# Patient Record
Sex: FEMALE | Race: WHITE | NOT HISPANIC OR LATINO | Employment: OTHER | URBAN - METROPOLITAN AREA
[De-identification: names, ages, dates, MRNs, and addresses within clinical notes are randomized per-mention and may not be internally consistent; named-entity substitution may affect disease eponyms.]

---

## 2023-07-28 LAB
ALANINE AMINOTRANSFERASE (SGPT) (U/L) IN SER/PLAS: 21 U/L (ref 7–45)
ALBUMIN (G/DL) IN SER/PLAS: 3.8 G/DL (ref 3.4–5)
ALKALINE PHOSPHATASE (U/L) IN SER/PLAS: 37 U/L (ref 33–136)
ANION GAP IN SER/PLAS: 9 MMOL/L (ref 10–20)
ASPARTATE AMINOTRANSFERASE (SGOT) (U/L) IN SER/PLAS: 24 U/L (ref 9–39)
BILIRUBIN TOTAL (MG/DL) IN SER/PLAS: 0.7 MG/DL (ref 0–1.2)
CALCIUM (MG/DL) IN SER/PLAS: 8.8 MG/DL (ref 8.6–10.3)
CARBON DIOXIDE, TOTAL (MMOL/L) IN SER/PLAS: 29 MMOL/L (ref 21–32)
CHLORIDE (MMOL/L) IN SER/PLAS: 104 MMOL/L (ref 98–107)
CREATININE (MG/DL) IN SER/PLAS: 0.63 MG/DL (ref 0.5–1.05)
GFR FEMALE: >90 ML/MIN/1.73M2
GLUCOSE (MG/DL) IN SER/PLAS: 85 MG/DL (ref 74–99)
POTASSIUM (MMOL/L) IN SER/PLAS: 4 MMOL/L (ref 3.5–5.3)
PROTEIN TOTAL: 6.5 G/DL (ref 6.4–8.2)
SODIUM (MMOL/L) IN SER/PLAS: 138 MMOL/L (ref 136–145)
UREA NITROGEN (MG/DL) IN SER/PLAS: 27 MG/DL (ref 6–23)

## 2023-11-05 PROBLEM — R10.2 FEMALE PELVIC PAIN: Status: ACTIVE | Noted: 2023-11-05

## 2023-11-05 PROBLEM — M70.62 GREATER TROCHANTERIC BURSITIS OF BOTH HIPS: Status: ACTIVE | Noted: 2023-11-05

## 2023-11-05 PROBLEM — M81.0 OSTEOPOROSIS: Status: ACTIVE | Noted: 2023-11-05

## 2023-11-05 PROBLEM — N81.4 CYSTOCELE WITH UTERINE DESCENSUS: Status: ACTIVE | Noted: 2023-11-05

## 2023-11-05 PROBLEM — N95.2 ATROPHIC VAGINITIS: Status: ACTIVE | Noted: 2023-11-05

## 2023-11-05 PROBLEM — M70.70: Status: ACTIVE | Noted: 2023-11-05

## 2023-11-05 PROBLEM — M25.551 PAIN OF BOTH HIP JOINTS: Status: ACTIVE | Noted: 2023-11-05

## 2023-11-05 PROBLEM — M70.61 GREATER TROCHANTERIC BURSITIS OF BOTH HIPS: Status: ACTIVE | Noted: 2023-11-05

## 2023-11-05 PROBLEM — M17.11 ARTHRITIS OF KNEE, RIGHT: Status: ACTIVE | Noted: 2023-11-05

## 2023-11-05 PROBLEM — B34.9 NONSPECIFIC SYNDROME SUGGESTIVE OF VIRAL ILLNESS: Status: ACTIVE | Noted: 2023-11-05

## 2023-11-05 PROBLEM — M25.511 RIGHT SHOULDER PAIN: Status: ACTIVE | Noted: 2023-11-05

## 2023-11-05 PROBLEM — N81.10 FEMALE BLADDER PROLAPSE: Status: ACTIVE | Noted: 2023-11-05

## 2023-11-05 PROBLEM — N89.8 VAGINAL DISCHARGE: Status: ACTIVE | Noted: 2023-11-05

## 2023-11-05 PROBLEM — M25.552 PAIN OF BOTH HIP JOINTS: Status: ACTIVE | Noted: 2023-11-05

## 2023-11-05 PROBLEM — M54.50 LOW BACK PAIN: Status: ACTIVE | Noted: 2023-11-05

## 2023-11-05 PROBLEM — N39.41 URGE INCONTINENCE: Status: ACTIVE | Noted: 2023-11-05

## 2023-11-05 PROBLEM — M25.561 RIGHT KNEE PAIN: Status: ACTIVE | Noted: 2023-11-05

## 2023-11-05 PROBLEM — R43.0 LOSS OF SENSE OF SMELL: Status: ACTIVE | Noted: 2019-09-10

## 2023-11-05 PROBLEM — J32.0 CHRONIC MAXILLARY SINUSITIS: Status: ACTIVE | Noted: 2019-09-10

## 2023-11-05 PROBLEM — N81.89 PELVIC FLOOR WEAKNESS: Status: ACTIVE | Noted: 2023-11-05

## 2023-11-05 PROBLEM — N39.3 FEMALE STRESS INCONTINENCE: Status: ACTIVE | Noted: 2023-11-05

## 2023-11-05 RX ORDER — MELOXICAM 15 MG/1
TABLET ORAL
COMMUNITY
End: 2024-03-11

## 2023-11-05 RX ORDER — ALBUTEROL SULFATE 90 UG/1
AEROSOL, METERED RESPIRATORY (INHALATION)
COMMUNITY
End: 2024-02-07

## 2023-11-05 RX ORDER — BIOTIN 10 MG
TABLET ORAL
COMMUNITY

## 2023-11-05 RX ORDER — CHOLECALCIFEROL (VITAMIN D3) 50 MCG
TABLET ORAL
COMMUNITY

## 2023-11-05 RX ORDER — MULTIVITAMIN
TABLET ORAL
COMMUNITY

## 2023-11-05 RX ORDER — PHENYLEPHRINE HCL 10 MG
TABLET ORAL
COMMUNITY

## 2023-11-05 RX ORDER — FLUTICASONE PROPIONATE 50 MCG
SPRAY, SUSPENSION (ML) NASAL
COMMUNITY
End: 2024-02-07

## 2023-11-05 RX ORDER — EPINEPHRINE 0.22MG
100 AEROSOL WITH ADAPTER (ML) INHALATION
COMMUNITY

## 2023-11-05 RX ORDER — ESTRADIOL 0.1 MG/G
CREAM VAGINAL 3 TIMES WEEKLY
COMMUNITY
Start: 2023-04-12

## 2023-11-05 RX ORDER — ACETAMINOPHEN, DEXTROMETHORPHAN HBR, DOXYLAMINE SUCCINATE, PHENYLEPHRINE HCL 650; 20; 12.5; 1 MG/30ML; MG/30ML; MG/30ML; MG/30ML
SOLUTION ORAL
COMMUNITY

## 2023-11-05 RX ORDER — AZITHROMYCIN 200 MG/5ML
POWDER, FOR SUSPENSION ORAL
COMMUNITY
End: 2024-02-07

## 2023-11-05 RX ORDER — CALCIUM CARBONATE 260MG(650)
TABLET,CHEWABLE ORAL
COMMUNITY
End: 2024-04-23 | Stop reason: WASHOUT

## 2023-11-05 RX ORDER — CYCLOSPORINE 0.5 MG/ML
EMULSION OPHTHALMIC
COMMUNITY
Start: 2016-09-12 | End: 2024-02-07

## 2023-11-05 RX ORDER — AZELASTINE 1 MG/ML
2 SPRAY, METERED NASAL NIGHTLY
COMMUNITY
End: 2024-02-07

## 2023-11-07 ENCOUNTER — OFFICE VISIT (OUTPATIENT)
Dept: UROLOGY | Facility: CLINIC | Age: 78
End: 2023-11-07
Payer: MEDICARE

## 2023-11-07 VITALS
BODY MASS INDEX: 21.05 KG/M2 | DIASTOLIC BLOOD PRESSURE: 63 MMHG | HEART RATE: 62 BPM | SYSTOLIC BLOOD PRESSURE: 131 MMHG | WEIGHT: 100.7 LBS

## 2023-11-07 DIAGNOSIS — N81.4 CYSTOCELE WITH UTERINE DESCENSUS: Primary | ICD-10-CM

## 2023-11-07 DIAGNOSIS — N39.41 URGE INCONTINENCE: ICD-10-CM

## 2023-11-07 DIAGNOSIS — N39.3 FEMALE STRESS INCONTINENCE: ICD-10-CM

## 2023-11-07 DIAGNOSIS — N81.89 PELVIC FLOOR WEAKNESS: ICD-10-CM

## 2023-11-07 PROCEDURE — 99213 OFFICE O/P EST LOW 20 MIN: CPT | Mod: PN | Performed by: OBSTETRICS & GYNECOLOGY

## 2023-11-07 PROCEDURE — 1126F AMNT PAIN NOTED NONE PRSNT: CPT | Performed by: OBSTETRICS & GYNECOLOGY

## 2023-11-07 PROCEDURE — 99213 OFFICE O/P EST LOW 20 MIN: CPT | Performed by: OBSTETRICS & GYNECOLOGY

## 2023-11-07 ASSESSMENT — ENCOUNTER SYMPTOMS
DEPRESSION: 0
OCCASIONAL FEELINGS OF UNSTEADINESS: 0
LOSS OF SENSATION IN FEET: 0

## 2023-11-07 NOTE — PROGRESS NOTES
Subjective   Patient ID: Manjula Ríos is a 78 y.o. female who presents to discuss her pelvic complaints.  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    The patient has noted episodic urinary urgency with her first void of the day.  She notes daytime frequency roughly every 3 hours.  She denies any stress urinary incontinence.  She denies any significant vaginal bulge complaints.  She is utilizing her vaginal estrogen therapy.  She denies any bowel related complaints.    She has no other complaints.    From Previous note  77-year-old with symptomatic stage II anterior and apical pelvic organ prolapse, mild vaginal atrophy, and pelvic floor weakness having been fitted with a #4 ring with support and knob 5/16/2023 with persistent vaginal versus urinary leakage concerns having had her pessary removed 8/1/2023 presenting for follow up.     The patient appears to be having excellent results without the pessary removed. She denies any abnormal vaginal discharge or bleeding. She only notes a vaginal bulge when doing jumping jacks or with a heavy cough. However this is easily reduced and this is not bothersome to her at this time.     She denies any worsening urinary urgency or frequency. She does have to rush to bathroom for her first void in the morning but that is not bothersome. She denies any episodes of nocturia or enuresis. She denies any UTI like symptoms.      She is continuing her vaginal estrogen therapy. She denies any vaginal discharge concerns at this time.     She denies any bowel related complaints, no fecal or flatal incontinence.     She has no other complaints.     From previous note   77-year-old with symptomatic stage II anterior and apical pelvic  "organ prolapse, mild vaginal atrophy, and pelvic floor weakness having been fitted with a #4 ring with support and knob 5/16/2023 with persistent vaginal versus urinary leakage concerns.     The patient reports increasing urinary urgency and frequency since the last visit. She states that she attempted to remove the pessary and noticed that she had increased leaking since then.      She denies any vaginal spotting. She states that she has been compliant with the topical estrogen and Trimo-Cornejo.      She denies constipation or any bowel-related symptoms.     She has no other complaints.      From previous note This visit was performed through telemedicine  77-year-old with symptomatic stage II anterior and apical pelvic organ prolapse, mild vaginal atrophy, and pelvic floor weakness having been fitted with a #4 ring with support and knob 5/16/2023 with persistent vaginal versus urinary leakage concerns.     Reports resolution of her malodorous urine s/p Trimo-Cornejo. Complains of persistent \"leakage\" that is vaginal vs urinary in nature, but she is not definitely sure, described as being more of a sensation. Last UDS was in June 2023. Reviewed UDS results again today, including being negative for leakage. Discussed option of removing her pessary at home vs in-office. She will schedule an appointment to have this removed.      She has no other complaints     From previous note  77-year-old with symptomatic stage II anterior and apical pelvic organ prolapse, mild vaginal atrophy, and pelvic floor weakness having been fitted with a #4 ring with support and knob 5/16/2023 with persistent urinary incontinence complaints.     The patient continues to note \"leakage\". She is wearing a pad and notes a \"yellowish\" leakage. She has proceeded with a phenazopyridine challenge and felt that this was bright orange in color and urine in nature. We discussed at length today her lower urinary tract symptoms. She denies any urgency or " frequency. She notes urinating roughly every 4-5 hours during the daytime and notes 1 episode of nocturia. She denies any stress urinary incontinence. She is overall very satisfied with her pessary noting complete resolution of her vaginal bulge complaints. She is utilizing her vaginal estrogen therapy.     We discussed at length today my concerns that her discharge is vaginal in nature as opposed to urine.     She has no other complaints.     From previous note  This visit was performed through telemedicine  77-year-old with symptomatic stage II anterior and apical pelvic organ prolapse, mild vaginal atrophy, and pelvic floor weakness having been fitted with a #4 ring with support and knob 5/16/2023.     The patient was utilizing the Oxytrol patch but noted severe fatigue and pain in her lower extremities. She states her symptoms resolved since she stopped utilizing it. It was noted that she has yellow urine on her pad with her phenazopyridine challenge. She denies any urgency but continues to leak on movement and laughing, coughing and sneezing. Bladder sling Surgery was discussed at length.      She denies any vaginal complaints, no abnormal vaginal bleeding or discharge.     She denies any bowel related complaints, no fecal or flatal incontinence.     She has no other complaints.     From previous note  This visit was performed through telemedicine  77-year-old with symptomatic stage II anterior and apical pelvic organ prolapse, mild vaginal atrophy, and pelvic floor weakness having been fitted with a #4 ring with support and knob 5/16/2023.     The patient presents to discuss her UDS test results, which demonstrates no filling defect, no bladder spasm, mild sensitivity, no obstruction and adequate emptying. The test does not demonstrate any signs of stress incontinence. she voids 4-5 times during the day but leaks in between and utilizes pads throughout the day, as she leaks with movement and at the sound of  water. She denies any episodes of nocturia , however her pad is moist when she wakes up. She denies any UTI like symptoms.      She denies any vaginal complaints, no abnormal vaginal bleeding or discharge.     She denies any bowel related complaints, no fecal or flatal incontinence.     She has no other complaints.     From previous note  77-year-old with symptomatic stage II anterior and apical pelvic organ prolapse, mild vaginal atrophy, and pelvic floor weakness having been fitted with a #4 ring with support and knob 5/16/2023.     The patient reports of noting moderate incontinence after the pessary placed followed by a gush on sneezing. She voids 3-4 times during the day but leaks in between and utilizes pads throughout the day, as she leaks with movement. She denies any episodes of nocturia , however her pad is moist when she wakes up. She denies any UTI like symptoms.      She does note near resolution of her bulge complaints with the pessary in place, however she is bothered by her lower urinary tract complaints. She denies any abnormal vaginal bleeding or discharge. She is utilizing the vaginal estrogen cream.     She denies any bowel related complaints, no fecal or flatal incontinence.     She has no other complaints.     From previous note  77-year-old with symptomatic stage II anterior and apical pelvic organ prolapse, mild vaginal atrophy, and pelvic floor weakness having been fitted with a #4 ring with support 4/25/2023.     The patient is satisfied from the pessary standpoint. She denies any vaginal complaints, no abnormal vaginal bleeding or discharge. She is utilizing the vaginal estrogen cream.     She complaints on leaking when she stands from sitting position. She does note leaking on laughing, cough or sneezing. She is noting 2-3 episodes of nocturia with urgency.     She denies any bowel related complaints, she did feel the pessary was pushing on her bowels but no fecal or flatal  incontinence.     She has no other complaints.        From previous note  77-year-old with symptomatic stage II anterior and apical pelvic organ prolapse, mild vaginal atrophy, and pelvic floor weakness presenting for pessary fitting today 04/25/23.     She is utilizing the vaginal estrogen cream.      She has no other complaints.           From previous note  77- year-old patient presenting as a referral from Dr. Isbell with complaints of pelvic organ prolapse.     The patient states she noticed a vaginal bulge last night in the shower, she denies any pressure or pulling sensation. She also denies any vaginal bleeding or discharge. She is sexually active and denies any vaginal complaints. She was previously seen by Dr. Isbell and Dr. Sepulveda for he gynecological care.     She also note urinary urgency and incontinence. She denies leaking on laughing, cough or sneezing. She denies any nocturia but denies any enuresis. She voids every hour during the day. She denies any history of nephrolithiasis, gross hematuria or chronic recurrent UTIs.      She complaints of constipation but has a bowel movement every morning, she has utilized MiraLAX in the past. She denies any fecal or flatal incontinence.      She has no other complaints.  Review of Systems    Objective   Physical Exam  Constitutional: No fever, No chills and No fatigue.   Eyes: No vision problems and No dryness of the eyes.   ENT: No dry mouth, No hearing loss and No nosebleeds.   Cardiovascular: No chest pain, No palpitations and No orthopnea.   Respiratory: No shortness of breath, No cough and No wheezing.   Gastrointestinal: No abdominal pain, No constipation, No nausea, No diarrhea, No vomiting and No melena.   Genitourinary: As noted in HPI.   Musculoskeletal: No back pain, No myalgias, No muscle weakness, No joint swelling and No leg edema.   Integumentary: No rashes, No skin lesion and No itching.   Neurological: No headache, No numbness and No  dizziness.   Psychiatric: No sleep disturbances, No anxiety and No depression.   Endocrine: No hot flashes, No loss of hair and No hirsutism.   Hematologic/Lymphatic: No swollen glands, No tendency for easy bleeding and No tendency for easy bruising.   All other systems have been reviewed and are negative for complaint.       PHYSICAL EXAMINATION:  No LMP recorded.  Body mass index is 21.05 kg/m².  /63   Pulse 62   Wt 45.7 kg (100 lb 11.2 oz)   BMI 21.05 kg/m²   General Appearance: well appearing  Neuro: Alert and oriented   HEENT: mucous membranes moist, neck supple  Resp: No respiratory distress, normal work of breathing  MSK: normal range of motion, gait appropriate         Assessment/Plan      78-year-old with symptomatic stage II anterior and apical pelvic organ prolapse, mild vaginal atrophy, and pelvic floor weakness having been fitted with a #4 ring with support and knob 5/16/2023 with vaginal versus urinary leakage concerns having had her pessary removed 8/1/2023.     #1 prior to the patient's pessary placement, she denied any stress urinary incontinence complaints. The patient was successfully fitted with a #4 ring with support 4/25/2023 as a #3 ring with support was too small. However she has noted persistent lower urinary tract leakage concerns consistent with stress urinary incontinence. However with the prolapse reduction, 6/2/2023 urodynamics demonstrated no stress urinary incontinence, mildly elevated EMG use, and appropriate bladder emptying with mild irritability. We again discussed that her prolapse reduction with the pessary in place is likely causing occult stress incontinence complaints given the fact that she has no significant urgency complaints. We discussed that correcting any kind of bulge from a surgical standpoint may have no significant effect on her lower urinary tract symptoms. We similarly discussed that correcting her lower urinary tract symptoms would have no effect on  "bulge symptoms. Closer discussion, it appears that her \"leakage\" concerns may actually be vaginal discharge in nature. GEN path 7/12/2023 was completely negative. Trimo-Cornejo appears to have given the patient some benefit but she continues to note \"leakage\". The patient's pessary was removed 8/1/2023. She has noted resolution of her vaginal complaints. She does note episodic bulge concerns but this is not bothersome to her at this time. We did discuss at length today her surgical options including a vaginal hysterectomy with uterosacral ligament suspension, supracervical hysterectomy with sacrocolpopexy, or possible hysteropexy. She will be most interested in a sacrocolpopexy in the future should she find this necessary.     2. We discussed the patient's vaginal atrophy. She will continue her vaginal estrogen therapy 3 times a week moving forward.      3. We discussed the patient's mild pelvic floor weakness.  We also discussed her episodic urinary urgency and frequency.  We discussed the [facility] care pathway including fluid management strategies, pelvic floor physical therapy, and the possibility of beta 3 agonist therapy.  She is otherwise asymptomatic from this.  She was provided a pelvic floor physical therapy referral as well as a list of providers.     4. The patient will follow-up on an as-needed basis moving forward.     LAUREN Montoya MD     Scribe Attestation  By signing my name below, I, Deejay Peña attest that this documentation has been prepared under the direction and in the presence of Ignacio Montoya MD. All medical record entries made by the Scribe were at my direction or personally dictated by me. I have reviewed the chart and agree that the record accurately reflects my personal performance of the history, physical exam, discussion and plan.    "

## 2023-11-07 NOTE — PATIENT INSTRUCTIONS
You are provided a referral for pelvic floor physical therapy as well as list of providers given your episodic urgency complaints.    We also discussed surgical correction of your prolapse.  We discussed proceeding with a hysterectomy through the vagina with a uterosacral ligament suspension or performing a laparoscopic supracervical hysterectomy with sacrocolpopexy.    Please continue your vaginal estrogen therapy.    Please contact the clinic with any questions or concerns.    [phone].

## 2023-11-09 ENCOUNTER — TREATMENT (OUTPATIENT)
Dept: PHYSICAL THERAPY | Facility: CLINIC | Age: 78
End: 2023-11-09
Payer: MEDICARE

## 2023-11-09 DIAGNOSIS — N81.89 PELVIC FLOOR WEAKNESS: Primary | ICD-10-CM

## 2023-11-09 DIAGNOSIS — N39.3 FEMALE STRESS INCONTINENCE: ICD-10-CM

## 2023-11-09 DIAGNOSIS — N81.4 CYSTOCELE WITH UTERINE DESCENSUS: ICD-10-CM

## 2023-11-09 DIAGNOSIS — N39.41 URGE INCONTINENCE: ICD-10-CM

## 2023-11-09 PROCEDURE — 97535 SELF CARE MNGMENT TRAINING: CPT | Mod: GP

## 2023-11-09 PROCEDURE — 97161 PT EVAL LOW COMPLEX 20 MIN: CPT | Mod: GP,KX

## 2023-11-09 ASSESSMENT — PAIN SCALES - GENERAL: PAINLEVEL_OUTOF10: 0 - NO PAIN

## 2023-11-09 ASSESSMENT — ENCOUNTER SYMPTOMS
DEPRESSION: 0
OCCASIONAL FEELINGS OF UNSTEADINESS: 0
LOSS OF SENSATION IN FEET: 0

## 2023-11-09 ASSESSMENT — PAIN - FUNCTIONAL ASSESSMENT: PAIN_FUNCTIONAL_ASSESSMENT: 0-10

## 2023-11-09 NOTE — PROGRESS NOTES
Physical Therapy    PELVIC FLOOR    Name: Manjula Ríos  MRN: [id number]  : 1945  Today's Date: 23     Time Calculation  Start Time: 1303  Stop Time: 1350  Time Calculation (min): 47 min    Assessment:     Pt presenting to the clinic with main complaint of urinary urgency. She is a very active 78 year old female who also has some complaints of prolapse s/p pessary removal but this is not too problematic for her. Anticipate her primary complaint is from abnormal bladder habits. Pt has above average pelvic stability and strength for age. A vaginal exam to assess pressure system and prolapse was deferred until next visit - pt aware. Will see for a short course of physical therapy.     Plan:     PT Plan: Skilled PT  PT Frequency: One time visit  Certification Period Start Date: 23  Certification Period End Date: 24  Rehab Potential: Excellent  Plan of Care Agreement: Patient    Interventions:   Planned interventions include: biofeedback, cryotherapy, education/instruction, electrical stimulation, gait training, home program, hot pack, kinesiotaping, manual therapy, neuromuscular re-education, self care/home management, therapeutic activities, and therapeutic exercises.       Current Problem:  1. Pelvic floor weakness  Referral to Physical Therapy    Follow Up In Physical Therapy      2. Cystocele with uterine descensus  Referral to Physical Therapy      3. Female stress incontinence  Referral to Physical Therapy      4. Urge incontinence  Referral to Physical Therapy          Subjective   General  Reason for Referral: Urinary urgency  Referred By: Dr. Montoya  Pt with history of POP and had a pessary placement for a month. She had it removed because she was having more discharge. Her symptoms were much better. Her biggest complaint is urinary urgency first thing in the morning. Sometimes will have a little bit of urinary leakage on the way.   She has urinary urgency with returning home.   She  is not endorsing any stress incontinence.   The prolapse is not bothersome for her.   There is no bladder pain.   Daytime urinary frequency: 4  Night time frequency: 0   Panty liner in the morning   Trying to do a little bit of kegels --> improving     Ob Gyn:  4 vaginal deliveries, with 1 D+ C  No episiotomies, 1 forcep and + tearing   No gynecological surgeries   Menopause age 52   Currently, taking estrogen      Precautions  Precautions Comment: none     Pain  Pain Assessment: 0-10  Pain Score: 0 - No pain    Objective     AROM:  Spinal flexion: WFL  Spinal extension: WFL  Spinal rotation: R/L within expected values for age  Spinal SB: WFL    PROM/Joint Mobility:  Hip PROM flexion: WFL but with capsular restriction  Hip PROM external and internal rotation: limited ER > IR bilaterally   R/L significant decrease in HS length    Strength:  Hip extension with knee extended 4/5 R/L  Hip extension with knee flexion 4-/5 R/L  Hip abduction 4/5 R/L    Palpation:  No visceral trigger points or reproduction of symptoms     Observation:   Increased lumbar scoliosis with right rotation   Pelvis with CW rotation, potential L ASIS upslip     SL stance:  Able to perform without stepping strategy or pelvic drop  B squat to 90 without subjective vaginal pressure or pelvic compensation  R/L lunge without subjective vaginal pressure       OUTCOMES MEASURE:  NIH CPSI pain: 0  NIH CPSI urinary: 1  NIH CPSI quality of life: 0       Education:   Discussed urge suppression techniques, avoid environmental triggers, eliminate just in case urination.     Careplan Goals:    Pt will be independent in HEP   Pt will be able to verbalize positions and exercises that will increase prolapse symptoms  Pt will be able to have a 0 on NIH CPSI   Pt will be able to reduce urinary urgency 100% of the time when pulling into the garage       Yajaira Dorsey, PT

## 2023-11-28 ENCOUNTER — TREATMENT (OUTPATIENT)
Dept: PHYSICAL THERAPY | Facility: CLINIC | Age: 78
End: 2023-11-28
Payer: MEDICARE

## 2023-11-28 DIAGNOSIS — N81.89 PELVIC FLOOR WEAKNESS: ICD-10-CM

## 2023-11-28 PROCEDURE — 97535 SELF CARE MNGMENT TRAINING: CPT | Mod: GP

## 2023-11-28 PROCEDURE — 97140 MANUAL THERAPY 1/> REGIONS: CPT | Mod: GP

## 2023-11-28 NOTE — PROGRESS NOTES
Physical Therapy - Discharge     PELVIC FLOOR    Name: Manjula Ríos  MRN: [id number]  : 1945  Today's Date: 23     Time Calculation  Start Time: 1107  Stop Time: 1145  Time Calculation (min): 38 min    Assessment:     Pt with minimal functional impairment except for bulge and interested in surgical management. Midline descent noted. Pt with appropriate pelvic floor strength and mild increase in glute compensation during contraction.     Plan:    All patient's questions were answered and will discharge and follow up if needed.       Interventions:   Vaginal exam with consent to assess laxity of vaginal wall    Current Problem:  1. Pelvic floor weakness  Follow Up In Physical Therapy          Subjective   General   Will be seeing Dr. Sepulveda in January for management of symptoms. Interested in bladder sling. Compliant with estrogen. Feeling prolapse more now.     Precautions   none       Pain   0/10    Objective     EXTERNAL OBSERVATION:  Voluntary Contraction: +  Voluntary Relaxation: +  Involuntary Contraction: +  Involuntary Relaxation +        INTERNAL VAGINAL EXAMINATION:  PFM Strength: 4/5  Coordination: 3 in 10 seconds     OUTCOMES MEASURE:    Careplan Goals:  Pt will be independent in HEP --> MET   Pt will be able to verbalize positions and exercises that will increase prolapse symptoms --> MET  Pt will be able to have a 0 on NIH CPSI --> NOT MET   Pt will be able to reduce urinary urgency 100% of the time when pulling into the garage --> NOT MET, PROGRESSING     Yajaira Dorsey, PT

## 2023-12-15 ENCOUNTER — APPOINTMENT (OUTPATIENT)
Dept: OBSTETRICS AND GYNECOLOGY | Facility: CLINIC | Age: 78
End: 2023-12-15
Payer: MEDICARE

## 2023-12-29 ENCOUNTER — OFFICE VISIT (OUTPATIENT)
Dept: OBSTETRICS AND GYNECOLOGY | Facility: CLINIC | Age: 78
End: 2023-12-29
Payer: MEDICARE

## 2023-12-29 VITALS — WEIGHT: 100 LBS | BODY MASS INDEX: 20.99 KG/M2 | HEIGHT: 58 IN

## 2023-12-29 DIAGNOSIS — N81.10 FEMALE BLADDER PROLAPSE: Primary | ICD-10-CM

## 2023-12-29 PROCEDURE — 1126F AMNT PAIN NOTED NONE PRSNT: CPT | Performed by: OBSTETRICS & GYNECOLOGY

## 2023-12-29 PROCEDURE — 99213 OFFICE O/P EST LOW 20 MIN: CPT | Performed by: OBSTETRICS & GYNECOLOGY

## 2023-12-29 ASSESSMENT — ENCOUNTER SYMPTOMS
CONSTITUTIONAL NEGATIVE: 0
NEUROLOGICAL NEGATIVE: 0
CARDIOVASCULAR NEGATIVE: 0
HEMATOLOGIC/LYMPHATIC NEGATIVE: 0
ALLERGIC/IMMUNOLOGIC NEGATIVE: 0
MUSCULOSKELETAL NEGATIVE: 0
GASTROINTESTINAL NEGATIVE: 0
PSYCHIATRIC NEGATIVE: 0
RESPIRATORY NEGATIVE: 0
EYES NEGATIVE: 0
ENDOCRINE NEGATIVE: 0

## 2023-12-29 ASSESSMENT — PAIN SCALES - GENERAL: PAINLEVEL: 0-NO PAIN

## 2023-12-29 NOTE — PROGRESS NOTES
Subjective   Patient ID: Manjula Ríos is a 78 y.o. female who presents for Vaginal Prolapse (Patient here for a prolapse uterus last pap 10/31/14 wnl last mammogram 12/21/22 benign).  HPI  Female is here for exam regarding vaginal prolapse.  She been seen by urogynecology.  They have tried pessaries.  Still with bothersome pressure and prolapse.  Rare leakage some urgency in the morning.  Very active and exercises almost daily.  Bowel symptoms.  Dysuria currently  Review of Systems   Genitourinary:  Positive for vaginal pain.   All other systems reviewed and are negative.      Objective   Physical Exam  External genitalia [place]'s urethra and [place]'s normal.  Vaginal vault without blood or discharge.  Cervix without lesions.  Good support of the posterior floor.  Significant third-degree cystocele.  Mild amount of uterine descensus.  Uterus is small mobile nontender no adnexal masses.  No pelvic floor pain elicited.      Assessment/Plan   Patient with normal exam other than cystocele pressure.  Very active and exercises regularly.  Discussed options.  She is already tried a pessary.  She has done physical therapy.  Not with significant improvement.  I had some discussion with her regarding sacrospinous suspension versus Sacrocolpopexy    Encouraged to follow-up with urogynecology for further counseling.       Earl Sepulveda MD 12/29/23 5:34 PM

## 2024-01-02 ENCOUNTER — OFFICE VISIT (OUTPATIENT)
Dept: UROLOGY | Facility: CLINIC | Age: 79
End: 2024-01-02
Payer: MEDICARE

## 2024-01-02 VITALS — SYSTOLIC BLOOD PRESSURE: 110 MMHG | DIASTOLIC BLOOD PRESSURE: 64 MMHG | HEART RATE: 63 BPM

## 2024-01-02 DIAGNOSIS — N39.3 FEMALE STRESS INCONTINENCE: ICD-10-CM

## 2024-01-02 DIAGNOSIS — N81.4 CYSTOCELE WITH UTERINE DESCENSUS: Primary | ICD-10-CM

## 2024-01-02 DIAGNOSIS — N39.41 URGE INCONTINENCE: ICD-10-CM

## 2024-01-02 DIAGNOSIS — N81.89 PELVIC FLOOR WEAKNESS: ICD-10-CM

## 2024-01-02 PROCEDURE — 99214 OFFICE O/P EST MOD 30 MIN: CPT | Performed by: OBSTETRICS & GYNECOLOGY

## 2024-01-02 PROCEDURE — 1126F AMNT PAIN NOTED NONE PRSNT: CPT | Performed by: OBSTETRICS & GYNECOLOGY

## 2024-01-02 NOTE — PROGRESS NOTES
Subjective   Patient ID: Manjula Ríos is a 78 y.o. female who presents to discuss her pelvic complaints.  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    The patient presents to discuss options for her pelvic organ prolapse. Surgical correction was discussed at length.    She continues to be bothered by her bulge.     She is continuing her vaginal estrogen therapy. She denies any vaginal discharge concerns at this time.     She denies any bowel related complaints, no fecal or flatal incontinence.     She has no other complaints.    From Previous note  78-year-old with symptomatic stage II anterior and apical pelvic organ prolapse, mild vaginal atrophy, and pelvic floor weakness having been fitted with a #4 ring with support and knob 5/16/2023 with persistent vaginal versus urinary leakage concerns having had her pessary removed 8/1/2023.    The patient has noted episodic urinary urgency with her first void of the day.  She notes daytime frequency roughly every 3 hours.  She denies any stress urinary incontinence.  She denies any significant vaginal bulge complaints.  She is utilizing her vaginal estrogen therapy.  She denies any bowel related complaints.    She has no other complaints.    From Previous note  77-year-old with symptomatic stage II anterior and apical pelvic organ prolapse, mild vaginal atrophy, and pelvic floor weakness having been fitted with a #4 ring with support and knob 5/16/2023 with persistent vaginal versus urinary leakage concerns having had her pessary removed 8/1/2023 presenting for follow up.     The patient appears to be having excellent results without the pessary removed. She denies any abnormal vaginal discharge or bleeding. She only notes a  "vaginal bulge when doing jumping jacks or with a heavy cough. However this is easily reduced and this is not bothersome to her at this time.     She denies any worsening urinary urgency or frequency. She does have to rush to bathroom for her first void in the morning but that is not bothersome. She denies any episodes of nocturia or enuresis. She denies any UTI like symptoms.      She is continuing her vaginal estrogen therapy. She denies any vaginal discharge concerns at this time.     She denies any bowel related complaints, no fecal or flatal incontinence.     She has no other complaints.     From previous note   77-year-old with symptomatic stage II anterior and apical pelvic organ prolapse, mild vaginal atrophy, and pelvic floor weakness having been fitted with a #4 ring with support and knob 5/16/2023 with persistent vaginal versus urinary leakage concerns.     The patient reports increasing urinary urgency and frequency since the last visit. She states that she attempted to remove the pessary and noticed that she had increased leaking since then.      She denies any vaginal spotting. She states that she has been compliant with the topical estrogen and Trimo-Cornejo.      She denies constipation or any bowel-related symptoms.     She has no other complaints.      From previous note This visit was performed through telemedicine  77-year-old with symptomatic stage II anterior and apical pelvic organ prolapse, mild vaginal atrophy, and pelvic floor weakness having been fitted with a #4 ring with support and knob 5/16/2023 with persistent vaginal versus urinary leakage concerns.     Reports resolution of her malodorous urine s/p Trimo-Cornejo. Complains of persistent \"leakage\" that is vaginal vs urinary in nature, but she is not definitely sure, described as being more of a sensation. Last UDS was in June 2023. Reviewed UDS results again today, including being negative for leakage. Discussed option of removing her pessary " "at home vs in-office. She will schedule an appointment to have this removed.      She has no other complaints     From previous note  77-year-old with symptomatic stage II anterior and apical pelvic organ prolapse, mild vaginal atrophy, and pelvic floor weakness having been fitted with a #4 ring with support and knob 5/16/2023 with persistent urinary incontinence complaints.     The patient continues to note \"leakage\". She is wearing a pad and notes a \"yellowish\" leakage. She has proceeded with a phenazopyridine challenge and felt that this was bright orange in color and urine in nature. We discussed at length today her lower urinary tract symptoms. She denies any urgency or frequency. She notes urinating roughly every 4-5 hours during the daytime and notes 1 episode of nocturia. She denies any stress urinary incontinence. She is overall very satisfied with her pessary noting complete resolution of her vaginal bulge complaints. She is utilizing her vaginal estrogen therapy.     We discussed at length today my concerns that her discharge is vaginal in nature as opposed to urine.     She has no other complaints.     From previous note  This visit was performed through telemedicine  77-year-old with symptomatic stage II anterior and apical pelvic organ prolapse, mild vaginal atrophy, and pelvic floor weakness having been fitted with a #4 ring with support and knob 5/16/2023.     The patient was utilizing the Oxytrol patch but noted severe fatigue and pain in her lower extremities. She states her symptoms resolved since she stopped utilizing it. It was noted that she has yellow urine on her pad with her phenazopyridine challenge. She denies any urgency but continues to leak on movement and laughing, coughing and sneezing. Bladder sling Surgery was discussed at length.      She denies any vaginal complaints, no abnormal vaginal bleeding or discharge.     She denies any bowel related complaints, no fecal or flatal " incontinence.     She has no other complaints.     From previous note  This visit was performed through telemedicine  77-year-old with symptomatic stage II anterior and apical pelvic organ prolapse, mild vaginal atrophy, and pelvic floor weakness having been fitted with a #4 ring with support and knob 5/16/2023.     The patient presents to discuss her UDS test results, which demonstrates no filling defect, no bladder spasm, mild sensitivity, no obstruction and adequate emptying. The test does not demonstrate any signs of stress incontinence. she voids 4-5 times during the day but leaks in between and utilizes pads throughout the day, as she leaks with movement and at the sound of water. She denies any episodes of nocturia , however her pad is moist when she wakes up. She denies any UTI like symptoms.      She denies any vaginal complaints, no abnormal vaginal bleeding or discharge.     She denies any bowel related complaints, no fecal or flatal incontinence.     She has no other complaints.     From previous note  77-year-old with symptomatic stage II anterior and apical pelvic organ prolapse, mild vaginal atrophy, and pelvic floor weakness having been fitted with a #4 ring with support and knob 5/16/2023.     The patient reports of noting moderate incontinence after the pessary placed followed by a gush on sneezing. She voids 3-4 times during the day but leaks in between and utilizes pads throughout the day, as she leaks with movement. She denies any episodes of nocturia , however her pad is moist when she wakes up. She denies any UTI like symptoms.      She does note near resolution of her bulge complaints with the pessary in place, however she is bothered by her lower urinary tract complaints. She denies any abnormal vaginal bleeding or discharge. She is utilizing the vaginal estrogen cream.     She denies any bowel related complaints, no fecal or flatal incontinence.     She has no other complaints.     From  previous note  77-year-old with symptomatic stage II anterior and apical pelvic organ prolapse, mild vaginal atrophy, and pelvic floor weakness having been fitted with a #4 ring with support 4/25/2023.     The patient is satisfied from the pessary standpoint. She denies any vaginal complaints, no abnormal vaginal bleeding or discharge. She is utilizing the vaginal estrogen cream.     She complaints on leaking when she stands from sitting position. She does note leaking on laughing, cough or sneezing. She is noting 2-3 episodes of nocturia with urgency.     She denies any bowel related complaints, she did feel the pessary was pushing on her bowels but no fecal or flatal incontinence.     She has no other complaints.        From previous note  77-year-old with symptomatic stage II anterior and apical pelvic organ prolapse, mild vaginal atrophy, and pelvic floor weakness presenting for pessary fitting today 04/25/23.     She is utilizing the vaginal estrogen cream.      She has no other complaints.           From previous note  77- year-old patient presenting as a referral from Dr. Isbell with complaints of pelvic organ prolapse.     The patient states she noticed a vaginal bulge last night in the shower, she denies any pressure or pulling sensation. She also denies any vaginal bleeding or discharge. She is sexually active and denies any vaginal complaints. She was previously seen by Dr. Isbell and Dr. Sepulveda for he gynecological care.     She also note urinary urgency and incontinence. She denies leaking on laughing, cough or sneezing. She denies any nocturia but denies any enuresis. She voids every hour during the day. She denies any history of nephrolithiasis, gross hematuria or chronic recurrent UTIs.      She complaints of constipation but has a bowel movement every morning, she has utilized MiraLAX in the past. She denies any fecal or flatal incontinence.      She has no other complaints.  Review of  Systems    Objective   Physical Exam  Constitutional: No fever, No chills and No fatigue.   Eyes: No vision problems and No dryness of the eyes.   ENT: No dry mouth, No hearing loss and No nosebleeds.   Cardiovascular: No chest pain, No palpitations and No orthopnea.   Respiratory: No shortness of breath, No cough and No wheezing.   Gastrointestinal: No abdominal pain, No constipation, No nausea, No diarrhea, No vomiting and No melena.   Genitourinary: As noted in HPI.   Musculoskeletal: No back pain, No myalgias, No muscle weakness, No joint swelling and No leg edema.   Integumentary: No rashes, No skin lesion and No itching.   Neurological: No headache, No numbness and No dizziness.   Psychiatric: No sleep disturbances, No anxiety and No depression.   Endocrine: No hot flashes, No loss of hair and No hirsutism.   Hematologic/Lymphatic: No swollen glands, No tendency for easy bleeding and No tendency for easy bruising.   All other systems have been reviewed and are negative for complaint.       PHYSICAL EXAMINATION:  No LMP recorded (lmp unknown).  There is no height or weight on file to calculate BMI.  LMP  (LMP Unknown)   General Appearance: well appearing  Neuro: Alert and oriented   HEENT: mucous membranes moist, neck supple  Resp: No respiratory distress, normal work of breathing  MSK: normal range of motion, gait appropriate         Assessment/Plan      78-year-old with symptomatic stage II anterior and apical pelvic organ prolapse, mild vaginal atrophy, and pelvic floor weakness having been fitted with a #4 ring with support and knob 5/16/2023 with vaginal versus urinary leakage concerns having had her pessary removed 8/1/2023.     #1 prior to the patient's pessary placement, she denied any stress urinary incontinence complaints. The patient was successfully fitted with a #4 ring with support 4/25/2023 as a #3 ring with support was too small. However she has noted persistent lower urinary tract leakage  "concerns consistent with stress urinary incontinence. However with the prolapse reduction, 6/2/2023 urodynamics demonstrated no stress urinary incontinence, mildly elevated EMG use, and appropriate bladder emptying with mild irritability. We again discussed that her prolapse reduction with the pessary in place is likely causing occult stress incontinence complaints given the fact that she has no significant urgency complaints. We discussed that correcting any kind of bulge from a surgical standpoint may have no significant effect on her lower urinary tract symptoms. We similarly discussed that correcting her lower urinary tract symptoms would have no effect on bulge symptoms. Closer discussion, it appears that her \"leakage\" concerns may actually be vaginal discharge in nature. GEN path 7/12/2023 was completely negative. Trimo-Cornejo appears to have given the patient some benefit but she continues to note \"leakage\". The patient's pessary was removed 8/1/2023.  She is significantly bothered by her bulge complaints and wishes to proceed with definitive surgical options.  We therefore discussed at length today the options of a sacrocolpopexy, uterosacral ligament suspension as well as hysteropexy.  We discussed the risks of bleeding, infection, damage surrounding tissues, mesh exposure, postoperative restrictions, and failure.  The patient is most interested in proceeding with either a uterosacral ligament suspension or hysteropexy.  She wishes to consider these options before making a decision.      2. We have previously discussed the patient's vaginal atrophy. She will continue her vaginal estrogen therapy 3 times a week moving forward.      3. We discussed the patient's mild pelvic floor weakness.  We also discussed her episodic urinary urgency and frequency.  We discussed the [facility] care pathway including fluid management strategies, pelvic floor physical therapy, and the possibility of beta 3 agonist therapy.  She is " otherwise asymptomatic from this.  She has not had the opportunity to follow-up with pelvic floor physical therapy.     4. The patient will be contacted tomorrow to discuss how she would like to proceed with surgical correction of her prolapse concerns.     LAUREN Montoya MD     Scribe Attestation  By signing my name below, I, Deejay Peña attest that this documentation has been prepared under the direction and in the presence of Ignacio Montoya MD. All medical record entries made by the Scribe were at my direction or personally dictated by me. I have reviewed the chart and agree that the record accurately reflects my personal performance of the history, physical exam, discussion and plan.

## 2024-01-03 ENCOUNTER — PREP FOR PROCEDURE (OUTPATIENT)
Dept: OPERATING ROOM | Facility: HOSPITAL | Age: 79
End: 2024-01-03
Payer: MEDICARE

## 2024-01-03 ENCOUNTER — TELEPHONE (OUTPATIENT)
Dept: UROLOGY | Facility: CLINIC | Age: 79
End: 2024-01-03

## 2024-01-03 DIAGNOSIS — N81.4 UTEROVAGINAL PROLAPSE: Primary | ICD-10-CM

## 2024-01-03 RX ORDER — PHENAZOPYRIDINE HYDROCHLORIDE 100 MG/1
200 TABLET, FILM COATED ORAL ONCE
Status: CANCELLED | OUTPATIENT
Start: 2024-01-03 | End: 2024-01-03

## 2024-01-03 RX ORDER — CEFAZOLIN SODIUM 2 G/100ML
2 INJECTION, SOLUTION INTRAVENOUS ONCE
Status: CANCELLED | OUTPATIENT
Start: 2024-01-03 | End: 2024-01-03

## 2024-01-08 ENCOUNTER — TELEPHONE (OUTPATIENT)
Dept: ENDOCRINOLOGY | Facility: CLINIC | Age: 79
End: 2024-01-08
Payer: MEDICARE

## 2024-01-08 ENCOUNTER — TELEPHONE (OUTPATIENT)
Dept: PRIMARY CARE | Facility: CLINIC | Age: 79
End: 2024-01-08

## 2024-01-08 NOTE — TELEPHONE ENCOUNTER
Pt is scheduled to have her prolia injection on 02/26/24 at [facility] center but is scheduled to have a hysterectomy on 02/15/24. Pt would like to know if she should get prolia earlier or delay a bit until after heals from hysterectomy?

## 2024-01-08 NOTE — TELEPHONE ENCOUNTER
Pt due for her prolia on the 26th but she is having a hysterectomy on 15th wants to know if the prolia s/b delayed or made sooner

## 2024-01-22 ENCOUNTER — APPOINTMENT (OUTPATIENT)
Dept: OBSTETRICS AND GYNECOLOGY | Facility: CLINIC | Age: 79
End: 2024-01-22
Payer: MEDICARE

## 2024-02-02 DIAGNOSIS — N81.89 PELVIC FLOOR WEAKNESS: ICD-10-CM

## 2024-02-02 DIAGNOSIS — N39.3 FEMALE STRESS INCONTINENCE: ICD-10-CM

## 2024-02-02 DIAGNOSIS — N81.4 CYSTOCELE WITH UTERINE DESCENSUS: Primary | ICD-10-CM

## 2024-02-07 ENCOUNTER — TELEMEDICINE CLINICAL SUPPORT (OUTPATIENT)
Dept: PREADMISSION TESTING | Facility: HOSPITAL | Age: 79
End: 2024-02-07
Payer: MEDICARE

## 2024-02-07 DIAGNOSIS — N39.3 FEMALE STRESS INCONTINENCE: ICD-10-CM

## 2024-02-07 DIAGNOSIS — N81.89 PELVIC FLOOR WEAKNESS: ICD-10-CM

## 2024-02-07 DIAGNOSIS — N81.4 CYSTOCELE WITH UTERINE DESCENSUS: ICD-10-CM

## 2024-02-07 RX ORDER — ZINC GLUCONATE 50 MG
50 TABLET ORAL DAILY
COMMUNITY

## 2024-02-07 RX ORDER — ASCORBIC ACID 500 MG
500 TABLET ORAL DAILY
COMMUNITY

## 2024-02-09 ENCOUNTER — PRE-ADMISSION TESTING (OUTPATIENT)
Dept: PREADMISSION TESTING | Facility: HOSPITAL | Age: 79
End: 2024-02-09
Payer: MEDICARE

## 2024-02-09 ENCOUNTER — LAB (OUTPATIENT)
Dept: LAB | Facility: LAB | Age: 79
End: 2024-02-09
Payer: MEDICARE

## 2024-02-09 ENCOUNTER — TELEPHONE (OUTPATIENT)
Dept: UROLOGY | Facility: CLINIC | Age: 79
End: 2024-02-09

## 2024-02-09 VITALS
HEART RATE: 55 BPM | RESPIRATION RATE: 18 BRPM | SYSTOLIC BLOOD PRESSURE: 105 MMHG | TEMPERATURE: 95.9 F | BODY MASS INDEX: 19.11 KG/M2 | HEIGHT: 59 IN | DIASTOLIC BLOOD PRESSURE: 65 MMHG | WEIGHT: 94.8 LBS | OXYGEN SATURATION: 100 %

## 2024-02-09 DIAGNOSIS — Z01.818 PREOP TESTING: ICD-10-CM

## 2024-02-09 DIAGNOSIS — Z01.818 PREOP TESTING: Primary | ICD-10-CM

## 2024-02-09 DIAGNOSIS — M81.0 OSTEOPOROSIS, UNSPECIFIED OSTEOPOROSIS TYPE, UNSPECIFIED PATHOLOGICAL FRACTURE PRESENCE: ICD-10-CM

## 2024-02-09 DIAGNOSIS — M81.0 OSTEOPOROSIS, UNSPECIFIED OSTEOPOROSIS TYPE, UNSPECIFIED PATHOLOGICAL FRACTURE PRESENCE: Primary | ICD-10-CM

## 2024-02-09 LAB
ANION GAP SERPL CALC-SCNC: 11 MMOL/L (ref 10–20)
APPEARANCE UR: CLEAR
BASOPHILS # BLD AUTO: 0.04 X10*3/UL (ref 0–0.1)
BASOPHILS NFR BLD AUTO: 0.6 %
BILIRUB UR STRIP.AUTO-MCNC: NEGATIVE MG/DL
BUN SERPL-MCNC: 22 MG/DL (ref 6–23)
CALCIUM SERPL-MCNC: 10 MG/DL (ref 8.6–10.3)
CALCIUM SERPL-MCNC: 10 MG/DL (ref 8.6–10.3)
CHLORIDE SERPL-SCNC: 100 MMOL/L (ref 98–107)
CO2 SERPL-SCNC: 30 MMOL/L (ref 21–32)
COLOR UR: YELLOW
CREAT SERPL-MCNC: 0.72 MG/DL (ref 0.5–1.05)
EGFRCR SERPLBLD CKD-EPI 2021: 87 ML/MIN/1.73M*2
EOSINOPHIL # BLD AUTO: 0.07 X10*3/UL (ref 0–0.4)
EOSINOPHIL NFR BLD AUTO: 1.1 %
ERYTHROCYTE [DISTWIDTH] IN BLOOD BY AUTOMATED COUNT: 12.4 % (ref 11.5–14.5)
GLUCOSE SERPL-MCNC: 94 MG/DL (ref 74–99)
GLUCOSE UR STRIP.AUTO-MCNC: NEGATIVE MG/DL
HCT VFR BLD AUTO: 40.8 % (ref 36–46)
HGB BLD-MCNC: 13.3 G/DL (ref 12–16)
IMM GRANULOCYTES # BLD AUTO: 0.02 X10*3/UL (ref 0–0.5)
IMM GRANULOCYTES NFR BLD AUTO: 0.3 % (ref 0–0.9)
KETONES UR STRIP.AUTO-MCNC: ABNORMAL MG/DL
LEUKOCYTE ESTERASE UR QL STRIP.AUTO: NEGATIVE
LYMPHOCYTES # BLD AUTO: 1.71 X10*3/UL (ref 0.8–3)
LYMPHOCYTES NFR BLD AUTO: 26.8 %
MCH RBC QN AUTO: 29.5 PG (ref 26–34)
MCHC RBC AUTO-ENTMCNC: 32.6 G/DL (ref 32–36)
MCV RBC AUTO: 91 FL (ref 80–100)
MONOCYTES # BLD AUTO: 0.69 X10*3/UL (ref 0.05–0.8)
MONOCYTES NFR BLD AUTO: 10.8 %
NEUTROPHILS # BLD AUTO: 3.86 X10*3/UL (ref 1.6–5.5)
NEUTROPHILS NFR BLD AUTO: 60.4 %
NITRITE UR QL STRIP.AUTO: NEGATIVE
NRBC BLD-RTO: 0 /100 WBCS (ref 0–0)
PH UR STRIP.AUTO: 6 [PH]
PLATELET # BLD AUTO: 218 X10*3/UL (ref 150–450)
POTASSIUM SERPL-SCNC: 4.7 MMOL/L (ref 3.5–5.3)
PROT UR STRIP.AUTO-MCNC: NEGATIVE MG/DL
RBC # BLD AUTO: 4.51 X10*6/UL (ref 4–5.2)
RBC # UR STRIP.AUTO: NEGATIVE /UL
SODIUM SERPL-SCNC: 136 MMOL/L (ref 136–145)
SP GR UR STRIP.AUTO: 1.02
UROBILINOGEN UR STRIP.AUTO-MCNC: <2 MG/DL
WBC # BLD AUTO: 6.4 X10*3/UL (ref 4.4–11.3)

## 2024-02-09 PROCEDURE — 80048 BASIC METABOLIC PNL TOTAL CA: CPT

## 2024-02-09 PROCEDURE — 99204 OFFICE O/P NEW MOD 45 MIN: CPT | Performed by: PHYSICIAN ASSISTANT

## 2024-02-09 PROCEDURE — 36415 COLL VENOUS BLD VENIPUNCTURE: CPT

## 2024-02-09 PROCEDURE — 85025 COMPLETE CBC W/AUTO DIFF WBC: CPT

## 2024-02-09 PROCEDURE — 81003 URINALYSIS AUTO W/O SCOPE: CPT

## 2024-02-09 PROCEDURE — 82310 ASSAY OF CALCIUM: CPT

## 2024-02-09 ASSESSMENT — ENCOUNTER SYMPTOMS
PSYCHIATRIC NEGATIVE: 1
CARDIOVASCULAR NEGATIVE: 1
NEUROLOGICAL NEGATIVE: 1
GASTROINTESTINAL NEGATIVE: 1
HEMATOLOGIC/LYMPHATIC NEGATIVE: 1
CONSTITUTIONAL NEGATIVE: 1
RESPIRATORY NEGATIVE: 1
ALLERGIC/IMMUNOLOGIC NEGATIVE: 1
ENDOCRINE NEGATIVE: 1
MUSCULOSKELETAL NEGATIVE: 1
EYES NEGATIVE: 1

## 2024-02-09 NOTE — CPM/PAT H&P
[facility] Evaluation       Name: Manjula Ríos (Manjula Ríos)  /Age: 1945/78 y.o.     In-Person       Chief Complaint: Uterovaginal prolapse     HPI      Date of Consult: 24    Referring Provider: Dr. Montoya     Surgery, Date, and Length: Vaginal Hysterectomy;Bilateral Salpingo Oophorectomy; Uterosacral Ligament Suspension; Anterior and Posterior Repair; Perineorrhaphy - Bilateral  Uterosacral Ligament Suspension  Colporrhaphy Anterior and Posterior Vaginal Wall; Perineoplasty  Cystoscopy ; 2/15/24; 210 minutes     Manjula Ríos is a 78 year-old female who presents to the [facility] for perioperative risk assessment prior to surgery. Patient with pelvic organ prolapse, mild vaginal atrophy, and pelvic floor weakness.  Admits to bulge which reduces on it's own at times or manually.  States morning urgency.  Some stress incontinence but not severe.  Denies recurrent infections.    This note was created in part upon personal review of patient's medical records.      Patient is scheduled to have Vaginal Hysterectomy;Bilateral Salpingo Oophorectomy; Uterosacral Ligament Suspension; Anterior and Posterior Repair; Perineorrhaphy - Bilateral ;Uterosacral Ligament Suspension; Colporrhaphy Anterior and Posterior Vaginal Wall; Perineoplasty  Cystoscopy     Medical History  Past Medical History:   Diagnosis Date    Adverse effect of anesthesia     very sensitive to all medication- often uses childrens dosing    Arthritis     Disorder of bone and articular cartilage     GERD (gastroesophageal reflux disease)     Hyperlipidemia     Kyphoscoliosis     Multiple lung nodules on CT     Osteoporosis     Plantar fasciitis of left foot     Rhinitis, allergic     Scoliosis     Uterovaginal prolapse         STOP BANG = 1    Caprini = 5    Surgical History  Past Surgical History:   Procedure Laterality Date    DILATION AND CURETTAGE OF UTERUS      TRIGGER FINGER RELEASE      thumb             Family history:  Family History    Problem Relation Name Age of Onset    Breast cancer Mother      Other (osteopetrosis) Mother      Ovarian cancer Mother      No Known Problems Father      Diabetes Sister      Hypertension Sister      Other (osteopetosis) Maternal Grandmother          Social history:  Social History     Socioeconomic History    Marital status:      Spouse name: Not on file    Number of children: Not on file    Years of education: Not on file    Highest education level: Not on file   Occupational History    Not on file   Tobacco Use    Smoking status: Former     Packs/day: 0.75     Years: 15.00     Additional pack years: 0.00     Total pack years: 11.25     Types: Cigarettes     Quit date:      Years since quittin.1    Smokeless tobacco: Never   Vaping Use    Vaping Use: Never used   Substance and Sexual Activity    Alcohol use: Not Currently    Drug use: Never    Sexual activity: Defer   Other Topics Concern    Not on file   Social History Narrative    Not on file     Social Determinants of Health     Financial Resource Strain: Not on file   Food Insecurity: Not on file   Transportation Needs: Not on file   Physical Activity: Not on file   Stress: Not on file   Social Connections: Not on file   Intimate Partner Violence: Not on file   Housing Stability: Not on file          Current Outpatient Medications:     ascorbic acid (Vitamin C) 500 mg tablet, Take 1 tablet (500 mg) by mouth once daily., Disp: , Rfl:     ascorbic acid/collagen hydr (COLLAGEN SKIN RENEWAL ORAL), Take 1 Dose by mouth once daily., Disp: , Rfl:     biotin 10 mg tablet, Take by mouth., Disp: , Rfl:     CALCIUM ORAL, Take 750 mg by mouth., Disp: , Rfl:     cholecalciferol (Vitamin D-3) 50 MCG (2000 UT) tablet, Take by mouth., Disp: , Rfl:     chromium amino acid chelate 400 mcg tablet, Take by mouth., Disp: , Rfl:     Cinnamon 500 mg capsule, Take by mouth., Disp: , Rfl:     coenzyme Q-10 100 mg capsule, Take by mouth., Disp: , Rfl:      "cyanocobalamin, vitamin B-12, (Vitamin B-12) 1,000 mcg tablet extended release, Take by mouth., Disp: , Rfl:     denosumab (Prolia) 60 mg/mL syringe, INJECT 60MG SUBCUTANEOUSLY EVERY 6 MONTHS (GIVEN AT MD OFFICE), Disp: 1 mL, Rfl: 1    DIETARY SUPPLEMENT ORAL, Take 1 Dose by mouth once daily. PROBIOTIC, Disp: , Rfl:     ELDERBERRY FRUIT ORAL, Take 1 Dose by mouth once daily., Disp: , Rfl:     estradiol (Estrace) 0.01 % (0.1 mg/gram) vaginal cream, Insert into the vagina., Disp: , Rfl:     tiffany prim-linoleic-gamolenic ac 1,000 mg capsule, Take by mouth., Disp: , Rfl:     EVENING PRIMROSE OIL ORAL, Take 1 Dose by mouth once daily., Disp: , Rfl:     meloxicam (Mobic) 15 mg tablet, Take by mouth., Disp: , Rfl:     multivitamin (Daily Multi-Vitamin) tablet, Take by mouth., Disp: , Rfl:     zinc gluconate 50 mg tablet, Take 1 tablet (50 mg of elemental zinc) by mouth once daily., Disp: , Rfl:          Visit Vitals  /65   Pulse 55   Temp 35.5 °C (95.9 °F)   Resp 18   Ht 1.486 m (4' 10.5\")   Wt (!) 43 kg (94 lb 12.8 oz)   SpO2 100%   BMI 19.48 kg/m²   Smoking Status Former   BSA 1.33 m²        Review of Systems   Constitutional: Negative.    HENT: Negative.          Sinus PND   Eyes: Negative.    Respiratory: Negative.     Cardiovascular: Negative.    Gastrointestinal: Negative.    Endocrine: Negative.    Genitourinary: Negative.    Musculoskeletal: Negative.    Skin: Negative.    Allergic/Immunologic: Negative.    Neurological: Negative.    Hematological: Negative.    Psychiatric/Behavioral: Negative.          Physical Exam  Vitals reviewed.   Constitutional:       Appearance: Normal appearance.   HENT:      Head: Normocephalic and atraumatic.      Right Ear: External ear normal.      Left Ear: External ear normal.      Nose: Nose normal.      Mouth/Throat:      Pharynx: Oropharynx is clear.   Eyes:      Extraocular Movements: Extraocular movements intact.      Conjunctiva/sclera: Conjunctivae normal.      Pupils: " Pupils are equal, round, and reactive to light.   Cardiovascular:      Rate and Rhythm: Normal rate and regular rhythm.      Heart sounds: Normal heart sounds.   Pulmonary:      Effort: Pulmonary effort is normal.      Breath sounds: Normal breath sounds.   Abdominal:      Palpations: Abdomen is soft.   Musculoskeletal:         General: Normal range of motion.      Cervical back: Normal range of motion and neck supple.   Skin:     General: Skin is warm and dry.   Neurological:      General: No focal deficit present.      Mental Status: She is alert and oriented to person, place, and time.   Psychiatric:         Mood and Affect: Mood normal.         Behavior: Behavior normal.          PAT AIRWAY:   Airway:     Mallampati::  II    Neck ROM::  Full    Lab Results   Component Value Date    WBC 6.4 02/09/2024    HGB 13.3 02/09/2024    HCT 40.8 02/09/2024    MCV 91 02/09/2024     02/09/2024      Lab Results   Component Value Date    GLUCOSE 94 02/09/2024    CALCIUM 10.0 02/09/2024    CALCIUM 10.0 02/09/2024     02/09/2024    K 4.7 02/09/2024    CO2 30 02/09/2024     02/09/2024    BUN 22 02/09/2024    CREATININE 0.72 02/09/2024      RCRI  0  , 3.9 % Risk of MACE    Hematology       Patient instructed to ambulate as soon as possible postoperatively to decrease thromboembolic risk.      Initiate mechanical DVT prophylaxis as soon as possible and initiate chemical prophylaxis when deemed safe from a bleeding standpoint post surgery.       VTE prophylaxis per surgical team     Tests ordered in PAT: cbc, bmp,ua   LABS REVIEWED:  unremarkable     Risk assessment complete.  Patient is scheduled for a low/intermediate surgical risk procedure.        Preoperative medication instructions were provided and reviewed with the patient.  Any additional testing or evaluation was explained to the patient.  Nothing by mouth instructions were discussed and patient's questions were answered prior to conclusion to this  encounter.  Patient verbalized understanding of preoperative instructions given in preadmission testing; discharge instructions available in EMR.    This note was dictated by a speech recognition.  Minor errors may have been detected in a speech recognition.

## 2024-02-09 NOTE — PREPROCEDURE INSTRUCTIONS
Medication List            Accurate as of February 9, 2024  1:09 PM. Always use your most recent med list.                ascorbic acid 500 mg tablet  Commonly known as: Vitamin C  Medication Adjustments for Surgery: Continue until night before surgery     biotin 10 mg tablet  Medication Adjustments for Surgery: Stop 7 days before surgery     CALCIUM ORAL  Medication Adjustments for Surgery: Continue until night before surgery     cholecalciferol 50 MCG (2000 UT) tablet  Commonly known as: Vitamin D-3  Medication Adjustments for Surgery: Continue until night before surgery     chromium amino acid chelate 400 mcg tablet  Medication Adjustments for Surgery: Stop 7 days before surgery     Cinnamon 500 mg capsule  Generic drug: cinnamon  Medication Adjustments for Surgery: Stop 7 days before surgery     coenzyme Q-10 100 mg capsule  Medication Adjustments for Surgery: Stop 7 days before surgery     COLLAGEN SKIN RENEWAL ORAL  Medication Adjustments for Surgery: Stop 7 days before surgery     Daily Multi-Vitamin tablet  Generic drug: multivitamin  Medication Adjustments for Surgery: Stop 7 days before surgery     DIETARY SUPPLEMENT ORAL  Medication Adjustments for Surgery: Stop 7 days before surgery     ELDERBERRY FRUIT ORAL  Medication Adjustments for Surgery: Stop 7 days before surgery     estradiol 0.01 % (0.1 mg/gram) vaginal cream  Commonly known as: Estrace  Notes to patient: Use if needed morning of surgery     tiffany prim-linoleic-gamolenic ac 1,000 mg capsule  Medication Adjustments for Surgery: Stop 7 days before surgery     EVENING PRIMROSE OIL ORAL  Medication Adjustments for Surgery: Stop 7 days before surgery     FIBER GUMMIES (WITH B-COMPLEX) ORAL  Medication Adjustments for Surgery: Stop 7 days before surgery     meloxicam 15 mg tablet  Commonly known as: Mobic  Medication Adjustments for Surgery: Stop 7 days before surgery     Prolia 60 mg/mL syringe  Generic drug: denosumab  INJECT 60MG SUBCUTANEOUSLY  EVERY 6 MONTHS (GIVEN AT MD OFFICE)  Notes to patient: States next date 2/26/24     Vitamin B-12 1,000 mcg tablet extended release  Generic drug: cyanocobalamin (vitamin B-12)  Medication Adjustments for Surgery: Continue until night before surgery     zinc gluconate 50 mg tablet  Medication Adjustments for Surgery: Stop 7 days before surgery                 CONTACT SURGEON'S OFFICE IF YOU DEVELOP:  * Fever = 100.4 F   * New respiratory symptoms (e.g. cough, shortness of breath, respiratory distress, sore throat)  * Recent loss of taste or smell  *Flu like symptoms such as headache, fatigue or gastrointestinal symptoms  * You develop any open sores, shingles, burning or painful urination   AND/OR:  * You no longer wish to have the surgery.  * Any other personal circumstances change that may lead to the need to cancel or defer this surgery.  *You were admitted to any hospital within one week of your planned procedure.    SMOKING:  *Quitting smoking can make a huge difference to your health and recovery from surgery.    *If you need help with quitting, call 2-363-QUIT-NOW.    THE DAY BEFORE SURGERY:  *Do not eat any food after midnight the night before surgery/procedure.   *You are permitted to drink clear liquids (i.e. water, black coffee/tea, (no milk or cream) apple juice, and electrolyte drinks (Gatorade)10 ounces up to 2 hours before your instructed arrival time to the hospital.  *You may chew gum until  2 hours before your surgery/procedure.    SURGICAL TIME  *You will be contacted between 2 p.m. and 6 p.m. the business day before your surgery with your arrival time.  *If you haven't received a call by 6pm, call [phone].  *Scheduled surgery times may change and you will be notified if this occurs-check your personal voicemail for any updates.    ON THE MORNING OF SURGERY:  *Wear comfortable, loose fitting clothing.   *Do not use moisturizers, creams, lotions or perfume.  *All jewelry and valuables should be  left at home.  *Prosthetic devices such as contact lenses, hearing aids, dentures, eyelash extensions, hairpins and body piercing must be removed before surgery.    BRING WITH YOU:  *Photo ID and insurance card  *Current list of medicines and allergies  *Pacemaker/Defibrillator/Heart stent cards  *CPAP machine and mask  *Slings/splints/crutches  *Copy of your complete Advanced Directive/DHPOA-if applicable  *Neurostimulator implant remote    PARKING AND ARRIVAL:  *Check in at the Main Entrance desk and let them know you are here for surgery.  *You will be directed to the 2nd floor surgical waiting area.    AFTER OUTPATIENT SURGERY:  *A responsible adult MUST accompany you at the time of discharge and stay with you for 24 hours after your surgery.  *You may NOT drive yourself home after surgery.  *You may use a taxi or ride sharing service ([employer], [employer]) to return home ONLY if you are accompanied by a friend or family member.  *Instructions for resuming your medications will be provided by your surgeon.

## 2024-02-10 LAB — HOLD SPECIMEN: NORMAL

## 2024-02-14 ENCOUNTER — ANESTHESIA EVENT (OUTPATIENT)
Dept: OPERATING ROOM | Facility: HOSPITAL | Age: 79
End: 2024-02-14
Payer: MEDICARE

## 2024-02-15 ENCOUNTER — TELEPHONE (OUTPATIENT)
Dept: ENDOCRINOLOGY | Facility: CLINIC | Age: 79
End: 2024-02-15
Payer: MEDICARE

## 2024-02-15 ENCOUNTER — HOSPITAL ENCOUNTER (OUTPATIENT)
Facility: HOSPITAL | Age: 79
Discharge: HOME | End: 2024-02-16
Attending: OBSTETRICS & GYNECOLOGY | Admitting: OBSTETRICS & GYNECOLOGY
Payer: MEDICARE

## 2024-02-15 ENCOUNTER — ANESTHESIA (OUTPATIENT)
Dept: OPERATING ROOM | Facility: HOSPITAL | Age: 79
End: 2024-02-15
Payer: MEDICARE

## 2024-02-15 DIAGNOSIS — E55.9 VITAMIN D DEFICIENCY: ICD-10-CM

## 2024-02-15 DIAGNOSIS — E55.9 VITAMIN D DEFICIENCY: Primary | ICD-10-CM

## 2024-02-15 DIAGNOSIS — Z90.710 S/P HYSTERECTOMY: Primary | ICD-10-CM

## 2024-02-15 DIAGNOSIS — N81.4 UTEROVAGINAL PROLAPSE: ICD-10-CM

## 2024-02-15 PROCEDURE — 2500000004 HC RX 250 GENERAL PHARMACY W/ HCPCS (ALT 636 FOR OP/ED): Performed by: ANESTHESIOLOGIST ASSISTANT

## 2024-02-15 PROCEDURE — 3700000002 HC GENERAL ANESTHESIA TIME - EACH INCREMENTAL 1 MINUTE: Performed by: OBSTETRICS & GYNECOLOGY

## 2024-02-15 PROCEDURE — A58262 PR VAG HYST,RMV TUBE/OVARY: Performed by: ANESTHESIOLOGY

## 2024-02-15 PROCEDURE — 7100000002 HC RECOVERY ROOM TIME - EACH INCREMENTAL 1 MINUTE: Performed by: OBSTETRICS & GYNECOLOGY

## 2024-02-15 PROCEDURE — 2500000004 HC RX 250 GENERAL PHARMACY W/ HCPCS (ALT 636 FOR OP/ED): Performed by: STUDENT IN AN ORGANIZED HEALTH CARE EDUCATION/TRAINING PROGRAM

## 2024-02-15 PROCEDURE — 3600000009 HC OR TIME - EACH INCREMENTAL 1 MINUTE - PROCEDURE LEVEL FOUR: Performed by: OBSTETRICS & GYNECOLOGY

## 2024-02-15 PROCEDURE — 58262 VAG HYST INCLUDING T/O: CPT | Performed by: OBSTETRICS & GYNECOLOGY

## 2024-02-15 PROCEDURE — 3700000001 HC GENERAL ANESTHESIA TIME - INITIAL BASE CHARGE: Performed by: OBSTETRICS & GYNECOLOGY

## 2024-02-15 PROCEDURE — 99100 ANES PT EXTEME AGE<1 YR&>70: CPT | Performed by: ANESTHESIOLOGY

## 2024-02-15 PROCEDURE — 2500000005 HC RX 250 GENERAL PHARMACY W/O HCPCS: Performed by: ANESTHESIOLOGIST ASSISTANT

## 2024-02-15 PROCEDURE — 7100000001 HC RECOVERY ROOM TIME - INITIAL BASE CHARGE: Performed by: OBSTETRICS & GYNECOLOGY

## 2024-02-15 PROCEDURE — 7100000011 HC EXTENDED STAY RECOVERY HOURLY - NURSING UNIT

## 2024-02-15 PROCEDURE — 3600000004 HC OR TIME - INITIAL BASE CHARGE - PROCEDURE LEVEL FOUR: Performed by: OBSTETRICS & GYNECOLOGY

## 2024-02-15 PROCEDURE — 2500000005 HC RX 250 GENERAL PHARMACY W/O HCPCS: Performed by: OBSTETRICS & GYNECOLOGY

## 2024-02-15 PROCEDURE — 88305 TISSUE EXAM BY PATHOLOGIST: CPT | Mod: TC,SUR,AHULAB | Performed by: OBSTETRICS & GYNECOLOGY

## 2024-02-15 PROCEDURE — 57283 COLPOPEXY INTRAPERITONEAL: CPT | Performed by: OBSTETRICS & GYNECOLOGY

## 2024-02-15 PROCEDURE — 88305 TISSUE EXAM BY PATHOLOGIST: CPT | Performed by: PATHOLOGY

## 2024-02-15 PROCEDURE — 2720000007 HC OR 272 NO HCPCS: Performed by: OBSTETRICS & GYNECOLOGY

## 2024-02-15 PROCEDURE — A58262 PR VAG HYST,RMV TUBE/OVARY: Performed by: ANESTHESIOLOGIST ASSISTANT

## 2024-02-15 RX ORDER — LIDOCAINE HYDROCHLORIDE 10 MG/ML
0.1 INJECTION, SOLUTION EPIDURAL; INFILTRATION; INTRACAUDAL; PERINEURAL ONCE
Status: DISCONTINUED | OUTPATIENT
Start: 2024-02-15 | End: 2024-02-15 | Stop reason: HOSPADM

## 2024-02-15 RX ORDER — ROCURONIUM BROMIDE 10 MG/ML
INJECTION, SOLUTION INTRAVENOUS AS NEEDED
Status: DISCONTINUED | OUTPATIENT
Start: 2024-02-15 | End: 2024-02-15

## 2024-02-15 RX ORDER — POLYETHYLENE GLYCOL 3350 17 G/17G
17 POWDER, FOR SOLUTION ORAL DAILY
Status: DISCONTINUED | OUTPATIENT
Start: 2024-02-15 | End: 2024-02-16 | Stop reason: HOSPADM

## 2024-02-15 RX ORDER — MIDAZOLAM HYDROCHLORIDE 1 MG/ML
INJECTION INTRAMUSCULAR; INTRAVENOUS AS NEEDED
Status: DISCONTINUED | OUTPATIENT
Start: 2024-02-15 | End: 2024-02-15

## 2024-02-15 RX ORDER — HEPARIN SODIUM 5000 [USP'U]/ML
5000 INJECTION, SOLUTION INTRAVENOUS; SUBCUTANEOUS ONCE
Status: COMPLETED | OUTPATIENT
Start: 2024-02-15 | End: 2024-02-15

## 2024-02-15 RX ORDER — NALOXONE HYDROCHLORIDE 0.4 MG/ML
0.1 INJECTION, SOLUTION INTRAMUSCULAR; INTRAVENOUS; SUBCUTANEOUS EVERY 5 MIN PRN
Status: DISCONTINUED | OUTPATIENT
Start: 2024-02-15 | End: 2024-02-16 | Stop reason: HOSPADM

## 2024-02-15 RX ORDER — HEPARIN SODIUM 5000 [USP'U]/ML
5000 INJECTION, SOLUTION INTRAVENOUS; SUBCUTANEOUS EVERY 8 HOURS
Status: DISCONTINUED | OUTPATIENT
Start: 2024-02-15 | End: 2024-02-16 | Stop reason: HOSPADM

## 2024-02-15 RX ORDER — LIDOCAINE HYDROCHLORIDE 20 MG/ML
INJECTION, SOLUTION INFILTRATION; PERINEURAL AS NEEDED
Status: DISCONTINUED | OUTPATIENT
Start: 2024-02-15 | End: 2024-02-15

## 2024-02-15 RX ORDER — PHENAZOPYRIDINE HYDROCHLORIDE 100 MG/1
200 TABLET, FILM COATED ORAL ONCE
Status: DISCONTINUED | OUTPATIENT
Start: 2024-02-15 | End: 2024-02-15 | Stop reason: HOSPADM

## 2024-02-15 RX ORDER — ACETAMINOPHEN 325 MG/1
975 TABLET ORAL ONCE
Status: DISCONTINUED | OUTPATIENT
Start: 2024-02-15 | End: 2024-02-15 | Stop reason: HOSPADM

## 2024-02-15 RX ORDER — CEFAZOLIN SODIUM 2 G/100ML
2 INJECTION, SOLUTION INTRAVENOUS ONCE
Status: DISCONTINUED | OUTPATIENT
Start: 2024-02-15 | End: 2024-02-15 | Stop reason: HOSPADM

## 2024-02-15 RX ORDER — FENTANYL CITRATE 50 UG/ML
INJECTION, SOLUTION INTRAMUSCULAR; INTRAVENOUS AS NEEDED
Status: DISCONTINUED | OUTPATIENT
Start: 2024-02-15 | End: 2024-02-15

## 2024-02-15 RX ORDER — LABETALOL HYDROCHLORIDE 5 MG/ML
5 INJECTION, SOLUTION INTRAVENOUS ONCE AS NEEDED
Status: DISCONTINUED | OUTPATIENT
Start: 2024-02-15 | End: 2024-02-15 | Stop reason: HOSPADM

## 2024-02-15 RX ORDER — METHADONE HYDROCHLORIDE 10 MG/1
TABLET ORAL AS NEEDED
Status: DISCONTINUED | OUTPATIENT
Start: 2024-02-15 | End: 2024-02-15

## 2024-02-15 RX ORDER — TRAMADOL HYDROCHLORIDE 50 MG/1
50 TABLET ORAL EVERY 6 HOURS PRN
Status: DISCONTINUED | OUTPATIENT
Start: 2024-02-15 | End: 2024-02-16 | Stop reason: HOSPADM

## 2024-02-15 RX ORDER — ONDANSETRON HYDROCHLORIDE 2 MG/ML
4 INJECTION, SOLUTION INTRAVENOUS ONCE AS NEEDED
Status: DISCONTINUED | OUTPATIENT
Start: 2024-02-15 | End: 2024-02-15 | Stop reason: HOSPADM

## 2024-02-15 RX ORDER — DEXAMETHASONE SODIUM PHOSPHATE 4 MG/ML
INJECTION, SOLUTION INTRA-ARTICULAR; INTRALESIONAL; INTRAMUSCULAR; INTRAVENOUS; SOFT TISSUE AS NEEDED
Status: DISCONTINUED | OUTPATIENT
Start: 2024-02-15 | End: 2024-02-15

## 2024-02-15 RX ORDER — SODIUM CHLORIDE, SODIUM LACTATE, POTASSIUM CHLORIDE, CALCIUM CHLORIDE 600; 310; 30; 20 MG/100ML; MG/100ML; MG/100ML; MG/100ML
100 INJECTION, SOLUTION INTRAVENOUS CONTINUOUS
Status: DISCONTINUED | OUTPATIENT
Start: 2024-02-15 | End: 2024-02-15

## 2024-02-15 RX ORDER — ONDANSETRON HYDROCHLORIDE 2 MG/ML
INJECTION, SOLUTION INTRAVENOUS AS NEEDED
Status: DISCONTINUED | OUTPATIENT
Start: 2024-02-15 | End: 2024-02-15

## 2024-02-15 RX ORDER — DIPHENHYDRAMINE HYDROCHLORIDE 50 MG/ML
12.5 INJECTION INTRAMUSCULAR; INTRAVENOUS ONCE AS NEEDED
Status: DISCONTINUED | OUTPATIENT
Start: 2024-02-15 | End: 2024-02-15 | Stop reason: HOSPADM

## 2024-02-15 RX ORDER — IBUPROFEN 600 MG/1
600 TABLET ORAL EVERY 6 HOURS
Status: CANCELLED | OUTPATIENT
Start: 2024-02-16

## 2024-02-15 RX ORDER — ACETAMINOPHEN 500 MG
1000 TABLET ORAL EVERY 6 HOURS PRN
Qty: 60 TABLET | Refills: 1 | Status: SHIPPED | OUTPATIENT
Start: 2024-02-15 | End: 2024-03-11

## 2024-02-15 RX ORDER — ONDANSETRON HYDROCHLORIDE 2 MG/ML
4 INJECTION, SOLUTION INTRAVENOUS EVERY 6 HOURS PRN
Status: DISCONTINUED | OUTPATIENT
Start: 2024-02-15 | End: 2024-02-16 | Stop reason: HOSPADM

## 2024-02-15 RX ORDER — SIMETHICONE 80 MG
80 TABLET,CHEWABLE ORAL 4 TIMES DAILY PRN
Status: DISCONTINUED | OUTPATIENT
Start: 2024-02-15 | End: 2024-02-16 | Stop reason: HOSPADM

## 2024-02-15 RX ORDER — CEFAZOLIN 1 G/1
INJECTION, POWDER, FOR SOLUTION INTRAVENOUS AS NEEDED
Status: DISCONTINUED | OUTPATIENT
Start: 2024-02-15 | End: 2024-02-15

## 2024-02-15 RX ORDER — PROPOFOL 10 MG/ML
INJECTION, EMULSION INTRAVENOUS AS NEEDED
Status: DISCONTINUED | OUTPATIENT
Start: 2024-02-15 | End: 2024-02-15

## 2024-02-15 RX ORDER — OXYCODONE HYDROCHLORIDE 5 MG/1
5 TABLET ORAL EVERY 4 HOURS PRN
Status: DISCONTINUED | OUTPATIENT
Start: 2024-02-15 | End: 2024-02-15 | Stop reason: HOSPADM

## 2024-02-15 RX ORDER — KETOROLAC TROMETHAMINE 30 MG/ML
15 INJECTION, SOLUTION INTRAMUSCULAR; INTRAVENOUS EVERY 6 HOURS
Status: CANCELLED | OUTPATIENT
Start: 2024-02-15 | End: 2024-02-16

## 2024-02-15 RX ORDER — LIDOCAINE HYDROCHLORIDE AND EPINEPHRINE 10; 10 MG/ML; UG/ML
INJECTION, SOLUTION INFILTRATION; PERINEURAL AS NEEDED
Status: DISCONTINUED | OUTPATIENT
Start: 2024-02-15 | End: 2024-02-15 | Stop reason: HOSPADM

## 2024-02-15 RX ORDER — KETOROLAC TROMETHAMINE 30 MG/ML
INJECTION, SOLUTION INTRAMUSCULAR; INTRAVENOUS AS NEEDED
Status: DISCONTINUED | OUTPATIENT
Start: 2024-02-15 | End: 2024-02-15

## 2024-02-15 RX ORDER — TRAMADOL HYDROCHLORIDE 50 MG/1
100 TABLET ORAL EVERY 6 HOURS PRN
Status: DISCONTINUED | OUTPATIENT
Start: 2024-02-15 | End: 2024-02-16 | Stop reason: HOSPADM

## 2024-02-15 RX ORDER — SODIUM CHLORIDE, SODIUM LACTATE, POTASSIUM CHLORIDE, CALCIUM CHLORIDE 600; 310; 30; 20 MG/100ML; MG/100ML; MG/100ML; MG/100ML
40 INJECTION, SOLUTION INTRAVENOUS CONTINUOUS
Status: DISCONTINUED | OUTPATIENT
Start: 2024-02-15 | End: 2024-02-16 | Stop reason: HOSPADM

## 2024-02-15 RX ORDER — POLYETHYLENE GLYCOL 3350 17 G/17G
17 POWDER, FOR SOLUTION ORAL DAILY
Qty: 20 PACKET | Refills: 1 | Status: SHIPPED | OUTPATIENT
Start: 2024-02-15

## 2024-02-15 RX ORDER — CELECOXIB 200 MG/1
400 CAPSULE ORAL ONCE
Status: DISCONTINUED | OUTPATIENT
Start: 2024-02-15 | End: 2024-02-15

## 2024-02-15 RX ORDER — TRAMADOL HYDROCHLORIDE 50 MG/1
50 TABLET ORAL EVERY 8 HOURS PRN
Qty: 10 TABLET | Refills: 0 | Status: SHIPPED | OUTPATIENT
Start: 2024-02-15 | End: 2024-03-11

## 2024-02-15 RX ORDER — SODIUM CHLORIDE, SODIUM LACTATE, POTASSIUM CHLORIDE, CALCIUM CHLORIDE 600; 310; 30; 20 MG/100ML; MG/100ML; MG/100ML; MG/100ML
100 INJECTION, SOLUTION INTRAVENOUS CONTINUOUS
Status: DISCONTINUED | OUTPATIENT
Start: 2024-02-15 | End: 2024-02-15 | Stop reason: HOSPADM

## 2024-02-15 RX ORDER — ACETAMINOPHEN 325 MG/1
975 TABLET ORAL EVERY 6 HOURS
Status: DISCONTINUED | OUTPATIENT
Start: 2024-02-15 | End: 2024-02-16 | Stop reason: HOSPADM

## 2024-02-15 RX ADMIN — ONDANSETRON 4 MG: 2 INJECTION INTRAMUSCULAR; INTRAVENOUS at 16:51

## 2024-02-15 RX ADMIN — ROCURONIUM BROMIDE 50 MG: 10 INJECTION, SOLUTION INTRAVENOUS at 15:08

## 2024-02-15 RX ADMIN — PROPOFOL 20 MG: 10 INJECTION, EMULSION INTRAVENOUS at 16:57

## 2024-02-15 RX ADMIN — HEPARIN SODIUM 5000 UNITS: 5000 INJECTION INTRAVENOUS; SUBCUTANEOUS at 21:06

## 2024-02-15 RX ADMIN — MIDAZOLAM HYDROCHLORIDE 1 MG: 1 INJECTION, SOLUTION INTRAMUSCULAR; INTRAVENOUS at 15:08

## 2024-02-15 RX ADMIN — PROPOFOL 10 MG: 10 INJECTION, EMULSION INTRAVENOUS at 17:21

## 2024-02-15 RX ADMIN — KETOROLAC TROMETHAMINE 15 MG: 30 INJECTION, SOLUTION INTRAMUSCULAR; INTRAVENOUS at 16:51

## 2024-02-15 RX ADMIN — LIDOCAINE HYDROCHLORIDE 50 MG: 20 INJECTION, SOLUTION INFILTRATION; PERINEURAL at 15:08

## 2024-02-15 RX ADMIN — POLYETHYLENE GLYCOL 3350 17 G: 17 POWDER, FOR SOLUTION ORAL at 21:09

## 2024-02-15 RX ADMIN — ROCURONIUM BROMIDE 10 MG: 10 INJECTION, SOLUTION INTRAVENOUS at 16:12

## 2024-02-15 RX ADMIN — HEPARIN SODIUM 5000 UNITS: 5000 INJECTION INTRAVENOUS; SUBCUTANEOUS at 11:12

## 2024-02-15 RX ADMIN — CEFAZOLIN 2 G: 330 INJECTION, POWDER, FOR SOLUTION INTRAMUSCULAR; INTRAVENOUS at 15:08

## 2024-02-15 RX ADMIN — FENTANYL CITRATE 25 MCG: 50 INJECTION, SOLUTION INTRAMUSCULAR; INTRAVENOUS at 16:47

## 2024-02-15 RX ADMIN — DEXAMETHASONE SODIUM PHOSPHATE 4 MG: 4 INJECTION, SOLUTION INTRAMUSCULAR; INTRAVENOUS at 16:38

## 2024-02-15 RX ADMIN — SUGAMMADEX 200 MG: 100 INJECTION, SOLUTION INTRAVENOUS at 17:21

## 2024-02-15 RX ADMIN — SODIUM CHLORIDE, POTASSIUM CHLORIDE, SODIUM LACTATE AND CALCIUM CHLORIDE: 600; 310; 30; 20 INJECTION, SOLUTION INTRAVENOUS at 15:03

## 2024-02-15 RX ADMIN — PROPOFOL 100 MG: 10 INJECTION, EMULSION INTRAVENOUS at 15:08

## 2024-02-15 RX ADMIN — FENTANYL CITRATE 25 MCG: 50 INJECTION, SOLUTION INTRAMUSCULAR; INTRAVENOUS at 16:00

## 2024-02-15 RX ADMIN — FENTANYL CITRATE 25 MCG: 50 INJECTION, SOLUTION INTRAMUSCULAR; INTRAVENOUS at 15:08

## 2024-02-15 RX ADMIN — ACETAMINOPHEN 975 MG: 325 TABLET ORAL at 20:38

## 2024-02-15 RX ADMIN — PROPOFOL 20 MG: 10 INJECTION, EMULSION INTRAVENOUS at 17:10

## 2024-02-15 RX ADMIN — SODIUM CHLORIDE, POTASSIUM CHLORIDE, SODIUM LACTATE AND CALCIUM CHLORIDE 40 ML/HR: 600; 310; 30; 20 INJECTION, SOLUTION INTRAVENOUS at 21:06

## 2024-02-15 ASSESSMENT — PAIN SCALES - GENERAL
PAINLEVEL_OUTOF10: 0 - NO PAIN

## 2024-02-15 ASSESSMENT — PAIN - FUNCTIONAL ASSESSMENT
PAIN_FUNCTIONAL_ASSESSMENT: 0-10

## 2024-02-15 ASSESSMENT — ACTIVITIES OF DAILY LIVING (ADL)
ADEQUATE_TO_COMPLETE_ADL: YES
PATIENT'S MEMORY ADEQUATE TO SAFELY COMPLETE DAILY ACTIVITIES?: YES
JUDGMENT_ADEQUATE_SAFELY_COMPLETE_DAILY_ACTIVITIES: YES
HEARING - RIGHT EAR: FUNCTIONAL
FEEDING YOURSELF: INDEPENDENT
WALKS IN HOME: INDEPENDENT
HEARING - LEFT EAR: FUNCTIONAL
TOILETING: INDEPENDENT
BATHING: INDEPENDENT
GROOMING: INDEPENDENT
DRESSING YOURSELF: INDEPENDENT

## 2024-02-15 ASSESSMENT — COLUMBIA-SUICIDE SEVERITY RATING SCALE - C-SSRS
2. HAVE YOU ACTUALLY HAD ANY THOUGHTS OF KILLING YOURSELF?: NO
1. IN THE PAST MONTH, HAVE YOU WISHED YOU WERE DEAD OR WISHED YOU COULD GO TO SLEEP AND NOT WAKE UP?: NO
6. HAVE YOU EVER DONE ANYTHING, STARTED TO DO ANYTHING, OR PREPARED TO DO ANYTHING TO END YOUR LIFE?: NO

## 2024-02-15 NOTE — TELEPHONE ENCOUNTER
Pt called in requesting to have orders placed for vitamin d as it was not checked wth her calcium levels. Pt is going to be at the lab this morning .

## 2024-02-15 NOTE — POST-PROCEDURE NOTE
1728: Patient arrived to [place] after procedure with Anesthesia and procedure RN, procedure discussed, plan reviewed, VSS  1732: updated family via text  1743: voceralesley Leong RN  1753: secure messaged report to Nirmala Leong RN  1754: family updated via phone  1755: Pt put in for transportation  1804: Transportation at bedside  1811: Pt transported via stretcher to  [id number]

## 2024-02-15 NOTE — H&P
History Of Present Illness  Manjula Ríos is a 78 y.o. female with symptomatic stage II anterior and apical pelvic organ prolapse, mild vaginal atrophy, and pelvic floor weakness presenting for vaginal hysterectomy, bilateral salpingo-oophorectomy, uterosacral ligament suspension, possible anterior and posterior repair, possible perineorrhaphy, cystoscopy, any other indicated procedures.     Past Medical History  Past Medical History:   Diagnosis Date    Adverse effect of anesthesia     very sensitive to all medication- often uses childrens dosing    Arthritis     GERD (gastroesophageal reflux disease)     asymptomatic    Hyperlipidemia     Kyphoscoliosis     Osteoporosis     Rhinitis, allergic     Scoliosis     Uterovaginal prolapse        Surgical History  Past Surgical History:   Procedure Laterality Date    DILATION AND CURETTAGE OF UTERUS      TRIGGER FINGER RELEASE      thumb        Social History  She reports that she quit smoking about 37 years ago. Her smoking use included cigarettes. She has a 11.25 pack-year smoking history. She has never used smokeless tobacco. She reports current alcohol use. She reports that she does not use drugs.    Family History  Family History   Problem Relation Name Age of Onset    Breast cancer Mother      Other (osteopetrosis) Mother      Ovarian cancer Mother      No Known Problems Father      Diabetes Sister      Hypertension Sister      Other (osteopetosis) Maternal Grandmother          Allergies  Cat dander, House dust, Niacin, Hay fever and allergy relief, Naproxen, and Rosuvastatin    Review of Systems   All other systems reviewed and are negative.       Physical Exam  Constitutional:       General: She is not in acute distress.     Appearance: Normal appearance.   HENT:      Head: Normocephalic and atraumatic.      Nose: Nose normal.   Eyes:      General: No scleral icterus.  Cardiovascular:      Rate and Rhythm: Normal rate.   Pulmonary:      Effort: Pulmonary effort  "is normal.   Abdominal:      Palpations: Abdomen is soft.   Musculoskeletal:         General: Normal range of motion.      Cervical back: Normal range of motion.   Skin:     General: Skin is warm and dry.   Neurological:      General: No focal deficit present.      Mental Status: She is alert and oriented to person, place, and time.   Psychiatric:         Mood and Affect: Mood normal.         Behavior: Behavior normal.          Last Recorded Vitals  Blood pressure 110/65, pulse 65, temperature 37.4 °C (99.3 °F), resp. rate 16, height 1.486 m (4' 10.5\"), weight 45 kg (99 lb 3.3 oz).       Assessment/Plan   Principal Problem:    Uterovaginal prolapse    Manjula Ríos is a 78 y.o. female with symptomatic stage II anterior and apical pelvic organ prolapse, mild vaginal atrophy, and pelvic floor weakness presenting for vaginal hysterectomy, bilateral salpingo-oophorectomy, uterosacral ligament suspension, possible anterior and posterior repair, possible perineorrhaphy, cystoscopy, any other indicated procedures.    Alyssa Tavera MD    "

## 2024-02-15 NOTE — ANESTHESIA PROCEDURE NOTES
Airway  Date/Time: 2/15/2024 3:11 PM  Urgency: elective    Airway not difficult    Staffing  Performed: PILY   Authorized by: Jeremy Hall MD    Performed by: PILY Connor  Patient location during procedure: OR    Indications and Patient Condition  Indications for airway management: anesthesia  Spontaneous Ventilation: absent  Sedation level: deep  Preoxygenated: yes  Patient position: sniffing  Mask difficulty assessment: 1 - vent by mask    Final Airway Details  Final airway type: endotracheal airway      Successful airway: ETT  Cuffed: yes   Successful intubation technique: direct laryngoscopy  Endotracheal tube insertion site: oral  Blade: Harpreet  Blade size: #3  ETT size (mm): 6.5  Cormack-Lehane Classification: grade I - full view of glottis  Placement verified by: chest auscultation and capnometry   Measured from: lips  ETT to lips (cm): 20  Number of attempts at approach: 1

## 2024-02-15 NOTE — ANESTHESIA POSTPROCEDURE EVALUATION
Patient: Manjula Ríos    Procedure Summary       Date: 02/15/24 Room / Location: [facility] / [facility]    Anesthesia Start: 1453 Anesthesia Stop: 1731    Procedures:       Vaginal Hysterectomy;Bilateral Salpingectomy; Uterosacral Ligament Suspension; Perineorrhaphy; Cystoscopy (Bilateral)      Uterosacral Ligament Suspension      Cystoscopy Diagnosis:       Uterovaginal prolapse      (Uterovaginal prolapse [N81.4])    Surgeons: Ignacio Montoya MD Responsible Provider: Jeremy Hall MD    Anesthesia Type: general ASA Status: 2            Anesthesia Type: general    Vitals Value Taken Time   /63 02/15/24 1801   Temp 36.5 °C (97.7 °F) 02/15/24 1728   Pulse 71 02/15/24 1809   Resp 16 02/15/24 1800   SpO2 98 % 02/15/24 1810   Vitals shown include unvalidated device data.    Anesthesia Post Evaluation    Patient location during evaluation: PACU  Patient participation: complete - patient participated  Level of consciousness: awake and alert  Pain management: adequate  Airway patency: patent  Cardiovascular status: acceptable and hemodynamically stable  Respiratory status: acceptable, spontaneous ventilation and nonlabored ventilation  Hydration status: acceptable  Postoperative Nausea and Vomiting: none        There were no known notable events for this encounter.

## 2024-02-15 NOTE — OP NOTE
Vaginal Hysterectomy;Bilateral Salpingectomy; Uterosacral Ligament Suspension; Perineorrhaphy; Cystoscopy (B), Uterosacral Ligament Suspension, Cystoscopy Operative Note     Date: 2/15/2024  OR Location: [facility] A OR    Name: Manjula Ríos, : 1945, Age: 78 y.o., MRN: [id number], Sex: female    Diagnosis  Pre-op Diagnosis     * Uterovaginal prolapse [N81.4] Post-op Diagnosis     * Uterovaginal prolapse [N81.4]     Procedures  Vaginal Hysterectomy;Bilateral Salpingectomy; Uterosacral Ligament Suspension; Perineorrhaphy; Cystoscopy  58992 - HI VAG HYST 250 GM/< W/RMVL TUBE&/OVARY    Uterosacral Ligament Suspension  30480 - HI COLPOPEXY VAGINAL INTRAPERITONEAL APPROACH    Cystoscopy  06262 - HI CYSTOURETHROSCOPY      Surgeons      * Ignacio Montoya - Primary    Resident/Fellow/Other Assistant:  Surgeon(s) and Role:     * Mendez Inman MD - Resident - Assisting    Procedure Summary  Anesthesia: General  ASA: II  Anesthesia Staff: Anesthesiologist: Jeremy Hall MD  C-AA: PILY Connor; PILY Way  Estimated Blood Loss: 25 mL  Intra-op Medications:   Administrations occurring from 1230 to 1600 on 02/15/24:   Medication Name Total Dose   lactated Ringer's infusion Cannot be calculated              Anesthesia Record               Intraprocedure I/O Totals          Output    Urine 600 mL    Est. Blood Loss 50 mL    Total Output 650 mL          Specimen:   ID Type Source Tests Collected by Time   1 : UTERUS , CERVIX, &  BILATERAL FALLOPIAN TUBES Tissue UTERUS, CERVIX, AND BILATERAL FALLOPIAN TUBES SURGICAL PATHOLOGY EXAM Ignacio Montoya MD 2/15/2024 1615        Staff:   Circulator: Margaux Brown RN  Relief Circulator: Yajaira Corbin RN; Kathy Cueto RN  Scrub Person: Adiel Mauro         Drains and/or Catheters:   Urethral Catheter (Active)   Site Assessment Clean;Skin intact 02/15/24 1733   Collection Container Standard drainage bag 02/15/24 1733   Securement Method Securing device  (Describe) 02/15/24 1733   Reason for Continuing Urinary Catheterization surgical procedures: urological/gynecological, pelvic oncology, anal, prolonged surgical procedure 02/15/24 1733       Tourniquet Times:         Implants:     Findings: Normal-appearing uterus with ovarian streaks on the right and the left adherent to the pelvic sidewalls.  Normal-appearing bladder mucosa and ureteral orifices in the normal orthotopic position.  There was no flow noted from the right ureter.  With removal of the most lateral uterosacral ligament suspension stitch, flow was noted.  No other abnormalities were appreciated.    Indications: Manjula Ríos is an 78 y.o. female who is having surgery for Uterovaginal prolapse [N81.4].  She has a symptomatic vaginal bulge which is significantly interfering with her quality of life.  She has failed pessary management and strongly desires to proceed with definitive surgical management.  She did not desire foreign material and is elected to proceed with the uterosacral ligament suspension.  Risks benefits and alternatives were discussed with the patient and consent was signed and placed in the chart.    The patient was seen in the preoperative area. The risks, benefits, complications, treatment options, non-operative alternatives, expected recovery and outcomes were discussed with the patient. The possibilities of reaction to medication, pulmonary aspiration, injury to surrounding structures, bleeding, recurrent infection, the need for additional procedures, failure to diagnose a condition, and creating a complication requiring transfusion or operation were discussed with the patient. The patient concurred with the proposed plan, giving informed consent.  The site of surgery was properly noted/marked if necessary per policy. The patient has been actively warmed in preoperative area. Preoperative antibiotics have been ordered and given within 1 hours of incision. Venous thrombosis  prophylaxis have been ordered including bilateral sequential compression devices    Procedure Details: After consent was signed and placed in the chart the patient taken the operating room where anesthesia was found to be adequate.  The patient was prepared and draped in normal sterile fashion in dorsal supine position in yellowfin stirrups.  Patient received 2 g of Ancef prior to incision.  A Erazo catheter was then placed to down drain and a [place] retractor placed.  The anterior and posterior lip of the cervix were grasped with single-tooth tenaculum's and the cervical vaginal junction identified and marked with the Bovie cautery.  This area was then injected with 1% lidocaine in circumferential fashion and using the Bovie cautery this area was incised.  The posterior colpotomy was then made sharply with the Fields scissors and the posterior peritoneum tacked to the posterior vaginal cuff with a single 0 Vicryl suture.  The anterior colpotomy was then made sharply with the Fields scissors and a [place] retractor placed thus protecting the bladder anteriorly.  The right uterosacral ligament cardinal ligament complex was then clamped with a Hero clamp cut and suture-ligated with 0 Vicryl suture.  This was tagged for future identification.  The left uterosacral ligament was similarly clamped cut and suture-ligated.  In a sequential fashion the vessels were clamped cut and suture-ligated on the right and the left with a Hero clamp and 0 Vicryl suture as well as the broad ligament, round ligament, and final suspensory pedicle.  A free 0 Vicryl suture was used on the pedicle as well as a sutured 0 Vicryl to ligate the upper pedicle.  The tubes were then identified on the right and the left and the ovaries noted to be well lateral and superior.  They appeared completely normal but were adherent to the lateral sidewalls.  The right tubal mesosalpinx was clamped with a Brigette and the tube amputated with Metkangbaum  scissors.  A free tie of 0 Vicryl was then used over this area.  In a similar fashion the left tube was identified and a Brigette taken across the mesosalpinx and tube and the tube was amputated with Metzenbaum scissors.  A second 0 Vicryl free tie was used over this area.  Excellent hemostasis was appreciated.  2 moistened lap sponges were then used to pack the bowel superiorly.  The Brisky retractors were then used to easily identify the pelvis and uterosacral ligament on the right.  This was grasped with a long Allis clamp and 3 of the 0 PDS sutures were then delivered through this ligament in a distal to proximal fashion in a lateral to medial direction.  These were tagged for future identification.  In a similar fashion the left uterosacral ligament was easily identified, grasped with the Allis clamp and 3 of the 0 PDS sutures were delivered through this ligament.  The free end of the 0 PDS sutures were then delivered through the anterior peritoneum and vaginal cuff in a sequential fashion.  The sutured end of the 0 PDS was then delivered through the posterior peritoneum and vaginal cuff in a sequential fashion.  These were then tied down thus closing the vaginal cuff and elevating the vagina to a C point of -9.  The Erazo catheter was then removed and a cystoscope was performed.  Normal flow was noted on the left but no significant flow was noted on the right and side.  The most lateral PDS suture on the right vaginal cuff was removed and flow noted from this orifice.  A figure of 8-0 Vicryl suture was then placed through the vaginal mucosa to close this defect.  The Erazo catheter was replaced.    Attention was then turned to the perineorrhaphy.  The lateral aspects of the posterior fourchette were grasped with the Allis clamps and a rocío-shaped incision made over the posterior vaginal mucosa and perineal epithelium after this area was injected with 1% lidocaine with epinephrine.  The mucosa and epithelium  were removed and the underlying tissues mobilized with the Metzenbaum scissors.  An 0 Vicryl suture was then utilized to plicate the posterior aspect of the vagina and reapproximate the bulbocavernosus and spongiosis perineal body.  The mucosa and epithelium was then closed with a running 2-0 Vicryl suture.    The procedure was terminated.  Sponge lap needle counts correct x 2.  The patient is taken the postanesthesia care unit in stable condition.  She will undergo a voiding trial on postoperative day #1.  Complications:  None; patient tolerated the procedure well.    Disposition: PACU - hemodynamically stable.  Condition: stable         Additional Details:     Attending Attestation:     Ignacio Montoya  Phone Number: [phone]

## 2024-02-15 NOTE — ANESTHESIA PREPROCEDURE EVALUATION
Patient: Manjula Ríos    Procedure Information       Date/Time: 02/15/24 1230    Procedures:       Vaginal Hysterectomy;Bilateral Salpingo Oophorectomy; Uterosacral Ligament Suspension; Anterior and Posterior Repair; Perineorrhaphy (Bilateral)      Uterosacral Ligament Suspension      Colporrhaphy Anterior and Posterior Vaginal Wall; Perineoplasty      Cystoscopy    Location: [facility] A OR 08 / Virtual [facility] A OR    Surgeons: Ignacio Montoya MD            Relevant Problems   Other   (+) Arthritis of knee, right       Clinical information reviewed:   Tobacco  Allergies  Meds   Med Hx  Surg Hx   Fam Hx  Soc Hx        NPO Detail:  NPO/Void Status  Carbohydrate Drink Given Prior to Surgery? : N  Date of Last Liquid: 02/15/24  Time of Last Liquid: 0630  Date of Last Solid: 02/14/24  Time of Last Solid: 1830  Last Intake Type: Clear fluids  Time of Last Void: 0945         Physical Exam    Airway  Mallampati: II  TM distance: >3 FB  Neck ROM: full     Cardiovascular   Rhythm: regular  Rate: normal     Dental    Pulmonary   Breath sounds clear to auscultation     Abdominal            Anesthesia Plan    History of general anesthesia?: yes  History of complications of general anesthesia?: no    ASA 2     general     intravenous induction   Anesthetic plan and risks discussed with patient.    Plan discussed with CRNA.

## 2024-02-16 ENCOUNTER — APPOINTMENT (OUTPATIENT)
Dept: CARDIOLOGY | Facility: HOSPITAL | Age: 79
End: 2024-02-16
Payer: MEDICARE

## 2024-02-16 VITALS
TEMPERATURE: 98.6 F | SYSTOLIC BLOOD PRESSURE: 107 MMHG | RESPIRATION RATE: 16 BRPM | HEIGHT: 59 IN | BODY MASS INDEX: 20 KG/M2 | HEART RATE: 67 BPM | WEIGHT: 99.21 LBS | OXYGEN SATURATION: 96 % | DIASTOLIC BLOOD PRESSURE: 71 MMHG

## 2024-02-16 LAB
ANION GAP SERPL CALC-SCNC: 12 MMOL/L (ref 10–20)
BUN SERPL-MCNC: 10 MG/DL (ref 6–23)
CALCIUM SERPL-MCNC: 8.8 MG/DL (ref 8.6–10.3)
CHLORIDE SERPL-SCNC: 104 MMOL/L (ref 98–107)
CO2 SERPL-SCNC: 26 MMOL/L (ref 21–32)
CREAT SERPL-MCNC: 0.54 MG/DL (ref 0.5–1.05)
EGFRCR SERPLBLD CKD-EPI 2021: >90 ML/MIN/1.73M*2
ERYTHROCYTE [DISTWIDTH] IN BLOOD BY AUTOMATED COUNT: 12.4 % (ref 11.5–14.5)
GLUCOSE SERPL-MCNC: 87 MG/DL (ref 74–99)
HCT VFR BLD AUTO: 37.1 % (ref 36–46)
HGB BLD-MCNC: 13 G/DL (ref 12–16)
HOLD SPECIMEN: NORMAL
MCH RBC QN AUTO: 29.7 PG (ref 26–34)
MCHC RBC AUTO-ENTMCNC: 35 G/DL (ref 32–36)
MCV RBC AUTO: 85 FL (ref 80–100)
NRBC BLD-RTO: 0 /100 WBCS (ref 0–0)
PLATELET # BLD AUTO: 225 X10*3/UL (ref 150–450)
POTASSIUM SERPL-SCNC: 3.7 MMOL/L (ref 3.5–5.3)
RBC # BLD AUTO: 4.37 X10*6/UL (ref 4–5.2)
SODIUM SERPL-SCNC: 138 MMOL/L (ref 136–145)
WBC # BLD AUTO: 8 X10*3/UL (ref 4.4–11.3)

## 2024-02-16 PROCEDURE — 82374 ASSAY BLOOD CARBON DIOXIDE: CPT

## 2024-02-16 PROCEDURE — 99231 SBSQ HOSP IP/OBS SF/LOW 25: CPT

## 2024-02-16 PROCEDURE — 93005 ELECTROCARDIOGRAM TRACING: CPT

## 2024-02-16 PROCEDURE — 36415 COLL VENOUS BLD VENIPUNCTURE: CPT

## 2024-02-16 PROCEDURE — 2500000004 HC RX 250 GENERAL PHARMACY W/ HCPCS (ALT 636 FOR OP/ED): Performed by: STUDENT IN AN ORGANIZED HEALTH CARE EDUCATION/TRAINING PROGRAM

## 2024-02-16 PROCEDURE — 82306 VITAMIN D 25 HYDROXY: CPT | Mod: AHULAB | Performed by: INTERNAL MEDICINE

## 2024-02-16 PROCEDURE — 7100000011 HC EXTENDED STAY RECOVERY HOURLY - NURSING UNIT

## 2024-02-16 PROCEDURE — 85027 COMPLETE CBC AUTOMATED: CPT

## 2024-02-16 RX ADMIN — HEPARIN SODIUM 5000 UNITS: 5000 INJECTION INTRAVENOUS; SUBCUTANEOUS at 04:38

## 2024-02-16 RX ADMIN — HEPARIN SODIUM 5000 UNITS: 5000 INJECTION INTRAVENOUS; SUBCUTANEOUS at 10:18

## 2024-02-16 RX ADMIN — POLYETHYLENE GLYCOL 3350 17 G: 17 POWDER, FOR SOLUTION ORAL at 09:34

## 2024-02-16 RX ADMIN — ACETAMINOPHEN 975 MG: 325 TABLET ORAL at 09:34

## 2024-02-16 RX ADMIN — ACETAMINOPHEN 975 MG: 325 TABLET ORAL at 03:01

## 2024-02-16 SDOH — ECONOMIC STABILITY: HOUSING INSECURITY: IN THE LAST 12 MONTHS, HOW MANY PLACES HAVE YOU LIVED?: 1

## 2024-02-16 SDOH — ECONOMIC STABILITY: FOOD INSECURITY: WITHIN THE PAST 12 MONTHS, THE FOOD YOU BOUGHT JUST DIDN'T LAST AND YOU DIDN'T HAVE MONEY TO GET MORE.: NEVER TRUE

## 2024-02-16 SDOH — HEALTH STABILITY: MENTAL HEALTH: HOW MANY STANDARD DRINKS CONTAINING ALCOHOL DO YOU HAVE ON A TYPICAL DAY?: PATIENT DOES NOT DRINK

## 2024-02-16 SDOH — ECONOMIC STABILITY: INCOME INSECURITY: IN THE LAST 12 MONTHS, WAS THERE A TIME WHEN YOU WERE NOT ABLE TO PAY THE MORTGAGE OR RENT ON TIME?: NO

## 2024-02-16 SDOH — SOCIAL STABILITY: SOCIAL INSECURITY: WITHIN THE LAST YEAR, HAVE YOU BEEN AFRAID OF YOUR PARTNER OR EX-PARTNER?: NO

## 2024-02-16 SDOH — SOCIAL STABILITY: SOCIAL INSECURITY: ARE YOU OR HAVE YOU BEEN THREATENED OR ABUSED PHYSICALLY, EMOTIONALLY, OR SEXUALLY BY ANYONE?: NO

## 2024-02-16 SDOH — HEALTH STABILITY: MENTAL HEALTH: HOW OFTEN DO YOU HAVE A DRINK CONTAINING ALCOHOL?: NEVER

## 2024-02-16 SDOH — SOCIAL STABILITY: SOCIAL INSECURITY
WITHIN THE LAST YEAR, HAVE YOU BEEN KICKED, HIT, SLAPPED, OR OTHERWISE PHYSICALLY HURT BY YOUR PARTNER OR EX-PARTNER?: NO

## 2024-02-16 SDOH — SOCIAL STABILITY: SOCIAL INSECURITY
WITHIN THE LAST YEAR, HAVE TO BEEN RAPED OR FORCED TO HAVE ANY KIND OF SEXUAL ACTIVITY BY YOUR PARTNER OR EX-PARTNER?: NO

## 2024-02-16 SDOH — SOCIAL STABILITY: SOCIAL INSECURITY: WERE YOU ABLE TO COMPLETE ALL THE BEHAVIORAL HEALTH SCREENINGS?: YES

## 2024-02-16 SDOH — SOCIAL STABILITY: SOCIAL INSECURITY: HAVE YOU HAD THOUGHTS OF HARMING ANYONE ELSE?: NO

## 2024-02-16 SDOH — ECONOMIC STABILITY: INCOME INSECURITY: IN THE PAST 12 MONTHS, HAS THE ELECTRIC, GAS, OIL, OR WATER COMPANY THREATENED TO SHUT OFF SERVICE IN YOUR HOME?: NO

## 2024-02-16 SDOH — SOCIAL STABILITY: SOCIAL INSECURITY: ARE THERE ANY APPARENT SIGNS OF INJURIES/BEHAVIORS THAT COULD BE RELATED TO ABUSE/NEGLECT?: NO

## 2024-02-16 SDOH — SOCIAL STABILITY: SOCIAL INSECURITY: HAS ANYONE EVER THREATENED TO HURT YOUR FAMILY OR YOUR PETS?: NO

## 2024-02-16 SDOH — HEALTH STABILITY: MENTAL HEALTH: HOW OFTEN DO YOU HAVE 6 OR MORE DRINKS ON ONE OCCASION?: NEVER

## 2024-02-16 SDOH — ECONOMIC STABILITY: TRANSPORTATION INSECURITY
IN THE PAST 12 MONTHS, HAS LACK OF TRANSPORTATION KEPT YOU FROM MEETINGS, WORK, OR FROM GETTING THINGS NEEDED FOR DAILY LIVING?: NO

## 2024-02-16 SDOH — SOCIAL STABILITY: SOCIAL NETWORK: ARE YOU MARRIED, WIDOWED, DIVORCED, SEPARATED, NEVER MARRIED, OR LIVING WITH A PARTNER?: MARRIED

## 2024-02-16 SDOH — ECONOMIC STABILITY: HOUSING INSECURITY
IN THE LAST 12 MONTHS, WAS THERE A TIME WHEN YOU DID NOT HAVE A STEADY PLACE TO SLEEP OR SLEPT IN A SHELTER (INCLUDING NOW)?: NO

## 2024-02-16 SDOH — SOCIAL STABILITY: SOCIAL INSECURITY: WITHIN THE LAST YEAR, HAVE YOU BEEN HUMILIATED OR EMOTIONALLY ABUSED IN OTHER WAYS BY YOUR PARTNER OR EX-PARTNER?: NO

## 2024-02-16 SDOH — SOCIAL STABILITY: SOCIAL INSECURITY: DOES ANYONE TRY TO KEEP YOU FROM HAVING/CONTACTING OTHER FRIENDS OR DOING THINGS OUTSIDE YOUR HOME?: NO

## 2024-02-16 SDOH — SOCIAL STABILITY: SOCIAL INSECURITY: DO YOU FEEL UNSAFE GOING BACK TO THE PLACE WHERE YOU ARE LIVING?: NO

## 2024-02-16 SDOH — SOCIAL STABILITY: SOCIAL INSECURITY: DO YOU FEEL ANYONE HAS EXPLOITED OR TAKEN ADVANTAGE OF YOU FINANCIALLY OR OF YOUR PERSONAL PROPERTY?: NO

## 2024-02-16 SDOH — ECONOMIC STABILITY: INCOME INSECURITY: HOW HARD IS IT FOR YOU TO PAY FOR THE VERY BASICS LIKE FOOD, HOUSING, MEDICAL CARE, AND HEATING?: NOT HARD AT ALL

## 2024-02-16 SDOH — SOCIAL STABILITY: SOCIAL INSECURITY: ABUSE: ADULT

## 2024-02-16 SDOH — ECONOMIC STABILITY: FOOD INSECURITY: WITHIN THE PAST 12 MONTHS, YOU WORRIED THAT YOUR FOOD WOULD RUN OUT BEFORE YOU GOT MONEY TO BUY MORE.: NEVER TRUE

## 2024-02-16 SDOH — ECONOMIC STABILITY: TRANSPORTATION INSECURITY
IN THE PAST 12 MONTHS, HAS THE LACK OF TRANSPORTATION KEPT YOU FROM MEDICAL APPOINTMENTS OR FROM GETTING MEDICATIONS?: NO

## 2024-02-16 ASSESSMENT — COGNITIVE AND FUNCTIONAL STATUS - GENERAL
PATIENT BASELINE BEDBOUND: NO
DAILY ACTIVITIY SCORE: 24
MOBILITY SCORE: 24

## 2024-02-16 ASSESSMENT — LIFESTYLE VARIABLES
AUDIT-C TOTAL SCORE: 0
HOW OFTEN DO YOU HAVE A DRINK CONTAINING ALCOHOL: NEVER
AUDIT-C TOTAL SCORE: 0
SKIP TO QUESTIONS 9-10: 1
HOW MANY STANDARD DRINKS CONTAINING ALCOHOL DO YOU HAVE ON A TYPICAL DAY: PATIENT DOES NOT DRINK
HOW OFTEN DO YOU HAVE 6 OR MORE DRINKS ON ONE OCCASION: NEVER
AUDIT-C TOTAL SCORE: 0
SKIP TO QUESTIONS 9-10: 1

## 2024-02-16 ASSESSMENT — ACTIVITIES OF DAILY LIVING (ADL)
TOILETING: INDEPENDENT
WALKS IN HOME: INDEPENDENT
JUDGMENT_ADEQUATE_SAFELY_COMPLETE_DAILY_ACTIVITIES: YES
ADEQUATE_TO_COMPLETE_ADL: YES
HEARING - LEFT EAR: FUNCTIONAL
HEARING - RIGHT EAR: FUNCTIONAL
LACK_OF_TRANSPORTATION: NO
DRESSING YOURSELF: INDEPENDENT
GROOMING: INDEPENDENT
BATHING: INDEPENDENT
PATIENT'S MEMORY ADEQUATE TO SAFELY COMPLETE DAILY ACTIVITIES?: YES
LACK_OF_TRANSPORTATION: NO

## 2024-02-16 ASSESSMENT — PATIENT HEALTH QUESTIONNAIRE - PHQ9
1. LITTLE INTEREST OR PLEASURE IN DOING THINGS: NOT AT ALL
2. FEELING DOWN, DEPRESSED OR HOPELESS: NOT AT ALL
SUM OF ALL RESPONSES TO PHQ9 QUESTIONS 1 & 2: 0

## 2024-02-16 NOTE — PROGRESS NOTES
02/16/24 1017   Discharge Planning   Living Arrangements Spouse/significant other   Support Systems Spouse/significant other   Assistance Needed none   Type of Residence Private residence   Number of Stairs to Enter Residence 1   Number of Stairs Within Residence 0   Do you have animals or pets at home? No   Home or Post Acute Services None   Patient expects to be discharged to: home   Does the patient need discharge transport arranged? No   Financial Resource Strain   How hard is it for you to pay for the very basics like food, housing, medical care, and heating? Not hard   Housing Stability   In the last 12 months, was there a time when you were not able to pay the mortgage or rent on time? N   In the last 12 months, how many places have you lived? 1   In the last 12 months, was there a time when you did not have a steady place to sleep or slept in a shelter (including now)? N   Transportation Needs   In the past 12 months, has lack of transportation kept you from medical appointments or from getting medications? no   In the past 12 months, has lack of transportation kept you from meetings, work, or from getting things needed for daily living? No     2/16/24 1017  Met with patient at bedside to discuss discharge planning.  Patient lives at home with her  in a house.  She is independent with ADLs and drives at baseline.  She does not use any assistive devices for ambulation.  She plans on returning home at discharge today.  She does not anticipate any discharge needs. She will notify the [facility] should any needs arise.  Rosalva Mayberry RN [facility]

## 2024-02-16 NOTE — PROGRESS NOTES
"Manjula Ríos is a 78 y.o. female on day 0 of admission presenting with Uterovaginal prolapse.    Subjective   Patient had an uneventful night.  Pain is well-tolerated on Tylenol.  She passed her voiding trial this morning postop day #1.  She is ambulating and tolerating p.o.       Objective       Physical Exam    No abdominal tenderness.  No vaginal bleeding.    Last Recorded Vitals  Blood pressure 107/61, pulse 79, temperature 37.3 °C (99.2 °F), resp. rate 15, height 1.486 m (4' 10.5\"), weight 45 kg (99 lb 3.3 oz), SpO2 98 %.  Intake/Output last 3 Shifts:  I/O last 3 completed shifts:  In: 986 (21.9 mL/kg) [P.O.:480; I.V.:506 (11.2 mL/kg)]  Out: 2350 (52.2 mL/kg) [Urine:2300 (1.4 mL/kg/hr); Blood:50]  Weight: 45 kg     Relevant Results                             Assessment/Plan   Principal Problem:    Uterovaginal prolapse  Active Problems:    S/P hysterectomy    78-year-old postop day #1 following vaginal hysterectomy, uterosacral ligament suspension, perineorrhaphy, and cystoscopy for symptomatic pelvic organ prolapse.    1.  The patient is meeting all of her immediate postoperative milestones.    2.  She will restart all home medications now.    3.  We discussed postoperative restrictions including lifting no more than 15 pounds, nothing in the vagina, no bathing or soaking for a total of 4 weeks.    4.  The patient will follow-up in 4 weeks.  She is ready for discharge.    LAUREN Montoya MD       I spent 20 minutes in the professional and overall care of this patient.      Ignacio Montoya MD      "

## 2024-02-16 NOTE — DISCHARGE SUMMARY
Discharge Diagnosis  Uterovaginal prolapse    Issues Requiring Follow-Up  POV    Test Results Pending At Discharge  Pending Labs       Order Current Status    Surgical Pathology Exam In process          Hospital Course:  78 yr old female with uterovaginal prolapse s/p vaginal hysterectomy, bilateral salpingectomy, uterosacral ligament suspension, perineorrhaphy on 2/15/24 with Dr. Montoya. Please see operative report for full details. Patient tolerated the procedure well and recovered briefly in PACU before being transitioned to regular nursing floor. Post-op course was uncomplicated. Diet was advanced as tolerated.  IV medication transitioned to oral as diet advanced. On the day of discharge, the pt was tolerating a diet, pain was controlled on PO pain medication, and they were ambulating and voiding spontaneously. They were discharged 2/16/24 in stable condition with instructions to follow up as outpatient.    Pertinent Physical Exam At Time of Discharge  Please see urology progress note.    Home Medications     Medication List      START taking these medications     acetaminophen 500 mg tablet; Commonly known as: Tylenol; Take 2 tablets   (1,000 mg) by mouth every 6 hours if needed for mild pain (1 - 3).   polyethylene glycol 17 gram packet; Commonly known as: Glycolax,   Miralax; Take 17 g by mouth once daily.   traMADol 50 mg tablet; Commonly known as: Ultram; Take 1 tablet (50 mg)   by mouth every 8 hours if needed for moderate pain (4 - 6).     CONTINUE taking these medications     ascorbic acid 500 mg tablet; Commonly known as: Vitamin C   biotin 10 mg tablet   CALCIUM ORAL   cholecalciferol 50 MCG (2000 UT) tablet; Commonly known as: Vitamin D-3   chromium amino acid chelate 400 mcg tablet   Cinnamon 500 mg capsule; Generic drug: cinnamon   coenzyme Q-10 100 mg capsule   COLLAGEN SKIN RENEWAL ORAL   Daily Multi-Vitamin tablet; Generic drug: multivitamin   DIETARY SUPPLEMENT ORAL   ELDERBERRY FRUIT ORAL    estradiol 0.01 % (0.1 mg/gram) vaginal cream; Commonly known as: Estrace   tiffany prim-linoleic-gamolenic ac 1,000 mg capsule   EVENING PRIMROSE OIL ORAL   FIBER GUMMIES (WITH B-COMPLEX) ORAL   meloxicam 15 mg tablet; Commonly known as: Mobic   Prolia 60 mg/mL syringe; Generic drug: denosumab; INJECT 60MG   SUBCUTANEOUSLY EVERY 6 MONTHS (GIVEN AT MD OFFICE)   Vitamin B-12 1,000 mcg tablet extended release; Generic drug:   cyanocobalamin (vitamin B-12)   zinc gluconate 50 mg tablet       Outpatient Follow-Up  Future Appointments   Date Time Provider Department Center   2/26/2024  1:00 PM INF 01 [facility]   3/11/2024 11:00 AM Ignacio Montoya MD [id number]       [employer] Zavaleta PA-C

## 2024-02-16 NOTE — CARE PLAN
The patient's goals for the shift include Ambulate with assistance    The clinical goals for the shift include Safety, pain mangement    Problem: Pain  Goal: Takes deep breaths with improved pain control throughout the shift  Outcome: Progressing  Goal: Walks with improved pain control throughout the shift  Outcome: Progressing     Problem: Fall/Injury  Goal: Not fall by end of shift  Outcome: Progressing  Goal: Be free from injury by end of the shift  Outcome: Progressing

## 2024-02-16 NOTE — NURSING NOTE

## 2024-02-19 ENCOUNTER — TELEPHONE (OUTPATIENT)
Dept: UROLOGY | Facility: CLINIC | Age: 79
End: 2024-02-19

## 2024-02-19 ENCOUNTER — LAB (OUTPATIENT)
Dept: LAB | Facility: LAB | Age: 79
End: 2024-02-19
Payer: MEDICARE

## 2024-02-19 DIAGNOSIS — N39.41 URGE INCONTINENCE: Primary | ICD-10-CM

## 2024-02-19 LAB — 25(OH)D3 SERPL-MCNC: 80 NG/ML (ref 30–100)

## 2024-02-19 PROCEDURE — 87086 URINE CULTURE/COLONY COUNT: CPT

## 2024-02-20 DIAGNOSIS — M81.8 OTHER OSTEOPOROSIS WITHOUT CURRENT PATHOLOGICAL FRACTURE: ICD-10-CM

## 2024-02-20 LAB
BACTERIA UR CULT: NORMAL
LABORATORY COMMENT REPORT: NORMAL
PATH REPORT.FINAL DX SPEC: NORMAL
PATH REPORT.GROSS SPEC: NORMAL
PATH REPORT.RELEVANT HX SPEC: NORMAL
PATH REPORT.TOTAL CANCER: NORMAL
RESIDENT REVIEW: NORMAL

## 2024-02-20 RX ORDER — EPINEPHRINE 0.3 MG/.3ML
0.3 INJECTION SUBCUTANEOUS EVERY 5 MIN PRN
Status: CANCELLED | OUTPATIENT
Start: 2024-02-20

## 2024-02-20 RX ORDER — DIPHENHYDRAMINE HYDROCHLORIDE 50 MG/ML
50 INJECTION INTRAMUSCULAR; INTRAVENOUS AS NEEDED
Status: CANCELLED | OUTPATIENT
Start: 2024-02-20

## 2024-02-20 RX ORDER — FAMOTIDINE 10 MG/ML
20 INJECTION INTRAVENOUS ONCE AS NEEDED
Status: CANCELLED | OUTPATIENT
Start: 2024-02-20

## 2024-02-20 RX ORDER — ALBUTEROL SULFATE 0.83 MG/ML
3 SOLUTION RESPIRATORY (INHALATION) AS NEEDED
Status: CANCELLED | OUTPATIENT
Start: 2024-02-20

## 2024-02-22 ENCOUNTER — OFFICE VISIT (OUTPATIENT)
Dept: UROLOGY | Facility: CLINIC | Age: 79
End: 2024-02-22
Payer: MEDICARE

## 2024-02-22 VITALS
BODY MASS INDEX: 19.91 KG/M2 | SYSTOLIC BLOOD PRESSURE: 130 MMHG | DIASTOLIC BLOOD PRESSURE: 82 MMHG | WEIGHT: 96.9 LBS | HEART RATE: 63 BPM

## 2024-02-22 DIAGNOSIS — Z09 POSTOP CHECK: ICD-10-CM

## 2024-02-22 DIAGNOSIS — N39.41 URGE INCONTINENCE: Primary | ICD-10-CM

## 2024-02-22 DIAGNOSIS — N81.4 CYSTOCELE WITH UTERINE DESCENSUS: ICD-10-CM

## 2024-02-22 DIAGNOSIS — N81.89 PELVIC FLOOR WEAKNESS: ICD-10-CM

## 2024-02-22 DIAGNOSIS — N39.3 FEMALE STRESS INCONTINENCE: ICD-10-CM

## 2024-02-22 LAB
POC APPEARANCE, URINE: CLEAR
POC BILIRUBIN, URINE: NEGATIVE
POC BLOOD, URINE: NEGATIVE
POC COLOR, URINE: YELLOW
POC GLUCOSE, URINE: NEGATIVE MG/DL
POC KETONES, URINE: NEGATIVE MG/DL
POC LEUKOCYTES, URINE: NEGATIVE
POC NITRITE,URINE: NEGATIVE
POC PH, URINE: 5 PH
POC PROTEIN, URINE: NEGATIVE MG/DL
POC SPECIFIC GRAVITY, URINE: 1.02
POC UROBILINOGEN, URINE: 0.2 EU/DL

## 2024-02-22 PROCEDURE — 87086 URINE CULTURE/COLONY COUNT: CPT | Performed by: OBSTETRICS & GYNECOLOGY

## 2024-02-22 PROCEDURE — 1160F RVW MEDS BY RX/DR IN RCRD: CPT | Performed by: OBSTETRICS & GYNECOLOGY

## 2024-02-22 PROCEDURE — 81003 URINALYSIS AUTO W/O SCOPE: CPT | Mod: QW | Performed by: OBSTETRICS & GYNECOLOGY

## 2024-02-22 PROCEDURE — 1159F MED LIST DOCD IN RCRD: CPT | Performed by: OBSTETRICS & GYNECOLOGY

## 2024-02-22 PROCEDURE — 99024 POSTOP FOLLOW-UP VISIT: CPT | Performed by: OBSTETRICS & GYNECOLOGY

## 2024-02-22 PROCEDURE — 1126F AMNT PAIN NOTED NONE PRSNT: CPT | Performed by: OBSTETRICS & GYNECOLOGY

## 2024-02-22 PROCEDURE — 1036F TOBACCO NON-USER: CPT | Performed by: OBSTETRICS & GYNECOLOGY

## 2024-02-22 RX ORDER — OXYBUTYNIN CHLORIDE 5 MG/1
5 TABLET ORAL 3 TIMES DAILY
Qty: 90 TABLET | Refills: 11 | Status: SHIPPED | OUTPATIENT
Start: 2024-02-22 | End: 2024-03-11

## 2024-02-22 NOTE — PATIENT INSTRUCTIONS
Please follow-up for your appointment 3/11/2024.    Please contact the clinic with any questions or concerns.    [phone]

## 2024-02-22 NOTE — PROGRESS NOTES
Subjective   Patient ID: Manjula Ríos is a 78 y.o. female who presents to discuss her pelvic complaints.  HPI   78-year-old with symptomatic stage II anterior and apical pelvic organ prolapse, mild vaginal atrophy, and pelvic floor weakness having failed pessary management having undergone vaginal hysterectomy with bilateral salpingectomy, uterosacral ligament suspension, and perineorrhaphy 2/15/2024.    The patient denies any vaginal complaints.  She denies any vaginal bulges.  She is having 2-3 regular soft bowel movements daily with the assistance of daily MiraLAX.  She denies any fecal incontinence.    However she has noted worsening urgency and frequency.  She notes episodic urinary leakage associated with and without urgency.  She denies any stress urinary incontinence complaints.  Urinalysis today appears negative.    The patient presents to discuss options for her pelvic organ prolapse. Surgical correction was discussed at length.    She continues to be bothered by her bulge.     She is continuing her vaginal estrogen therapy. She denies any vaginal discharge concerns at this time.     She denies any bowel related complaints, no fecal or flatal incontinence.     She has no other complaints.    From Previous note  78-year-old with symptomatic stage II anterior and apical pelvic organ prolapse, mild vaginal atrophy, and pelvic floor weakness having been fitted with a #4 ring with support and knob 5/16/2023 with persistent vaginal versus urinary leakage concerns having had her pessary removed 8/1/2023.    The patient has noted episodic urinary urgency with her first void of the day.  She notes daytime frequency roughly every 3 hours.  She denies any stress urinary incontinence.  She denies any significant vaginal bulge complaints.  She is utilizing her vaginal estrogen therapy.  She denies any bowel related complaints.    She has no other complaints.    From Previous note  77-year-old with symptomatic stage  II anterior and apical pelvic organ prolapse, mild vaginal atrophy, and pelvic floor weakness having been fitted with a #4 ring with support and knob 5/16/2023 with persistent vaginal versus urinary leakage concerns having had her pessary removed 8/1/2023 presenting for follow up.     The patient appears to be having excellent results without the pessary removed. She denies any abnormal vaginal discharge or bleeding. She only notes a vaginal bulge when doing jumping jacks or with a heavy cough. However this is easily reduced and this is not bothersome to her at this time.     She denies any worsening urinary urgency or frequency. She does have to rush to bathroom for her first void in the morning but that is not bothersome. She denies any episodes of nocturia or enuresis. She denies any UTI like symptoms.      She is continuing her vaginal estrogen therapy. She denies any vaginal discharge concerns at this time.     She denies any bowel related complaints, no fecal or flatal incontinence.     She has no other complaints.     From previous note   77-year-old with symptomatic stage II anterior and apical pelvic organ prolapse, mild vaginal atrophy, and pelvic floor weakness having been fitted with a #4 ring with support and knob 5/16/2023 with persistent vaginal versus urinary leakage concerns.     The patient reports increasing urinary urgency and frequency since the last visit. She states that she attempted to remove the pessary and noticed that she had increased leaking since then.      She denies any vaginal spotting. She states that she has been compliant with the topical estrogen and Trimo-Cornejo.      She denies constipation or any bowel-related symptoms.     She has no other complaints.      From previous note This visit was performed through telemedicine  77-year-old with symptomatic stage II anterior and apical pelvic organ prolapse, mild vaginal atrophy, and pelvic floor weakness having been fitted with a #4  "ring with support and knob 5/16/2023 with persistent vaginal versus urinary leakage concerns.     Reports resolution of her malodorous urine s/p Trimo-Cornejo. Complains of persistent \"leakage\" that is vaginal vs urinary in nature, but she is not definitely sure, described as being more of a sensation. Last UDS was in June 2023. Reviewed UDS results again today, including being negative for leakage. Discussed option of removing her pessary at home vs in-office. She will schedule an appointment to have this removed.      She has no other complaints     From previous note  77-year-old with symptomatic stage II anterior and apical pelvic organ prolapse, mild vaginal atrophy, and pelvic floor weakness having been fitted with a #4 ring with support and knob 5/16/2023 with persistent urinary incontinence complaints.     The patient continues to note \"leakage\". She is wearing a pad and notes a \"yellowish\" leakage. She has proceeded with a phenazopyridine challenge and felt that this was bright orange in color and urine in nature. We discussed at length today her lower urinary tract symptoms. She denies any urgency or frequency. She notes urinating roughly every 4-5 hours during the daytime and notes 1 episode of nocturia. She denies any stress urinary incontinence. She is overall very satisfied with her pessary noting complete resolution of her vaginal bulge complaints. She is utilizing her vaginal estrogen therapy.     We discussed at length today my concerns that her discharge is vaginal in nature as opposed to urine.     She has no other complaints.     From previous note  This visit was performed through telemedicine  77-year-old with symptomatic stage II anterior and apical pelvic organ prolapse, mild vaginal atrophy, and pelvic floor weakness having been fitted with a #4 ring with support and knob 5/16/2023.     The patient was utilizing the Oxytrol patch but noted severe fatigue and pain in her lower extremities. She " states her symptoms resolved since she stopped utilizing it. It was noted that she has yellow urine on her pad with her phenazopyridine challenge. She denies any urgency but continues to leak on movement and laughing, coughing and sneezing. Bladder sling Surgery was discussed at length.      She denies any vaginal complaints, no abnormal vaginal bleeding or discharge.     She denies any bowel related complaints, no fecal or flatal incontinence.     She has no other complaints.     From previous note  This visit was performed through telemedicine  77-year-old with symptomatic stage II anterior and apical pelvic organ prolapse, mild vaginal atrophy, and pelvic floor weakness having been fitted with a #4 ring with support and knob 5/16/2023.     The patient presents to discuss her UDS test results, which demonstrates no filling defect, no bladder spasm, mild sensitivity, no obstruction and adequate emptying. The test does not demonstrate any signs of stress incontinence. she voids 4-5 times during the day but leaks in between and utilizes pads throughout the day, as she leaks with movement and at the sound of water. She denies any episodes of nocturia , however her pad is moist when she wakes up. She denies any UTI like symptoms.      She denies any vaginal complaints, no abnormal vaginal bleeding or discharge.     She denies any bowel related complaints, no fecal or flatal incontinence.     She has no other complaints.     From previous note  77-year-old with symptomatic stage II anterior and apical pelvic organ prolapse, mild vaginal atrophy, and pelvic floor weakness having been fitted with a #4 ring with support and knob 5/16/2023.     The patient reports of noting moderate incontinence after the pessary placed followed by a gush on sneezing. She voids 3-4 times during the day but leaks in between and utilizes pads throughout the day, as she leaks with movement. She denies any episodes of nocturia , however her pad  is moist when she wakes up. She denies any UTI like symptoms.      She does note near resolution of her bulge complaints with the pessary in place, however she is bothered by her lower urinary tract complaints. She denies any abnormal vaginal bleeding or discharge. She is utilizing the vaginal estrogen cream.     She denies any bowel related complaints, no fecal or flatal incontinence.     She has no other complaints.     From previous note  77-year-old with symptomatic stage II anterior and apical pelvic organ prolapse, mild vaginal atrophy, and pelvic floor weakness having been fitted with a #4 ring with support 4/25/2023.     The patient is satisfied from the pessary standpoint. She denies any vaginal complaints, no abnormal vaginal bleeding or discharge. She is utilizing the vaginal estrogen cream.     She complaints on leaking when she stands from sitting position. She does note leaking on laughing, cough or sneezing. She is noting 2-3 episodes of nocturia with urgency.     She denies any bowel related complaints, she did feel the pessary was pushing on her bowels but no fecal or flatal incontinence.     She has no other complaints.        From previous note  77-year-old with symptomatic stage II anterior and apical pelvic organ prolapse, mild vaginal atrophy, and pelvic floor weakness presenting for pessary fitting today 04/25/23.     She is utilizing the vaginal estrogen cream.      She has no other complaints.           From previous note  77- year-old patient presenting as a referral from Dr. Isbell with complaints of pelvic organ prolapse.     The patient states she noticed a vaginal bulge last night in the shower, she denies any pressure or pulling sensation. She also denies any vaginal bleeding or discharge. She is sexually active and denies any vaginal complaints. She was previously seen by Dr. Isbell and Dr. Sepulveda for he gynecological care.     She also note urinary urgency and incontinence. She  denies leaking on laughing, cough or sneezing. She denies any nocturia but denies any enuresis. She voids every hour during the day. She denies any history of nephrolithiasis, gross hematuria or chronic recurrent UTIs.      She complaints of constipation but has a bowel movement every morning, she has utilized MiraLAX in the past. She denies any fecal or flatal incontinence.      She has no other complaints.  Review of Systems    Objective   Physical Exam  Constitutional: No fever, No chills and No fatigue.   Eyes: No vision problems and No dryness of the eyes.   ENT: No dry mouth, No hearing loss and No nosebleeds.   Cardiovascular: No chest pain, No palpitations and No orthopnea.   Respiratory: No shortness of breath, No cough and No wheezing.   Gastrointestinal: No abdominal pain, No constipation, No nausea, No diarrhea, No vomiting and No melena.   Genitourinary: As noted in HPI.   Musculoskeletal: No back pain, No myalgias, No muscle weakness, No joint swelling and No leg edema.   Integumentary: No rashes, No skin lesion and No itching.   Neurological: No headache, No numbness and No dizziness.   Psychiatric: No sleep disturbances, No anxiety and No depression.   Endocrine: No hot flashes, No loss of hair and No hirsutism.   Hematologic/Lymphatic: No swollen glands, No tendency for easy bleeding and No tendency for easy bruising.   All other systems have been reviewed and are negative for complaint.       PHYSICAL EXAMINATION:  No LMP recorded (lmp unknown).  There is no height or weight on file to calculate BMI.  LMP  (LMP Unknown)   General Appearance: well appearing  Neuro: Alert and oriented   HEENT: mucous membranes moist, neck supple  Resp: No respiratory distress, normal work of breathing  MSK: normal range of motion, gait appropriate         Assessment/Plan     78-year-old with symptomatic stage II anterior and apical pelvic organ prolapse, mild vaginal atrophy, and pelvic floor weakness having failed  pessary management having undergone vaginal hysterectomy with bilateral salpingectomy, uterosacral ligament suspension, and perineorrhaphy 2/15/2024.     #1 the patient appears meeting all of her immediate postoperative milestones.  She denies any vaginal complaints.  However she has noted some worsening urinary urgency and frequency.  She is unable to swallow pills and we discussed proceeding with immediate action oxybutynin therapy now.  We discussed the potential side effects of this medication and stopping immediately should she have any bothersome issues.  Pending urine culture.    2. We have previously discussed the patient's vaginal atrophy. She will continue her vaginal estrogen therapy 3 times a week after she heals from her surgery.     3. We have previously discussed the patient's mild pelvic floor weakness.  We also discussed her episodic urinary urgency and frequency.  We again discussed the [facility] care pathway including fluid management strategies, pelvic floor physical therapy, and the possibility of beta 3 agonist therapy.  She has not had the opportunity to follow-up with pelvic floor physical therapy.  As above, the patient will proceed with immediate action oxybutynin therapy now.     4. The patient will follow-up in 2 weeks for postoperative exam.  She will contact the clinic should she have any further issues.     LAUREN Montoya MD

## 2024-02-23 LAB — BACTERIA UR CULT: NORMAL

## 2024-02-26 ENCOUNTER — INFUSION (OUTPATIENT)
Dept: INFUSION THERAPY | Facility: CLINIC | Age: 79
End: 2024-02-26
Payer: MEDICARE

## 2024-02-26 VITALS
BODY MASS INDEX: 19.56 KG/M2 | SYSTOLIC BLOOD PRESSURE: 126 MMHG | HEART RATE: 58 BPM | WEIGHT: 97 LBS | HEIGHT: 59 IN | DIASTOLIC BLOOD PRESSURE: 76 MMHG | OXYGEN SATURATION: 98 % | TEMPERATURE: 98.7 F

## 2024-02-26 DIAGNOSIS — M81.8 OTHER OSTEOPOROSIS WITHOUT CURRENT PATHOLOGICAL FRACTURE: ICD-10-CM

## 2024-02-26 PROCEDURE — 96372 THER/PROPH/DIAG INJ SC/IM: CPT | Performed by: NURSE PRACTITIONER

## 2024-02-26 RX ORDER — FAMOTIDINE 10 MG/ML
20 INJECTION INTRAVENOUS ONCE AS NEEDED
OUTPATIENT
Start: 2024-08-24

## 2024-02-26 RX ORDER — EPINEPHRINE 0.3 MG/.3ML
0.3 INJECTION SUBCUTANEOUS EVERY 5 MIN PRN
OUTPATIENT
Start: 2024-08-24

## 2024-02-26 RX ORDER — DIPHENHYDRAMINE HYDROCHLORIDE 50 MG/ML
50 INJECTION INTRAMUSCULAR; INTRAVENOUS AS NEEDED
OUTPATIENT
Start: 2024-08-24

## 2024-02-26 RX ORDER — ALBUTEROL SULFATE 0.83 MG/ML
3 SOLUTION RESPIRATORY (INHALATION) AS NEEDED
OUTPATIENT
Start: 2024-08-24

## 2024-02-26 ASSESSMENT — ENCOUNTER SYMPTOMS
EXTREMITY WEAKNESS: 0
DYSURIA: 0
LIGHT-HEADEDNESS: 0
DIARRHEA: 0
CONSTIPATION: 0
EYE PROBLEMS: 1
DEPRESSION: 0
NUMBNESS: 0
FATIGUE: 1
MYALGIAS: 0
WOUND: 0
FEVER: 0
SHORTNESS OF BREATH: 0
APPETITE CHANGE: 0
HEADACHES: 0
CHILLS: 0
PALPITATIONS: 0
HEMATURIA: 0
SLEEP DISTURBANCE: 0
TROUBLE SWALLOWING: 0
WHEEZING: 0
FREQUENCY: 0
LEG SWELLING: 0
UNEXPECTED WEIGHT CHANGE: 0
VOMITING: 0
DIZZINESS: 0
COUGH: 0
SORE THROAT: 0
ARTHRALGIAS: 0
NAUSEA: 0
NERVOUS/ANXIOUS: 0
VOICE CHANGE: 0
CONFUSION: 0
ABDOMINAL PAIN: 0
BLOOD IN STOOL: 0
BRUISES/BLEEDS EASILY: 0

## 2024-02-26 NOTE — PATIENT INSTRUCTIONS
Today :We administered denosumab. Prolia 60mg subcutaneous injection left upper arm  Blood Calcium within 28 days of next Prolia injection    For:   1. Other osteoporosis without current pathological fracture       Your next appointment is due in:  6 months     Please read the  Medication Guide that was given to you and reviewed.     (Tell all doctors including dentists that you are taking this medication)     Go to the emergency room or call 911 if:  -You have signs of allergic reaction:   -Rash, hives, itching.   -Swollen, blistered, peeling skin.   -Swelling of face, lips, mouth, tongue or throat.   -Tightness of chest, trouble breathing, swallowing or talking     Call your doctor:  - If injection site gets red, warm, swollen, itchy or leaks fluid or pus.     (Leave band aid on your injection site for at least 2 hours and keep area clean and dry  - If you get sick or have symptoms of infection or are not feeling well for any reason.    (Wash your hands often, stay away from people who are sick)  - If you have side effects from your medication that do not go away or are bothersome.     (Refer to the teaching your nurse gave you for side effects to call your doctor about)    - Common side effects may include:  stuffy nose, headache, feeling tired, muscle aches, upset stomach  - Before receiving any vaccines     - Call the [facility] at   If:  - You get sick, are on antibiotics, have had a recent vaccine, have surgery or dental work and your doctor wants your visit rescheduled.  - You need to cancel and reschedule your visit for any reason. Call at least 2 days before your visit if you need to cancel.   - Your insurance changes before your next visit.    (We will need to get approval from your new insurance. This can take up to two weeks.)     The [facility] is opened Monday thru Friday. We are closed on weekends and holidays.   Voice mail will take your call if the center is  closed. If you leave a message please allow 24 hours for a call back during weekdays. If you leave a message on a weekend/holiday, we will call you back the next business day.               No

## 2024-02-26 NOTE — PROGRESS NOTES
"[facility]   infusion Clinic Note   Date: 2024   Name: Manjula Ríos  : 1945   MRN: [id number]         Reason for Visit: Injections (Every 6 month Prolia 60mg subcutaneous injection)         Visit Type: INJECTION       Ordered By:  Leticia Barney MD      Accompanied by:Self      Diagnosis: Other osteoporosis without current pathological fracture       Allergies:   Allergies as of 2024 - Reviewed 2024   Allergen Reaction Noted    Cat dander Shortness of breath 2018    House dust Shortness of breath 2018    Niacin Syncope and Bleeding 2024    Hay fever and allergy relief Other 2022    Naproxen GI bleeding and Other 2020    Rosuvastatin Other 2023         Current Medications has a current medication list which includes the following prescription(s): acetaminophen, ascorbic acid, ascorbic acid/collagen hydr, biotin, calcium, cholecalciferol, chromium amino acid chelate, cinnamon, coenzyme q-10, cyanocobalamin (vitamin b-12), denosumab, denosumab, dietary supplement, elderberry fruit, estradiol, tiffany prim-linoleic-gamolenic ac, evening primrose oil, meloxicam, daily multi-vitamin, oxybutynin, polydextrose/vitamin b complex, polyethylene glycol, tramadol, and zinc gluconate.       Vitals:   Vitals:    24 1307   BP: 126/76   Pulse: 58   Temp: 37.1 °C (98.7 °F)   SpO2: 98%   Weight: (!) 44 kg (97 lb)   Height: 1.486 m (4' 10.5\")             Infusion Pre-procedure Checklist:   - Allergies reviewed: yes   - Medications reviewed: yes       - Previous reaction to current treatment: no      Assess patient for the concerns below. Document provider notification as appropriate.  - Active or recent infection with/without current antibiotic use: no  - Recent or planned invasive dental work: no  - Recent or planned surgeries: yes 2-, vaginal hysterectomy/cysto  - Recently received or plans to receive vaccinations: no  - Has " treatment related toxicities: no  - Is pregnant:  no      Pain: 0   - Is the pain different from normal: n/a   - Is the pain tolerable: n/a   - Is your Doctor aware:  n/a               Fall Risk Screening: Ro Fall Risk  History of Falling, Immediate or Within 3 Months: No  Ambulatory Aid: Walks without aid/bedrest/nurse assist  Intravenous Therapy/Heparin Lock: No  Gait/Transferring: Normal/bedrest/immobile  Mental Status: Oriented to own ability       Review Of Systems:  Review of Systems   Constitutional:  Positive for fatigue. Negative for appetite change, chills, fever and unexpected weight change.   HENT:   Negative for hearing loss, mouth sores, sore throat, tinnitus, trouble swallowing and voice change.    Eyes:  Positive for eye problems.        Seeing eye doctor on Thursday   Respiratory:  Negative for cough, shortness of breath and wheezing.    Cardiovascular:  Negative for chest pain, leg swelling and palpitations.   Gastrointestinal:  Negative for abdominal pain, blood in stool, constipation, diarrhea, nausea and vomiting.   Genitourinary:  Negative for dysuria, frequency and hematuria.         Bladder leakage since surgery on 2-  Seeing provider tomorrow 2-   Musculoskeletal:  Negative for arthralgias and myalgias.   Skin:  Negative for itching, rash and wound.   Neurological:  Negative for dizziness, extremity weakness, headaches, light-headedness and numbness.   Hematological:  Does not bruise/bleed easily.   Psychiatric/Behavioral:  Negative for confusion, depression and sleep disturbance. The patient is not nervous/anxious.          Infusion Readiness:   - Assessment Concerns Related to Infusion: No  - Provider notified: n/a      Document Below Only If Indicated:   New Patient Education:    N/A (returning patient for continuation of therapy. Ongoing education provided as needed.)        Treatment Conditions & Drug Specific Questions:    Denosumab  (PROLIA. XGEVA)    (Unless  otherwise specified on patient specific therapy plan):     TREATMENT CONDITIONS:  Unless otherwise specified on patient specific therapy plan HOLD and notify provider prior to proceeding with today's injection if patients:  o Calcium is LESS THAN 8.6 mg/dL OR  Ionized Calcium LESS THAN 1.1 mmol/L  o Recent or planned invasive dental procedure (within 4 weeks)    Lab Results   Component Value Date    CALCIUM 8.8 02/16/2024    PHOS 3.8 12/06/2018      Labs reviewed and patient meets treatment conditions? Yes    DRUG SPECIFIC QUESTIONS:  Is the patient taking calcium and vitamin D? Yes  (Recommended)    Pt Instructed on following risks: (1) hypocalcemia, (2) osteonecrosis of the jaw, (3) atypical femoral fractures, (4) serious infections, and (5) dermatologic reactions?  Yes      REMINDER:  PREGNANCY CATEGORY X DRUG. OBTAIN NEGTATIVE PREGNANCY TEST PRIOR TO FIRST INFUSION FOR WOMEN OF CHILDBEARING ABILITY   REMS DRUG    Recommended Vitals/Observation:  Vitals: Obtain vitals prior to injection.  Observation: Patient may leave immediately following injection.        Weight Based Drug Calculations:    WEIGHT BASED DRUGS: NOT APPLICABLE / FLAT DOSE          Initiated By: Mary Elias LPN   Time: 1:45 PM     We administered denosumab.

## 2024-02-27 ENCOUNTER — OFFICE VISIT (OUTPATIENT)
Dept: UROLOGY | Facility: CLINIC | Age: 79
End: 2024-02-27
Payer: MEDICARE

## 2024-02-27 VITALS — BODY MASS INDEX: 20.24 KG/M2 | WEIGHT: 98.5 LBS

## 2024-02-27 DIAGNOSIS — N39.3 FEMALE STRESS INCONTINENCE: ICD-10-CM

## 2024-02-27 DIAGNOSIS — N81.89 PELVIC FLOOR WEAKNESS: Primary | ICD-10-CM

## 2024-02-27 DIAGNOSIS — N81.4 CYSTOCELE WITH UTERINE DESCENSUS: ICD-10-CM

## 2024-02-27 DIAGNOSIS — Z09 POSTOP CHECK: ICD-10-CM

## 2024-02-27 DIAGNOSIS — N39.41 URGE INCONTINENCE: ICD-10-CM

## 2024-02-27 LAB
POC APPEARANCE, URINE: ABNORMAL
POC BILIRUBIN, URINE: NEGATIVE
POC BLOOD, URINE: NEGATIVE
POC COLOR, URINE: YELLOW
POC GLUCOSE, URINE: NEGATIVE MG/DL
POC KETONES, URINE: NEGATIVE MG/DL
POC LEUKOCYTES, URINE: ABNORMAL
POC NITRITE,URINE: NEGATIVE
POC PH, URINE: 7 PH
POC PROTEIN, URINE: NEGATIVE MG/DL
POC SPECIFIC GRAVITY, URINE: 1.02
POC UROBILINOGEN, URINE: 0.2 EU/DL

## 2024-02-27 PROCEDURE — 1159F MED LIST DOCD IN RCRD: CPT | Performed by: OBSTETRICS & GYNECOLOGY

## 2024-02-27 PROCEDURE — 1160F RVW MEDS BY RX/DR IN RCRD: CPT | Performed by: OBSTETRICS & GYNECOLOGY

## 2024-02-27 PROCEDURE — 81003 URINALYSIS AUTO W/O SCOPE: CPT | Mod: QW | Performed by: OBSTETRICS & GYNECOLOGY

## 2024-02-27 PROCEDURE — 1126F AMNT PAIN NOTED NONE PRSNT: CPT | Performed by: OBSTETRICS & GYNECOLOGY

## 2024-02-27 PROCEDURE — 1036F TOBACCO NON-USER: CPT | Performed by: OBSTETRICS & GYNECOLOGY

## 2024-02-27 PROCEDURE — 99024 POSTOP FOLLOW-UP VISIT: CPT | Performed by: OBSTETRICS & GYNECOLOGY

## 2024-02-27 NOTE — PROGRESS NOTES
Subjective   Patient ID: Manjula Ríos is a 78 y.o. female who presents to discuss her pelvic complaints.  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     The patient  presents with worsening urinary incontinence. She utilized Oxybutynin but noted terrible dry mouth and throat complains. She also complained of  dizziness. She appears to have spontaneous incontinence without even the sense of urge.  This appears to be stress-induced with movement.  She denies any regular episodes of nocturia but does wake up to empty her bladder. She voids every 2-3 hours and yet has episodes of incontinence. UA shows small leukocytes. PVR was 0.    She denies any bowel related complaints, no fecal or flatal incontinence.    She denies any vaginal complaints, no abnormal bleeding or discharge. She appears to be doing well from the surgery standpoint, she states that her pain is controlled.     She has no other complaints.    From Previous note   78-year-old with symptomatic stage II anterior and apical pelvic organ prolapse, mild vaginal atrophy, and pelvic floor weakness having failed pessary management having undergone vaginal hysterectomy with bilateral salpingectomy, uterosacral ligament suspension, and perineorrhaphy 2/15/2024.    The patient denies any vaginal complaints.  She denies any vaginal bulges.  She is having 2-3 regular soft bowel movements daily with the assistance of daily MiraLAX.  She denies any fecal incontinence.    However she has noted worsening urgency and frequency.  She notes episodic urinary leakage associated with and without urgency.  She denies any stress urinary incontinence complaints.  Urinalysis today appears negative.    The patient presents to  discuss options for her pelvic organ prolapse. Surgical correction was discussed at length.    She continues to be bothered by her bulge.     She is continuing her vaginal estrogen therapy. She denies any vaginal discharge concerns at this time.     She denies any bowel related complaints, no fecal or flatal incontinence.     She has no other complaints.    From Previous note  78-year-old with symptomatic stage II anterior and apical pelvic organ prolapse, mild vaginal atrophy, and pelvic floor weakness having been fitted with a #4 ring with support and knob 5/16/2023 with persistent vaginal versus urinary leakage concerns having had her pessary removed 8/1/2023.    The patient has noted episodic urinary urgency with her first void of the day.  She notes daytime frequency roughly every 3 hours.  She denies any stress urinary incontinence.  She denies any significant vaginal bulge complaints.  She is utilizing her vaginal estrogen therapy.  She denies any bowel related complaints.    She has no other complaints.    From Previous note  77-year-old with symptomatic stage II anterior and apical pelvic organ prolapse, mild vaginal atrophy, and pelvic floor weakness having been fitted with a #4 ring with support and knob 5/16/2023 with persistent vaginal versus urinary leakage concerns having had her pessary removed 8/1/2023 presenting for follow up.     The patient appears to be having excellent results without the pessary removed. She denies any abnormal vaginal discharge or bleeding. She only notes a vaginal bulge when doing jumping jacks or with a heavy cough. However this is easily reduced and this is not bothersome to her at this time.     She denies any worsening urinary urgency or frequency. She does have to rush to bathroom for her first void in the morning but that is not bothersome. She denies any episodes of nocturia or enuresis. She denies any UTI like symptoms.      She is continuing her vaginal estrogen  "therapy. She denies any vaginal discharge concerns at this time.     She denies any bowel related complaints, no fecal or flatal incontinence.     She has no other complaints.     From previous note   77-year-old with symptomatic stage II anterior and apical pelvic organ prolapse, mild vaginal atrophy, and pelvic floor weakness having been fitted with a #4 ring with support and knob 5/16/2023 with persistent vaginal versus urinary leakage concerns.     The patient reports increasing urinary urgency and frequency since the last visit. She states that she attempted to remove the pessary and noticed that she had increased leaking since then.      She denies any vaginal spotting. She states that she has been compliant with the topical estrogen and Trimo-Cornejo.      She denies constipation or any bowel-related symptoms.     She has no other complaints.      From previous note This visit was performed through telemedicine  77-year-old with symptomatic stage II anterior and apical pelvic organ prolapse, mild vaginal atrophy, and pelvic floor weakness having been fitted with a #4 ring with support and knob 5/16/2023 with persistent vaginal versus urinary leakage concerns.     Reports resolution of her malodorous urine s/p Trimo-Cornejo. Complains of persistent \"leakage\" that is vaginal vs urinary in nature, but she is not definitely sure, described as being more of a sensation. Last UDS was in June 2023. Reviewed UDS results again today, including being negative for leakage. Discussed option of removing her pessary at home vs in-office. She will schedule an appointment to have this removed.      She has no other complaints     From previous note  77-year-old with symptomatic stage II anterior and apical pelvic organ prolapse, mild vaginal atrophy, and pelvic floor weakness having been fitted with a #4 ring with support and knob 5/16/2023 with persistent urinary incontinence complaints.     The patient continues to note \"leakage\". " "She is wearing a pad and notes a \"yellowish\" leakage. She has proceeded with a phenazopyridine challenge and felt that this was bright orange in color and urine in nature. We discussed at length today her lower urinary tract symptoms. She denies any urgency or frequency. She notes urinating roughly every 4-5 hours during the daytime and notes 1 episode of nocturia. She denies any stress urinary incontinence. She is overall very satisfied with her pessary noting complete resolution of her vaginal bulge complaints. She is utilizing her vaginal estrogen therapy.     We discussed at length today my concerns that her discharge is vaginal in nature as opposed to urine.     She has no other complaints.     From previous note  This visit was performed through telemedicine  77-year-old with symptomatic stage II anterior and apical pelvic organ prolapse, mild vaginal atrophy, and pelvic floor weakness having been fitted with a #4 ring with support and knob 5/16/2023.     The patient was utilizing the Oxytrol patch but noted severe fatigue and pain in her lower extremities. She states her symptoms resolved since she stopped utilizing it. It was noted that she has yellow urine on her pad with her phenazopyridine challenge. She denies any urgency but continues to leak on movement and laughing, coughing and sneezing. Bladder sling Surgery was discussed at length.      She denies any vaginal complaints, no abnormal vaginal bleeding or discharge.     She denies any bowel related complaints, no fecal or flatal incontinence.     She has no other complaints.     From previous note  This visit was performed through telemedicine  77-year-old with symptomatic stage II anterior and apical pelvic organ prolapse, mild vaginal atrophy, and pelvic floor weakness having been fitted with a #4 ring with support and knob 5/16/2023.     The patient presents to discuss her UDS test results, which demonstrates no filling defect, no bladder spasm, " mild sensitivity, no obstruction and adequate emptying. The test does not demonstrate any signs of stress incontinence. she voids 4-5 times during the day but leaks in between and utilizes pads throughout the day, as she leaks with movement and at the sound of water. She denies any episodes of nocturia , however her pad is moist when she wakes up. She denies any UTI like symptoms.      She denies any vaginal complaints, no abnormal vaginal bleeding or discharge.     She denies any bowel related complaints, no fecal or flatal incontinence.     She has no other complaints.     From previous note  77-year-old with symptomatic stage II anterior and apical pelvic organ prolapse, mild vaginal atrophy, and pelvic floor weakness having been fitted with a #4 ring with support and knob 5/16/2023.     The patient reports of noting moderate incontinence after the pessary placed followed by a gush on sneezing. She voids 3-4 times during the day but leaks in between and utilizes pads throughout the day, as she leaks with movement. She denies any episodes of nocturia , however her pad is moist when she wakes up. She denies any UTI like symptoms.      She does note near resolution of her bulge complaints with the pessary in place, however she is bothered by her lower urinary tract complaints. She denies any abnormal vaginal bleeding or discharge. She is utilizing the vaginal estrogen cream.     She denies any bowel related complaints, no fecal or flatal incontinence.     She has no other complaints.     From previous note  77-year-old with symptomatic stage II anterior and apical pelvic organ prolapse, mild vaginal atrophy, and pelvic floor weakness having been fitted with a #4 ring with support 4/25/2023.     The patient is satisfied from the pessary standpoint. She denies any vaginal complaints, no abnormal vaginal bleeding or discharge. She is utilizing the vaginal estrogen cream.     She complaints on leaking when she stands  from sitting position. She does note leaking on laughing, cough or sneezing. She is noting 2-3 episodes of nocturia with urgency.     She denies any bowel related complaints, she did feel the pessary was pushing on her bowels but no fecal or flatal incontinence.     She has no other complaints.        From previous note  77-year-old with symptomatic stage II anterior and apical pelvic organ prolapse, mild vaginal atrophy, and pelvic floor weakness presenting for pessary fitting today 04/25/23.     She is utilizing the vaginal estrogen cream.      She has no other complaints.           From previous note  77- year-old patient presenting as a referral from Dr. Isbell with complaints of pelvic organ prolapse.     The patient states she noticed a vaginal bulge last night in the shower, she denies any pressure or pulling sensation. She also denies any vaginal bleeding or discharge. She is sexually active and denies any vaginal complaints. She was previously seen by Dr. Isbell and Dr. Sepulveda for he gynecological care.     She also note urinary urgency and incontinence. She denies leaking on laughing, cough or sneezing. She denies any nocturia but denies any enuresis. She voids every hour during the day. She denies any history of nephrolithiasis, gross hematuria or chronic recurrent UTIs.      She complaints of constipation but has a bowel movement every morning, she has utilized MiraLAX in the past. She denies any fecal or flatal incontinence.      She has no other complaints.  Review of Systems    Objective   Physical Exam  Constitutional: No fever, No chills and No fatigue.   Eyes: No vision problems and No dryness of the eyes.   ENT: No dry mouth, No hearing loss and No nosebleeds.   Cardiovascular: No chest pain, No palpitations and No orthopnea.   Respiratory: No shortness of breath, No cough and No wheezing.   Gastrointestinal: No abdominal pain, No constipation, No nausea, No diarrhea, No vomiting and No melena.    Genitourinary: As noted in HPI.   Musculoskeletal: No back pain, No myalgias, No muscle weakness, No joint swelling and No leg edema.   Integumentary: No rashes, No skin lesion and No itching.   Neurological: No headache, No numbness and No dizziness.   Psychiatric: No sleep disturbances, No anxiety and No depression.   Endocrine: No hot flashes, No loss of hair and No hirsutism.   Hematologic/Lymphatic: No swollen glands, No tendency for easy bleeding and No tendency for easy bruising.   All other systems have been reviewed and are negative for complaint.       PHYSICAL EXAMINATION:  No LMP recorded (lmp unknown).  There is no height or weight on file to calculate BMI.  LMP  (LMP Unknown)   General Appearance: well appearing  Neuro: Alert and oriented   HEENT: mucous membranes moist, neck supple  Resp: No respiratory distress, normal work of breathing  MSK: normal range of motion, gait appropriate    Pelvic:  Genitourinary:  normal external genitalia, Bartholin's glands negative, [place]'s glands negative  Urethra   normal meatus, non-tender, no periurethral mass  Vaginal mucosa atrophic, cuff is closed with PDS sutures intact.  There does not appear to be undue tension on the anterior vaginal wall.  Cervix surgically absent  Uterus surgically absent  Adnexae  negative nontender, no masses  Atrophy positive    CST negative  Pelvic floor muscle contraction  0/5    POP-Q (in supine position):       Aa -3     Ba -3     C -9              gh 3     pb 4     tvl 9              Ap -3     Bp -3     D     Rectal: no hemorrhoids, fissures or masses           Assessment/Plan     78-year-old with symptomatic stage II anterior and apical pelvic organ prolapse, mild vaginal atrophy, and pelvic floor weakness having failed pessary management having undergone vaginal hysterectomy with bilateral salpingectomy, uterosacral ligament suspension, and perineorrhaphy 2/15/2024 with what appears to be occult stress urinary incontinence  postoperative complaints.     #1 patient is meeting all of her immediate postoperative milestones.  She is healing appropriately from a vaginal standpoint.  We again discussed not lifting any more than 15 pounds, nothing in the vagina, no bathing or soaking for total of 4 weeks.    2. We have previously discussed the patient's vaginal atrophy. She will continue her vaginal estrogen therapy 3 times a week.  She will restart this now.     3. We have previously discussed the patient's mild pelvic floor weakness.  We also discussed her episodic urinary urgency and frequency.  We discussed her acute worsening incontinence complaints.  This appears to be stress urinary incontinence by history.  CST today 2/27/2024 was negative.  She is emptying appropriately.  She had significant side effects associated with oxybutynin and since stopped this medication.  We discussed proceeding with pelvic floor physical therapy in 2 weeks and working on Kegel exercises in the meantime to help with her concerns.     4. The patient will follow-up in 2 weeks for postoperative exam.      LAUREN Montoya MD

## 2024-03-11 ENCOUNTER — OFFICE VISIT (OUTPATIENT)
Dept: UROLOGY | Facility: CLINIC | Age: 79
End: 2024-03-11
Payer: MEDICARE

## 2024-03-11 VITALS
SYSTOLIC BLOOD PRESSURE: 119 MMHG | DIASTOLIC BLOOD PRESSURE: 70 MMHG | BODY MASS INDEX: 19.76 KG/M2 | WEIGHT: 98 LBS | HEIGHT: 59 IN | TEMPERATURE: 97.4 F | HEART RATE: 54 BPM

## 2024-03-11 DIAGNOSIS — N39.3 FEMALE STRESS INCONTINENCE: ICD-10-CM

## 2024-03-11 DIAGNOSIS — N81.4 CYSTOCELE WITH UTERINE DESCENSUS: ICD-10-CM

## 2024-03-11 DIAGNOSIS — Z09 POSTOP CHECK: Primary | ICD-10-CM

## 2024-03-11 DIAGNOSIS — N39.41 URGE INCONTINENCE: ICD-10-CM

## 2024-03-11 DIAGNOSIS — N81.89 PELVIC FLOOR WEAKNESS: ICD-10-CM

## 2024-03-11 PROCEDURE — 1126F AMNT PAIN NOTED NONE PRSNT: CPT | Performed by: OBSTETRICS & GYNECOLOGY

## 2024-03-11 PROCEDURE — 1036F TOBACCO NON-USER: CPT | Performed by: OBSTETRICS & GYNECOLOGY

## 2024-03-11 PROCEDURE — 1160F RVW MEDS BY RX/DR IN RCRD: CPT | Performed by: OBSTETRICS & GYNECOLOGY

## 2024-03-11 PROCEDURE — 1159F MED LIST DOCD IN RCRD: CPT | Performed by: OBSTETRICS & GYNECOLOGY

## 2024-03-11 PROCEDURE — 99024 POSTOP FOLLOW-UP VISIT: CPT | Performed by: OBSTETRICS & GYNECOLOGY

## 2024-03-11 RX ORDER — POVIDONE, POLYVINYL ALCOHOL 20; 27 G/1000ML; G/1000ML
SOLUTION OPHTHALMIC
COMMUNITY
Start: 2023-11-30 | End: 2024-04-23 | Stop reason: WASHOUT

## 2024-03-11 RX ORDER — OXYQUINOLINE SULFATE AND SODIUM LAURYL SULFATE .25; .1 MG/G; MG/G
JELLY VAGINAL
COMMUNITY
Start: 2023-07-14 | End: 2024-03-11 | Stop reason: WASHOUT

## 2024-03-11 RX ORDER — PREDNISOLONE ACETATE 10 MG/ML
SUSPENSION/ DROPS OPHTHALMIC
COMMUNITY
Start: 2023-10-26 | End: 2024-04-23 | Stop reason: WASHOUT

## 2024-03-11 RX ORDER — ROSUVASTATIN CALCIUM 5 MG/1
TABLET, COATED ORAL
COMMUNITY
End: 2024-04-23 | Stop reason: WASHOUT

## 2024-03-11 RX ORDER — KETOROLAC TROMETHAMINE 5 MG/ML
SOLUTION OPHTHALMIC
COMMUNITY
Start: 2023-05-08 | End: 2024-04-23 | Stop reason: WASHOUT

## 2024-03-11 RX ORDER — TRIAMCINOLONE ACETONIDE 1 MG/G
CREAM TOPICAL
COMMUNITY
Start: 2023-08-29 | End: 2024-04-23 | Stop reason: WASHOUT

## 2024-03-11 RX ORDER — DICLOFENAC SODIUM 10 MG/G
GEL TOPICAL
COMMUNITY
Start: 2023-07-28 | End: 2024-04-23 | Stop reason: WASHOUT

## 2024-03-11 RX ORDER — NEOMYCIN SULFATE, POLYMYXIN B SULFATE AND DEXAMETHASONE 3.5; 10000; 1 MG/ML; [USP'U]/ML; MG/ML
SUSPENSION/ DROPS OPHTHALMIC
COMMUNITY
Start: 2023-06-04 | End: 2024-04-23 | Stop reason: WASHOUT

## 2024-03-11 RX ORDER — CALCIUM CARB/VITAMIN D3/VIT K1 500-500-40
TABLET,CHEWABLE ORAL
COMMUNITY

## 2024-03-11 NOTE — PROGRESS NOTES
Subjective   Patient ID: Manjula Ríos is a 78 y.o. female who presents to discuss her pelvic complaints.  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     The patient continues to note worsening urinary incontinence. She appears to have spontaneous incontinence without even the sense of urge.  This appears to be stress-induced with movement.  She denies any regular episodes of nocturia but does wake up to empty her bladder.    She denies any vaginal complaints, no abnormal bleeding or discharge.     She denies any bowel related complaints, no fecal or flatal incontinence.    She has no other complaints.     From Previous note  78-year-old with symptomatic stage II anterior and apical pelvic organ prolapse, mild vaginal atrophy, and pelvic floor weakness having failed pessary management having undergone vaginal hysterectomy with bilateral salpingectomy, uterosacral ligament suspension, and perineorrhaphy 2/15/2024.     The patient  presents with worsening urinary incontinence. She utilized Oxybutynin but noted terrible dry mouth and throat complains. She also complained of  dizziness. She appears to have spontaneous incontinence without even the sense of urge.  This appears to be stress-induced with movement.  She denies any regular episodes of nocturia but does wake up to empty her bladder. She voids every 2-3 hours and yet has episodes of incontinence. UA shows small leukocytes. PVR was 0.    She denies any bowel related complaints, no fecal or flatal incontinence.    She denies any vaginal complaints, no abnormal bleeding or discharge. She appears to be doing well from the surgery  standpoint, she states that her pain is controlled.     She has no other complaints.    From Previous note   78-year-old with symptomatic stage II anterior and apical pelvic organ prolapse, mild vaginal atrophy, and pelvic floor weakness having failed pessary management having undergone vaginal hysterectomy with bilateral salpingectomy, uterosacral ligament suspension, and perineorrhaphy 2/15/2024.    The patient denies any vaginal complaints.  She denies any vaginal bulges.  She is having 2-3 regular soft bowel movements daily with the assistance of daily MiraLAX.  She denies any fecal incontinence.    However she has noted worsening urgency and frequency.  She notes episodic urinary leakage associated with and without urgency.  She denies any stress urinary incontinence complaints.  Urinalysis today appears negative.    The patient presents to discuss options for her pelvic organ prolapse. Surgical correction was discussed at length.    She continues to be bothered by her bulge.     She is continuing her vaginal estrogen therapy. She denies any vaginal discharge concerns at this time.     She denies any bowel related complaints, no fecal or flatal incontinence.     She has no other complaints.    From Previous note  78-year-old with symptomatic stage II anterior and apical pelvic organ prolapse, mild vaginal atrophy, and pelvic floor weakness having been fitted with a #4 ring with support and knob 5/16/2023 with persistent vaginal versus urinary leakage concerns having had her pessary removed 8/1/2023.    The patient has noted episodic urinary urgency with her first void of the day.  She notes daytime frequency roughly every 3 hours.  She denies any stress urinary incontinence.  She denies any significant vaginal bulge complaints.  She is utilizing her vaginal estrogen therapy.  She denies any bowel related complaints.    She has no other complaints.    From Previous note  77-year-old with symptomatic stage II  anterior and apical pelvic organ prolapse, mild vaginal atrophy, and pelvic floor weakness having been fitted with a #4 ring with support and knob 5/16/2023 with persistent vaginal versus urinary leakage concerns having had her pessary removed 8/1/2023 presenting for follow up.     The patient appears to be having excellent results without the pessary removed. She denies any abnormal vaginal discharge or bleeding. She only notes a vaginal bulge when doing jumping jacks or with a heavy cough. However this is easily reduced and this is not bothersome to her at this time.     She denies any worsening urinary urgency or frequency. She does have to rush to bathroom for her first void in the morning but that is not bothersome. She denies any episodes of nocturia or enuresis. She denies any UTI like symptoms.      She is continuing her vaginal estrogen therapy. She denies any vaginal discharge concerns at this time.     She denies any bowel related complaints, no fecal or flatal incontinence.     She has no other complaints.     From previous note   77-year-old with symptomatic stage II anterior and apical pelvic organ prolapse, mild vaginal atrophy, and pelvic floor weakness having been fitted with a #4 ring with support and knob 5/16/2023 with persistent vaginal versus urinary leakage concerns.     The patient reports increasing urinary urgency and frequency since the last visit. She states that she attempted to remove the pessary and noticed that she had increased leaking since then.      She denies any vaginal spotting. She states that she has been compliant with the topical estrogen and Trimo-Cornejo.      She denies constipation or any bowel-related symptoms.     She has no other complaints.      From previous note This visit was performed through telemedicine  77-year-old with symptomatic stage II anterior and apical pelvic organ prolapse, mild vaginal atrophy, and pelvic floor weakness having been fitted with a #4 ring  "with support and knob 5/16/2023 with persistent vaginal versus urinary leakage concerns.     Reports resolution of her malodorous urine s/p Trimo-Cornejo. Complains of persistent \"leakage\" that is vaginal vs urinary in nature, but she is not definitely sure, described as being more of a sensation. Last UDS was in June 2023. Reviewed UDS results again today, including being negative for leakage. Discussed option of removing her pessary at home vs in-office. She will schedule an appointment to have this removed.      She has no other complaints     From previous note  77-year-old with symptomatic stage II anterior and apical pelvic organ prolapse, mild vaginal atrophy, and pelvic floor weakness having been fitted with a #4 ring with support and knob 5/16/2023 with persistent urinary incontinence complaints.     The patient continues to note \"leakage\". She is wearing a pad and notes a \"yellowish\" leakage. She has proceeded with a phenazopyridine challenge and felt that this was bright orange in color and urine in nature. We discussed at length today her lower urinary tract symptoms. She denies any urgency or frequency. She notes urinating roughly every 4-5 hours during the daytime and notes 1 episode of nocturia. She denies any stress urinary incontinence. She is overall very satisfied with her pessary noting complete resolution of her vaginal bulge complaints. She is utilizing her vaginal estrogen therapy.     We discussed at length today my concerns that her discharge is vaginal in nature as opposed to urine.     She has no other complaints.     From previous note  This visit was performed through telemedicine  77-year-old with symptomatic stage II anterior and apical pelvic organ prolapse, mild vaginal atrophy, and pelvic floor weakness having been fitted with a #4 ring with support and knob 5/16/2023.     The patient was utilizing the Oxytrol patch but noted severe fatigue and pain in her lower extremities. She states " her symptoms resolved since she stopped utilizing it. It was noted that she has yellow urine on her pad with her phenazopyridine challenge. She denies any urgency but continues to leak on movement and laughing, coughing and sneezing. Bladder sling Surgery was discussed at length.      She denies any vaginal complaints, no abnormal vaginal bleeding or discharge.     She denies any bowel related complaints, no fecal or flatal incontinence.     She has no other complaints.     From previous note  This visit was performed through telemedicine  77-year-old with symptomatic stage II anterior and apical pelvic organ prolapse, mild vaginal atrophy, and pelvic floor weakness having been fitted with a #4 ring with support and knob 5/16/2023.     The patient presents to discuss her UDS test results, which demonstrates no filling defect, no bladder spasm, mild sensitivity, no obstruction and adequate emptying. The test does not demonstrate any signs of stress incontinence. she voids 4-5 times during the day but leaks in between and utilizes pads throughout the day, as she leaks with movement and at the sound of water. She denies any episodes of nocturia , however her pad is moist when she wakes up. She denies any UTI like symptoms.      She denies any vaginal complaints, no abnormal vaginal bleeding or discharge.     She denies any bowel related complaints, no fecal or flatal incontinence.     She has no other complaints.     From previous note  77-year-old with symptomatic stage II anterior and apical pelvic organ prolapse, mild vaginal atrophy, and pelvic floor weakness having been fitted with a #4 ring with support and knob 5/16/2023.     The patient reports of noting moderate incontinence after the pessary placed followed by a gush on sneezing. She voids 3-4 times during the day but leaks in between and utilizes pads throughout the day, as she leaks with movement. She denies any episodes of nocturia , however her pad is  moist when she wakes up. She denies any UTI like symptoms.      She does note near resolution of her bulge complaints with the pessary in place, however she is bothered by her lower urinary tract complaints. She denies any abnormal vaginal bleeding or discharge. She is utilizing the vaginal estrogen cream.     She denies any bowel related complaints, no fecal or flatal incontinence.     She has no other complaints.     From previous note  77-year-old with symptomatic stage II anterior and apical pelvic organ prolapse, mild vaginal atrophy, and pelvic floor weakness having been fitted with a #4 ring with support 4/25/2023.     The patient is satisfied from the pessary standpoint. She denies any vaginal complaints, no abnormal vaginal bleeding or discharge. She is utilizing the vaginal estrogen cream.     She complaints on leaking when she stands from sitting position. She does note leaking on laughing, cough or sneezing. She is noting 2-3 episodes of nocturia with urgency.     She denies any bowel related complaints, she did feel the pessary was pushing on her bowels but no fecal or flatal incontinence.     She has no other complaints.        From previous note  77-year-old with symptomatic stage II anterior and apical pelvic organ prolapse, mild vaginal atrophy, and pelvic floor weakness presenting for pessary fitting today 04/25/23.     She is utilizing the vaginal estrogen cream.      She has no other complaints.           From previous note  77- year-old patient presenting as a referral from Dr. Isbell with complaints of pelvic organ prolapse.     The patient states she noticed a vaginal bulge last night in the shower, she denies any pressure or pulling sensation. She also denies any vaginal bleeding or discharge. She is sexually active and denies any vaginal complaints. She was previously seen by Dr. Isbell and Dr. Sepulveda for he gynecological care.     She also note urinary urgency and incontinence. She denies  leaking on laughing, cough or sneezing. She denies any nocturia but denies any enuresis. She voids every hour during the day. She denies any history of nephrolithiasis, gross hematuria or chronic recurrent UTIs.      She complaints of constipation but has a bowel movement every morning, she has utilized MiraLAX in the past. She denies any fecal or flatal incontinence.      She has no other complaints.  Review of Systems    Objective   Physical Exam  Constitutional: No fever, No chills and No fatigue.   Eyes: No vision problems and No dryness of the eyes.   ENT: No dry mouth, No hearing loss and No nosebleeds.   Cardiovascular: No chest pain, No palpitations and No orthopnea.   Respiratory: No shortness of breath, No cough and No wheezing.   Gastrointestinal: No abdominal pain, No constipation, No nausea, No diarrhea, No vomiting and No melena.   Genitourinary: As noted in HPI.   Musculoskeletal: No back pain, No myalgias, No muscle weakness, No joint swelling and No leg edema.   Integumentary: No rashes, No skin lesion and No itching.   Neurological: No headache, No numbness and No dizziness.   Psychiatric: No sleep disturbances, No anxiety and No depression.   Endocrine: No hot flashes, No loss of hair and No hirsutism.   Hematologic/Lymphatic: No swollen glands, No tendency for easy bleeding and No tendency for easy bruising.   All other systems have been reviewed and are negative for complaint.       PHYSICAL EXAMINATION:  No LMP recorded (lmp unknown).  There is no height or weight on file to calculate BMI.  LMP  (LMP Unknown)   General Appearance: well appearing  Neuro: Alert and oriented   HEENT: mucous membranes moist, neck supple  Resp: No respiratory distress, normal work of breathing  MSK: normal range of motion, gait appropriate    Pelvic:  Genitourinary:  normal external genitalia, Bartholin's glands negative, [place]'s glands negative  Urethra   normal meatus, non-tender, no periurethral mass  Vaginal  mucosa atrophic, cuff is closed with PDS sutures intact.  There does not appear to be undue tension on the anterior vaginal wall.  Cervix surgically absent  Uterus surgically absent  Adnexae  negative nontender, no masses  Atrophy positive    CST negative  Pelvic floor muscle contraction  0/5    POP-Q (in supine position):       Aa -3     Ba -3     C -9              gh 3     pb 4     tvl 9              Ap -3     Bp -3     D     Rectal: no hemorrhoids, fissures or masses    Assessment/Plan     78-year-old with symptomatic stage II anterior and apical pelvic organ prolapse, mild vaginal atrophy, and pelvic floor weakness having failed pessary management having undergone vaginal hysterectomy with bilateral salpingectomy, uterosacral ligament suspension, and perineorrhaphy 2/15/2024 with what appears to be occult stress urinary incontinence postoperative complaints.      #1 patient has met all of her postoperative milestones.  She is released from all postoperative restrictions.  We discussed nothing in the vagina for 1 more week but that she could get back to her exercise routine.    2. We have previously discussed the patient's vaginal atrophy. She will continue her vaginal estrogen therapy 3 times a week.  She will restart this now.     3. We again discussed her acute worsening incontinence complaints following her surgery.  This appears to be stress urinary incontinence by history.  CST 2/27/2024 and today 3/11/2024 was negative.  She is emptying appropriately.  She had significant side effects associated with oxybutynin and has since stopped this medication.  Will proceed with urodynamic testing at her earliest convenience.  She is leaving for [place] in early April.     4. The patient will follow-up at her earliest convenience for urodynamic testing.     LAUREN Montoay MD

## 2024-03-11 NOTE — PATIENT INSTRUCTIONS
You are released from all postoperative restrictions.    Please follow-up at your earliest convenience for urodynamic testing.    We will follow-up virtually 24 to 48 hours thereafter.    Please contact the clinic with any questions or concerns.    [phone]

## 2024-03-13 ENCOUNTER — PROCEDURE VISIT (OUTPATIENT)
Dept: UROLOGY | Facility: CLINIC | Age: 79
End: 2024-03-13
Payer: MEDICARE

## 2024-03-13 DIAGNOSIS — N81.4 CYSTOCELE WITH UTERINE DESCENSUS: ICD-10-CM

## 2024-03-13 DIAGNOSIS — N39.41 URGE INCONTINENCE: Primary | ICD-10-CM

## 2024-03-13 DIAGNOSIS — N39.3 FEMALE STRESS INCONTINENCE: ICD-10-CM

## 2024-03-13 LAB
POC APPEARANCE, URINE: ABNORMAL
POC BILIRUBIN, URINE: NEGATIVE
POC BLOOD, URINE: NEGATIVE
POC COLOR, URINE: YELLOW
POC GLUCOSE, URINE: NEGATIVE MG/DL
POC KETONES, URINE: NEGATIVE MG/DL
POC LEUKOCYTES, URINE: NEGATIVE
POC NITRITE,URINE: NEGATIVE
POC PH, URINE: 8.5 PH
POC PROTEIN, URINE: NEGATIVE MG/DL
POC SPECIFIC GRAVITY, URINE: 1.02
POC UROBILINOGEN, URINE: 0.2 EU/DL

## 2024-03-13 PROCEDURE — 51797 INTRAABDOMINAL PRESSURE TEST: CPT | Performed by: UROLOGY

## 2024-03-13 PROCEDURE — 81003 URINALYSIS AUTO W/O SCOPE: CPT | Performed by: UROLOGY

## 2024-03-13 PROCEDURE — 51729 CYSTOMETROGRAM W/VP&UP: CPT | Performed by: UROLOGY

## 2024-03-13 PROCEDURE — 51784 ANAL/URINARY MUSCLE STUDY: CPT | Performed by: UROLOGY

## 2024-03-13 NOTE — PROGRESS NOTES
Manjula íRos 79 y/o female     Patient was referred by Dr. Montoya to evaluate urinary urge  incontinence.  Dr. Zamora was present in the office at time of study.  Pre uroflow was not completed.  Urine was clear for UTI.  Patient did not leak on CLPP/VLPP.  Patient did not have DO with leak.    A total of 450 mls were infused and PVR after study was 0ml.    Patient instructed to increase fluids if blood in the urine or burning due to cath insertion.  Patient understood and consented to Urodynamics.  Patient will follow up with Dr. Montoya to review results.  03/13/24 CM

## 2024-03-18 ENCOUNTER — TELEMEDICINE (OUTPATIENT)
Dept: UROLOGY | Facility: CLINIC | Age: 79
End: 2024-03-18
Payer: MEDICARE

## 2024-03-18 DIAGNOSIS — N81.89 PELVIC FLOOR WEAKNESS: ICD-10-CM

## 2024-03-18 DIAGNOSIS — N81.4 CYSTOCELE WITH UTERINE DESCENSUS: ICD-10-CM

## 2024-03-18 DIAGNOSIS — N39.41 URGE INCONTINENCE: Primary | ICD-10-CM

## 2024-03-18 DIAGNOSIS — N39.3 FEMALE STRESS INCONTINENCE: ICD-10-CM

## 2024-03-18 PROCEDURE — 1036F TOBACCO NON-USER: CPT | Performed by: OBSTETRICS & GYNECOLOGY

## 2024-03-18 PROCEDURE — 51741 ELECTRO-UROFLOWMETRY FIRST: CPT | Performed by: OBSTETRICS & GYNECOLOGY

## 2024-03-18 PROCEDURE — 99442 PR PHYS/QHP TELEPHONE EVALUATION 11-20 MIN: CPT | Performed by: OBSTETRICS & GYNECOLOGY

## 2024-03-18 PROCEDURE — 51784 ANAL/URINARY MUSCLE STUDY: CPT | Performed by: OBSTETRICS & GYNECOLOGY

## 2024-03-18 PROCEDURE — 1160F RVW MEDS BY RX/DR IN RCRD: CPT | Performed by: OBSTETRICS & GYNECOLOGY

## 2024-03-18 PROCEDURE — 51797 INTRAABDOMINAL PRESSURE TEST: CPT | Performed by: OBSTETRICS & GYNECOLOGY

## 2024-03-18 PROCEDURE — 1159F MED LIST DOCD IN RCRD: CPT | Performed by: OBSTETRICS & GYNECOLOGY

## 2024-03-18 PROCEDURE — 51729 CYSTOMETROGRAM W/VP&UP: CPT | Performed by: OBSTETRICS & GYNECOLOGY

## 2024-03-18 NOTE — PROGRESS NOTES
Subjective   Patient ID: Manjula Ríos is a 78 y.o. female who presents to discuss her UDS test.  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 She denies any episodes of nocturia or enuresis, she denies any terminal incontinence on the way to the bathroom in the morning. During the day every 2 hours she is wet. She appears to have spontaneous incontinence without even the sense of urge.     She denies any vaginal complaints, no abnormal bleeding or discharge.     She denies any bowel related complaints, no fecal or flatal incontinence.    She has no other complaints.     From Previous note  78-year-old with symptomatic stage II anterior and apical pelvic organ prolapse, mild vaginal atrophy, and pelvic floor weakness having failed pessary management having undergone vaginal hysterectomy with bilateral salpingectomy, uterosacral ligament suspension, and perineorrhaphy 2/15/2024 with what appears to be occult stress urinary incontinence postoperative complaints.     The patient continues to note worsening urinary incontinence. She appears to have spontaneous incontinence without even the sense of urge.  This appears to be stress-induced with movement.  She denies any regular episodes of nocturia but does wake up to empty her bladder.    She denies any vaginal complaints, no abnormal bleeding or discharge.     She denies any bowel related complaints, no fecal or flatal incontinence.    She has no other complaints.     From Previous  note  78-year-old with symptomatic stage II anterior and apical pelvic organ prolapse, mild vaginal atrophy, and pelvic floor weakness having failed pessary management having undergone vaginal hysterectomy with bilateral salpingectomy, uterosacral ligament suspension, and perineorrhaphy 2/15/2024.     The patient  presents with worsening urinary incontinence. She utilized Oxybutynin but noted terrible dry mouth and throat complains. She also complained of  dizziness. She appears to have spontaneous incontinence without even the sense of urge.  This appears to be stress-induced with movement.  She denies any regular episodes of nocturia but does wake up to empty her bladder. She voids every 2-3 hours and yet has episodes of incontinence. UA shows small leukocytes. PVR was 0.    She denies any bowel related complaints, no fecal or flatal incontinence.    She denies any vaginal complaints, no abnormal bleeding or discharge. She appears to be doing well from the surgery standpoint, she states that her pain is controlled.     She has no other complaints.    From Previous note   78-year-old with symptomatic stage II anterior and apical pelvic organ prolapse, mild vaginal atrophy, and pelvic floor weakness having failed pessary management having undergone vaginal hysterectomy with bilateral salpingectomy, uterosacral ligament suspension, and perineorrhaphy 2/15/2024.    The patient denies any vaginal complaints.  She denies any vaginal bulges.  She is having 2-3 regular soft bowel movements daily with the assistance of daily MiraLAX.  She denies any fecal incontinence.    However she has noted worsening urgency and frequency.  She notes episodic urinary leakage associated with and without urgency.  She denies any stress urinary incontinence complaints.  Urinalysis today appears negative.    The patient presents to discuss options for her pelvic organ prolapse. Surgical correction was discussed at length.    She continues  to be bothered by her bulge.     She is continuing her vaginal estrogen therapy. She denies any vaginal discharge concerns at this time.     She denies any bowel related complaints, no fecal or flatal incontinence.     She has no other complaints.    From Previous note  78-year-old with symptomatic stage II anterior and apical pelvic organ prolapse, mild vaginal atrophy, and pelvic floor weakness having been fitted with a #4 ring with support and knob 5/16/2023 with persistent vaginal versus urinary leakage concerns having had her pessary removed 8/1/2023.    The patient has noted episodic urinary urgency with her first void of the day.  She notes daytime frequency roughly every 3 hours.  She denies any stress urinary incontinence.  She denies any significant vaginal bulge complaints.  She is utilizing her vaginal estrogen therapy.  She denies any bowel related complaints.    She has no other complaints.    From Previous note  77-year-old with symptomatic stage II anterior and apical pelvic organ prolapse, mild vaginal atrophy, and pelvic floor weakness having been fitted with a #4 ring with support and knob 5/16/2023 with persistent vaginal versus urinary leakage concerns having had her pessary removed 8/1/2023 presenting for follow up.     The patient appears to be having excellent results without the pessary removed. She denies any abnormal vaginal discharge or bleeding. She only notes a vaginal bulge when doing jumping jacks or with a heavy cough. However this is easily reduced and this is not bothersome to her at this time.     She denies any worsening urinary urgency or frequency. She does have to rush to bathroom for her first void in the morning but that is not bothersome. She denies any episodes of nocturia or enuresis. She denies any UTI like symptoms.      She is continuing her vaginal estrogen therapy. She denies any vaginal discharge concerns at this time.     She denies any bowel related complaints, no  "fecal or flatal incontinence.     She has no other complaints.     From previous note   77-year-old with symptomatic stage II anterior and apical pelvic organ prolapse, mild vaginal atrophy, and pelvic floor weakness having been fitted with a #4 ring with support and knob 5/16/2023 with persistent vaginal versus urinary leakage concerns.     The patient reports increasing urinary urgency and frequency since the last visit. She states that she attempted to remove the pessary and noticed that she had increased leaking since then.      She denies any vaginal spotting. She states that she has been compliant with the topical estrogen and Trimo-Cornejo.      She denies constipation or any bowel-related symptoms.     She has no other complaints.      From previous note This visit was performed through telemedicine  77-year-old with symptomatic stage II anterior and apical pelvic organ prolapse, mild vaginal atrophy, and pelvic floor weakness having been fitted with a #4 ring with support and knob 5/16/2023 with persistent vaginal versus urinary leakage concerns.     Reports resolution of her malodorous urine s/p Trimo-Cornejo. Complains of persistent \"leakage\" that is vaginal vs urinary in nature, but she is not definitely sure, described as being more of a sensation. Last UDS was in June 2023. Reviewed UDS results again today, including being negative for leakage. Discussed option of removing her pessary at home vs in-office. She will schedule an appointment to have this removed.      She has no other complaints     From previous note  77-year-old with symptomatic stage II anterior and apical pelvic organ prolapse, mild vaginal atrophy, and pelvic floor weakness having been fitted with a #4 ring with support and knob 5/16/2023 with persistent urinary incontinence complaints.     The patient continues to note \"leakage\". She is wearing a pad and notes a \"yellowish\" leakage. She has proceeded with a phenazopyridine challenge and " felt that this was bright orange in color and urine in nature. We discussed at length today her lower urinary tract symptoms. She denies any urgency or frequency. She notes urinating roughly every 4-5 hours during the daytime and notes 1 episode of nocturia. She denies any stress urinary incontinence. She is overall very satisfied with her pessary noting complete resolution of her vaginal bulge complaints. She is utilizing her vaginal estrogen therapy.     We discussed at length today my concerns that her discharge is vaginal in nature as opposed to urine.     She has no other complaints.     From previous note  This visit was performed through telemedicine  77-year-old with symptomatic stage II anterior and apical pelvic organ prolapse, mild vaginal atrophy, and pelvic floor weakness having been fitted with a #4 ring with support and knob 5/16/2023.     The patient was utilizing the Oxytrol patch but noted severe fatigue and pain in her lower extremities. She states her symptoms resolved since she stopped utilizing it. It was noted that she has yellow urine on her pad with her phenazopyridine challenge. She denies any urgency but continues to leak on movement and laughing, coughing and sneezing. Bladder sling Surgery was discussed at length.      She denies any vaginal complaints, no abnormal vaginal bleeding or discharge.     She denies any bowel related complaints, no fecal or flatal incontinence.     She has no other complaints.     From previous note  This visit was performed through telemedicine  77-year-old with symptomatic stage II anterior and apical pelvic organ prolapse, mild vaginal atrophy, and pelvic floor weakness having been fitted with a #4 ring with support and knob 5/16/2023.     The patient presents to discuss her UDS test results, which demonstrates no filling defect, no bladder spasm, mild sensitivity, no obstruction and adequate emptying. The test does not demonstrate any signs of stress  incontinence. she voids 4-5 times during the day but leaks in between and utilizes pads throughout the day, as she leaks with movement and at the sound of water. She denies any episodes of nocturia , however her pad is moist when she wakes up. She denies any UTI like symptoms.      She denies any vaginal complaints, no abnormal vaginal bleeding or discharge.     She denies any bowel related complaints, no fecal or flatal incontinence.     She has no other complaints.     From previous note  77-year-old with symptomatic stage II anterior and apical pelvic organ prolapse, mild vaginal atrophy, and pelvic floor weakness having been fitted with a #4 ring with support and knob 5/16/2023.     The patient reports of noting moderate incontinence after the pessary placed followed by a gush on sneezing. She voids 3-4 times during the day but leaks in between and utilizes pads throughout the day, as she leaks with movement. She denies any episodes of nocturia , however her pad is moist when she wakes up. She denies any UTI like symptoms.      She does note near resolution of her bulge complaints with the pessary in place, however she is bothered by her lower urinary tract complaints. She denies any abnormal vaginal bleeding or discharge. She is utilizing the vaginal estrogen cream.     She denies any bowel related complaints, no fecal or flatal incontinence.     She has no other complaints.     From previous note  77-year-old with symptomatic stage II anterior and apical pelvic organ prolapse, mild vaginal atrophy, and pelvic floor weakness having been fitted with a #4 ring with support 4/25/2023.     The patient is satisfied from the pessary standpoint. She denies any vaginal complaints, no abnormal vaginal bleeding or discharge. She is utilizing the vaginal estrogen cream.     She complaints on leaking when she stands from sitting position. She does note leaking on laughing, cough or sneezing. She is noting 2-3 episodes of  nocturia with urgency.     She denies any bowel related complaints, she did feel the pessary was pushing on her bowels but no fecal or flatal incontinence.     She has no other complaints.        From previous note  77-year-old with symptomatic stage II anterior and apical pelvic organ prolapse, mild vaginal atrophy, and pelvic floor weakness presenting for pessary fitting today 04/25/23.     She is utilizing the vaginal estrogen cream.      She has no other complaints.           From previous note  77- year-old patient presenting as a referral from Dr. Isbell with complaints of pelvic organ prolapse.     The patient states she noticed a vaginal bulge last night in the shower, she denies any pressure or pulling sensation. She also denies any vaginal bleeding or discharge. She is sexually active and denies any vaginal complaints. She was previously seen by Dr. Isbell and Dr. Sepulveda for he gynecological care.     She also note urinary urgency and incontinence. She denies leaking on laughing, cough or sneezing. She denies any nocturia but denies any enuresis. She voids every hour during the day. She denies any history of nephrolithiasis, gross hematuria or chronic recurrent UTIs.      She complaints of constipation but has a bowel movement every morning, she has utilized MiraLAX in the past. She denies any fecal or flatal incontinence.      She has no other complaints.  Review of Systems    Objective   Physical Exam  Constitutional: No fever, No chills and No fatigue.   Eyes: No vision problems and No dryness of the eyes.   ENT: No dry mouth, No hearing loss and No nosebleeds.   Cardiovascular: No chest pain, No palpitations and No orthopnea.   Respiratory: No shortness of breath, No cough and No wheezing.   Gastrointestinal: No abdominal pain, No constipation, No nausea, No diarrhea, No vomiting and No melena.   Genitourinary: As noted in HPI.   Musculoskeletal: No back pain, No myalgias, No muscle weakness, No joint  swelling and No leg edema.   Integumentary: No rashes, No skin lesion and No itching.   Neurological: No headache, No numbness and No dizziness.   Psychiatric: No sleep disturbances, No anxiety and No depression.   Endocrine: No hot flashes, No loss of hair and No hirsutism.   Hematologic/Lymphatic: No swollen glands, No tendency for easy bleeding and No tendency for easy bruising.   All other systems have been reviewed and are negative for complaint.       PHYSICAL EXAMINATION:  This visit was performed through telemedicine     Urodynamics was performed 3/13/2024.  During free uroflow, the patient voided a volume of 11 cc with a PVR of 27 cc.      During the filling phase, the patient had normal sensation with for sensation at 191 cc, first desire 278 cc, strong desire at 336 cc, and capacity at 387 cc.  There was no evidence of genuine stress urinary incontinence at a volume of 150 cc.  The patient had what appeared to be normal compliance and normal EMG use.  During the voiding phase, the patient voided a volume of 432 cc, with a maximum flow rate of 41 cc a second with an average flow rate of 6.2 cc a second with a PVR of 0.  The patient had a peak detrusor pressure of 17 cm of water with mild elevation of her EMG during the voiding phase.    Assessment/Plan     78-year-old with symptomatic stage II anterior and apical pelvic organ prolapse, mild vaginal atrophy, and pelvic floor weakness having failed pessary management having undergone vaginal hysterectomy with bilateral salpingectomy, uterosacral ligament suspension, and perineorrhaphy 2/15/2024 with what postoperative urinary incontinence complaints.     #1 patient is overall very satisfied from a prolapse standpoint.  She is released from all postoperative restrictions.    2. We have previously discussed the patient's vaginal atrophy. She will continue her vaginal estrogen therapy 3 times a week.       3. We again discussed her acute worsening incontinence  complaints following her surgery.  This appears to be stress urinary incontinence by history.  CST 2/27/2024 and 3/11/2024 was negative.  She is emptying appropriately.  She had significant side effects associated with oxybutynin and has since stopped this medication.  Urodynamics was overall reassuring with a voided volume of 432 cc with no evidence of genuine stress urinary incontinence or DO.  We discussed the confusing nature of her complaints and her reassuring urodynamic findings.  The patient will present at her earliest convenience for cystoscopic evaluation and thorough exam.  We will discuss at that time possible incontinence pessary ring as well as potential for Bulkamid or mid urethral sling given the fact that the patient appears to have incontinence in association with movement, most consistent with a stress urinary incontinence picture.     4. The patient will follow-up at her earliest convenience for office cystoscopy and possible fitting with a #2 or #3 incontinence ring.     LAUREN Montoya MD     Scribe Attestation  By signing my name below, [employer] Scribe attest that this documentation has been prepared under the direction and in the presence of Ignacio Montoya MD. All medical record entries made by the Scribe were at my direction or personally dictated by me. I have reviewed the chart and agree that the record accurately reflects my personal performance of the history, physical exam, discussion and plan.

## 2024-03-19 ENCOUNTER — OFFICE VISIT (OUTPATIENT)
Dept: UROLOGY | Facility: CLINIC | Age: 79
End: 2024-03-19
Payer: MEDICARE

## 2024-03-19 VITALS
DIASTOLIC BLOOD PRESSURE: 61 MMHG | WEIGHT: 98.1 LBS | SYSTOLIC BLOOD PRESSURE: 118 MMHG | HEART RATE: 59 BPM | BODY MASS INDEX: 20.15 KG/M2

## 2024-03-19 DIAGNOSIS — N39.3 FEMALE STRESS INCONTINENCE: ICD-10-CM

## 2024-03-19 DIAGNOSIS — N81.89 PELVIC FLOOR WEAKNESS: Primary | ICD-10-CM

## 2024-03-19 DIAGNOSIS — N81.4 CYSTOCELE WITH UTERINE DESCENSUS: ICD-10-CM

## 2024-03-19 DIAGNOSIS — N39.41 URGE INCONTINENCE: ICD-10-CM

## 2024-03-19 PROCEDURE — 99213 OFFICE O/P EST LOW 20 MIN: CPT | Performed by: OBSTETRICS & GYNECOLOGY

## 2024-03-19 PROCEDURE — 57160 INSERT PESSARY/OTHER DEVICE: CPT | Performed by: OBSTETRICS & GYNECOLOGY

## 2024-03-19 PROCEDURE — 52000 CYSTOURETHROSCOPY: CPT | Performed by: OBSTETRICS & GYNECOLOGY

## 2024-03-19 PROCEDURE — A4562 PESSARY, NON RUBBER,ANY TYPE: HCPCS | Performed by: OBSTETRICS & GYNECOLOGY

## 2024-03-19 PROCEDURE — 81003 URINALYSIS AUTO W/O SCOPE: CPT | Mod: QW | Performed by: OBSTETRICS & GYNECOLOGY

## 2024-03-19 PROCEDURE — 1159F MED LIST DOCD IN RCRD: CPT | Performed by: OBSTETRICS & GYNECOLOGY

## 2024-03-19 PROCEDURE — 1036F TOBACCO NON-USER: CPT | Performed by: OBSTETRICS & GYNECOLOGY

## 2024-03-19 PROCEDURE — 1160F RVW MEDS BY RX/DR IN RCRD: CPT | Performed by: OBSTETRICS & GYNECOLOGY

## 2024-03-19 NOTE — PROGRESS NOTES
Subjective   Patient ID: Manjula Ríos is a 78 y.o. female who presents to discuss her UDS test.  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    She has no other complaints.     From Previous note  This visit was performed through telemedicine  78-year-old with symptomatic stage II anterior and apical pelvic organ prolapse, mild vaginal atrophy, and pelvic floor weakness having failed pessary management having undergone vaginal hysterectomy with bilateral salpingectomy, uterosacral ligament suspension, and perineorrhaphy 2/15/2024 with what appears to be occult stress urinary incontinence postoperative complaints presenting to discuss urodynamics    The patient continues to note worsening urinary incontinence. She denies any episodes of nocturia or enuresis, she denies any terminal incontinence on the way to the bathroom in the morning. During the day every 2 hours she is wet. She appears to have spontaneous incontinence without even the sense of urge.     She denies any vaginal complaints, no abnormal bleeding or discharge.     She denies any bowel related complaints, no fecal or flatal incontinence.    She has no other complaints.     From Previous note  78-year-old with symptomatic stage II anterior and apical pelvic organ prolapse, mild vaginal atrophy, and pelvic floor weakness having failed pessary management having undergone vaginal hysterectomy with bilateral salpingectomy, uterosacral ligament suspension, and perineorrhaphy 2/15/2024 with what appears to be occult stress urinary incontinence postoperative complaints.     The patient continues to  note worsening urinary incontinence. She appears to have spontaneous incontinence without even the sense of urge.  This appears to be stress-induced with movement.  She denies any regular episodes of nocturia but does wake up to empty her bladder.    She denies any vaginal complaints, no abnormal bleeding or discharge.     She denies any bowel related complaints, no fecal or flatal incontinence.    She has no other complaints.     From Previous note  78-year-old with symptomatic stage II anterior and apical pelvic organ prolapse, mild vaginal atrophy, and pelvic floor weakness having failed pessary management having undergone vaginal hysterectomy with bilateral salpingectomy, uterosacral ligament suspension, and perineorrhaphy 2/15/2024.     The patient  presents with worsening urinary incontinence. She utilized Oxybutynin but noted terrible dry mouth and throat complains. She also complained of  dizziness. She appears to have spontaneous incontinence without even the sense of urge.  This appears to be stress-induced with movement.  She denies any regular episodes of nocturia but does wake up to empty her bladder. She voids every 2-3 hours and yet has episodes of incontinence. UA shows small leukocytes. PVR was 0.    She denies any bowel related complaints, no fecal or flatal incontinence.    She denies any vaginal complaints, no abnormal bleeding or discharge. She appears to be doing well from the surgery standpoint, she states that her pain is controlled.     She has no other complaints.    From Previous note   78-year-old with symptomatic stage II anterior and apical pelvic organ prolapse, mild vaginal atrophy, and pelvic floor weakness having failed pessary management having undergone vaginal hysterectomy with bilateral salpingectomy, uterosacral ligament suspension, and perineorrhaphy 2/15/2024.    The patient denies any vaginal complaints.  She denies any vaginal bulges.  She is having 2-3 regular soft bowel  movements daily with the assistance of daily MiraLAX.  She denies any fecal incontinence.    However she has noted worsening urgency and frequency.  She notes episodic urinary leakage associated with and without urgency.  She denies any stress urinary incontinence complaints.  Urinalysis today appears negative.    The patient presents to discuss options for her pelvic organ prolapse. Surgical correction was discussed at length.    She continues to be bothered by her bulge.     She is continuing her vaginal estrogen therapy. She denies any vaginal discharge concerns at this time.     She denies any bowel related complaints, no fecal or flatal incontinence.     She has no other complaints.    From Previous note  78-year-old with symptomatic stage II anterior and apical pelvic organ prolapse, mild vaginal atrophy, and pelvic floor weakness having been fitted with a #4 ring with support and knob 5/16/2023 with persistent vaginal versus urinary leakage concerns having had her pessary removed 8/1/2023.    The patient has noted episodic urinary urgency with her first void of the day.  She notes daytime frequency roughly every 3 hours.  She denies any stress urinary incontinence.  She denies any significant vaginal bulge complaints.  She is utilizing her vaginal estrogen therapy.  She denies any bowel related complaints.    She has no other complaints.    From Previous note  77-year-old with symptomatic stage II anterior and apical pelvic organ prolapse, mild vaginal atrophy, and pelvic floor weakness having been fitted with a #4 ring with support and knob 5/16/2023 with persistent vaginal versus urinary leakage concerns having had her pessary removed 8/1/2023 presenting for follow up.     The patient appears to be having excellent results without the pessary removed. She denies any abnormal vaginal discharge or bleeding. She only notes a vaginal bulge when doing jumping jacks or with a heavy cough. However this is easily  "reduced and this is not bothersome to her at this time.     She denies any worsening urinary urgency or frequency. She does have to rush to bathroom for her first void in the morning but that is not bothersome. She denies any episodes of nocturia or enuresis. She denies any UTI like symptoms.      She is continuing her vaginal estrogen therapy. She denies any vaginal discharge concerns at this time.     She denies any bowel related complaints, no fecal or flatal incontinence.     She has no other complaints.     From previous note   77-year-old with symptomatic stage II anterior and apical pelvic organ prolapse, mild vaginal atrophy, and pelvic floor weakness having been fitted with a #4 ring with support and knob 5/16/2023 with persistent vaginal versus urinary leakage concerns.     The patient reports increasing urinary urgency and frequency since the last visit. She states that she attempted to remove the pessary and noticed that she had increased leaking since then.      She denies any vaginal spotting. She states that she has been compliant with the topical estrogen and Trimo-Cornejo.      She denies constipation or any bowel-related symptoms.     She has no other complaints.      From previous note This visit was performed through telemedicine  77-year-old with symptomatic stage II anterior and apical pelvic organ prolapse, mild vaginal atrophy, and pelvic floor weakness having been fitted with a #4 ring with support and knob 5/16/2023 with persistent vaginal versus urinary leakage concerns.     Reports resolution of her malodorous urine s/p Trimo-Cornejo. Complains of persistent \"leakage\" that is vaginal vs urinary in nature, but she is not definitely sure, described as being more of a sensation. Last UDS was in June 2023. Reviewed UDS results again today, including being negative for leakage. Discussed option of removing her pessary at home vs in-office. She will schedule an appointment to have this removed.    " "  She has no other complaints     From previous note  77-year-old with symptomatic stage II anterior and apical pelvic organ prolapse, mild vaginal atrophy, and pelvic floor weakness having been fitted with a #4 ring with support and knob 5/16/2023 with persistent urinary incontinence complaints.     The patient continues to note \"leakage\". She is wearing a pad and notes a \"yellowish\" leakage. She has proceeded with a phenazopyridine challenge and felt that this was bright orange in color and urine in nature. We discussed at length today her lower urinary tract symptoms. She denies any urgency or frequency. She notes urinating roughly every 4-5 hours during the daytime and notes 1 episode of nocturia. She denies any stress urinary incontinence. She is overall very satisfied with her pessary noting complete resolution of her vaginal bulge complaints. She is utilizing her vaginal estrogen therapy.     We discussed at length today my concerns that her discharge is vaginal in nature as opposed to urine.     She has no other complaints.     From previous note  This visit was performed through telemedicine  77-year-old with symptomatic stage II anterior and apical pelvic organ prolapse, mild vaginal atrophy, and pelvic floor weakness having been fitted with a #4 ring with support and knob 5/16/2023.     The patient was utilizing the Oxytrol patch but noted severe fatigue and pain in her lower extremities. She states her symptoms resolved since she stopped utilizing it. It was noted that she has yellow urine on her pad with her phenazopyridine challenge. She denies any urgency but continues to leak on movement and laughing, coughing and sneezing. Bladder sling Surgery was discussed at length.      She denies any vaginal complaints, no abnormal vaginal bleeding or discharge.     She denies any bowel related complaints, no fecal or flatal incontinence.     She has no other complaints.     From previous note  This visit was " performed through telemedicine  77-year-old with symptomatic stage II anterior and apical pelvic organ prolapse, mild vaginal atrophy, and pelvic floor weakness having been fitted with a #4 ring with support and knob 5/16/2023.     The patient presents to discuss her UDS test results, which demonstrates no filling defect, no bladder spasm, mild sensitivity, no obstruction and adequate emptying. The test does not demonstrate any signs of stress incontinence. she voids 4-5 times during the day but leaks in between and utilizes pads throughout the day, as she leaks with movement and at the sound of water. She denies any episodes of nocturia , however her pad is moist when she wakes up. She denies any UTI like symptoms.      She denies any vaginal complaints, no abnormal vaginal bleeding or discharge.     She denies any bowel related complaints, no fecal or flatal incontinence.     She has no other complaints.     From previous note  77-year-old with symptomatic stage II anterior and apical pelvic organ prolapse, mild vaginal atrophy, and pelvic floor weakness having been fitted with a #4 ring with support and knob 5/16/2023.     The patient reports of noting moderate incontinence after the pessary placed followed by a gush on sneezing. She voids 3-4 times during the day but leaks in between and utilizes pads throughout the day, as she leaks with movement. She denies any episodes of nocturia , however her pad is moist when she wakes up. She denies any UTI like symptoms.      She does note near resolution of her bulge complaints with the pessary in place, however she is bothered by her lower urinary tract complaints. She denies any abnormal vaginal bleeding or discharge. She is utilizing the vaginal estrogen cream.     She denies any bowel related complaints, no fecal or flatal incontinence.     She has no other complaints.     From previous note  77-year-old with symptomatic stage II anterior and apical pelvic organ  prolapse, mild vaginal atrophy, and pelvic floor weakness having been fitted with a #4 ring with support 4/25/2023.     The patient is satisfied from the pessary standpoint. She denies any vaginal complaints, no abnormal vaginal bleeding or discharge. She is utilizing the vaginal estrogen cream.     She complaints on leaking when she stands from sitting position. She does note leaking on laughing, cough or sneezing. She is noting 2-3 episodes of nocturia with urgency.     She denies any bowel related complaints, she did feel the pessary was pushing on her bowels but no fecal or flatal incontinence.     She has no other complaints.        From previous note  77-year-old with symptomatic stage II anterior and apical pelvic organ prolapse, mild vaginal atrophy, and pelvic floor weakness presenting for pessary fitting today 04/25/23.     She is utilizing the vaginal estrogen cream.      She has no other complaints.           From previous note  77- year-old patient presenting as a referral from Dr. Isbell with complaints of pelvic organ prolapse.     The patient states she noticed a vaginal bulge last night in the shower, she denies any pressure or pulling sensation. She also denies any vaginal bleeding or discharge. She is sexually active and denies any vaginal complaints. She was previously seen by Dr. Isbell and Dr. Sepulveda for he gynecological care.     She also note urinary urgency and incontinence. She denies leaking on laughing, cough or sneezing. She denies any nocturia but denies any enuresis. She voids every hour during the day. She denies any history of nephrolithiasis, gross hematuria or chronic recurrent UTIs.      She complaints of constipation but has a bowel movement every morning, she has utilized MiraLAX in the past. She denies any fecal or flatal incontinence.      She has no other complaints.  Review of Systems    Objective   Physical Exam  Constitutional: No fever, No chills and No fatigue.   Eyes:  No vision problems and No dryness of the eyes.   ENT: No dry mouth, No hearing loss and No nosebleeds.   Cardiovascular: No chest pain, No palpitations and No orthopnea.   Respiratory: No shortness of breath, No cough and No wheezing.   Gastrointestinal: No abdominal pain, No constipation, No nausea, No diarrhea, No vomiting and No melena.   Genitourinary: As noted in HPI.   Musculoskeletal: No back pain, No myalgias, No muscle weakness, No joint swelling and No leg edema.   Integumentary: No rashes, No skin lesion and No itching.   Neurological: No headache, No numbness and No dizziness.   Psychiatric: No sleep disturbances, No anxiety and No depression.   Endocrine: No hot flashes, No loss of hair and No hirsutism.   Hematologic/Lymphatic: No swollen glands, No tendency for easy bleeding and No tendency for easy bruising.   All other systems have been reviewed and are negative for complaint.     PHYSICAL EXAMINATION:  No LMP recorded.  Body mass index is 20.15 kg/m².  /61   Pulse 59   Wt (!) 44.5 kg (98 lb 1.6 oz)   BMI 20.15 kg/m²   General Appearance: well appearing  Neuro: Alert and oriented   HEENT: mucous membranes moist, neck supple  Resp: No respiratory distress, normal work of breathing  MSK: normal range of motion, gait appropriate    After consent was signed, the patient was prepped with iodine and lidocaine gel.  A flexible cystoscope was then introduced into the bladder.  Ureteral orifices were in the normal orthotopic position.  There were no bladder wall defects or other abnormalities.  There was no abnormalities on retroflexion.  There was no abnormalities of the urethra.    The patient was then fitted with a #2 ring with support and knob    Assessment/Plan     78-year-old with symptomatic stage II anterior and apical pelvic organ prolapse, mild vaginal atrophy, and pelvic floor weakness having failed pessary management having undergone vaginal hysterectomy with bilateral salpingectomy,  uterosacral ligament suspension, and perineorrhaphy 2/15/2024 with what postoperative urinary incontinence complaints having undergone normal cystoscopic evaluation today  03/19 / 2024.       #1 patient is overall very satisfied from a prolapse standpoint.  She is released from all postoperative restrictions.    2. We have previously discussed the patient's vaginal atrophy. She will continue her vaginal estrogen therapy 3 times a week.       3. We again discussed her acute worsening incontinence complaints following her surgery.  This appears to be stress urinary incontinence by history.  CST 2/27/2024 and 3/11/2024 was negative.  She is emptying appropriately.  She had significant side effects associated with oxybutynin and has since stopped this medication.  Urodynamics was overall reassuring with a voided volume of 432 cc with no evidence of genuine stress urinary incontinence or DO.  We discussed the confusing nature of her complaints and her reassuring urodynamic findings.  Cystoscopic evaluation today 3/19/2024 demonstrated no concerns for a fistula or other bladder wall abnormalities explaining her incontinence complaints while standing and moving.  The patient was fitted with a #2 ring with support and knob pessary today 3/19/2024.  We will readdress the possibility of Bulkamid or mid urethral sling pending the results of her pessary.    4. The patient will follow-up in 1-2 weeks to discuss her pessary and lower urinary tract complaints.     LAUREN Montoya MD     Scribe Attestation  By signing my name below, I, Deejay Peña attest that this documentation has been prepared under the direction and in the presence of Ignacio Montoya MD. All medical record entries made by the Scribe were at my direction or personally dictated by me. I have reviewed the chart and agree that the record accurately reflects my personal performance of the history, physical exam, discussion and plan.

## 2024-03-20 ENCOUNTER — TELEPHONE (OUTPATIENT)
Dept: UROLOGY | Facility: HOSPITAL | Age: 79
End: 2024-03-20
Payer: MEDICARE

## 2024-03-20 NOTE — TELEPHONE ENCOUNTER
"Patient called and she states she was having difficulty urinating. I spoke with patient and She states she had not gone to the bathroom since 6:30 pm the night prior. She was able to slowly empty her bladder this morning but she feels she is not emptying fully. She also states she \"feels the pessary is lower than it was yesterday\". Patient will be scheduled to see Dr. Montoya on Thursday 3/20/2024 for pessary evaluation.   "

## 2024-03-21 ENCOUNTER — OFFICE VISIT (OUTPATIENT)
Dept: UROLOGY | Facility: CLINIC | Age: 79
End: 2024-03-21
Payer: MEDICARE

## 2024-03-21 DIAGNOSIS — N81.10 FEMALE BLADDER PROLAPSE: ICD-10-CM

## 2024-03-21 DIAGNOSIS — N39.41 URGE INCONTINENCE: ICD-10-CM

## 2024-03-21 DIAGNOSIS — N81.4 CYSTOCELE WITH UTERINE DESCENSUS: ICD-10-CM

## 2024-03-21 DIAGNOSIS — N81.89 PELVIC FLOOR WEAKNESS: ICD-10-CM

## 2024-03-21 DIAGNOSIS — N39.3 FEMALE STRESS INCONTINENCE: Primary | ICD-10-CM

## 2024-03-21 DIAGNOSIS — Z46.89 FITTING AND ADJUSTMENT OF PESSARY: ICD-10-CM

## 2024-03-21 DIAGNOSIS — Z96.0 PRESENCE OF PESSARY: ICD-10-CM

## 2024-03-21 LAB
POC APPEARANCE, URINE: ABNORMAL
POC BILIRUBIN, URINE: NEGATIVE
POC BLOOD, URINE: ABNORMAL
POC COLOR, URINE: YELLOW
POC GLUCOSE, URINE: NEGATIVE MG/DL
POC KETONES, URINE: NEGATIVE MG/DL
POC LEUKOCYTES, URINE: ABNORMAL
POC NITRITE,URINE: NEGATIVE
POC PH, URINE: 7.5 PH
POC PROTEIN, URINE: ABNORMAL MG/DL
POC SPECIFIC GRAVITY, URINE: 1.02
POC UROBILINOGEN, URINE: 0.2 EU/DL

## 2024-03-21 PROCEDURE — 1160F RVW MEDS BY RX/DR IN RCRD: CPT | Performed by: OBSTETRICS & GYNECOLOGY

## 2024-03-21 PROCEDURE — 99214 OFFICE O/P EST MOD 30 MIN: CPT | Performed by: OBSTETRICS & GYNECOLOGY

## 2024-03-21 PROCEDURE — 1036F TOBACCO NON-USER: CPT | Performed by: OBSTETRICS & GYNECOLOGY

## 2024-03-21 PROCEDURE — 1159F MED LIST DOCD IN RCRD: CPT | Performed by: OBSTETRICS & GYNECOLOGY

## 2024-03-21 PROCEDURE — 81003 URINALYSIS AUTO W/O SCOPE: CPT | Performed by: OBSTETRICS & GYNECOLOGY

## 2024-03-21 NOTE — PATIENT INSTRUCTIONS
You are released from all postoperative restrictions.    Please follow-up on April 1 should you continue to have issues with your pessary.  Should you be overall satisfied, please follow-up no later than early September 2024 for pessary maintenance.    You have been provided information on Bulkamid and a mid urethral sling.  Should you wish to proceed with surgical intervention, these would be the most effective and safe options for you.  Please contact the clinic to discuss further.    [phone]

## 2024-03-21 NOTE — PROGRESS NOTES
Subjective   Patient ID: Manjula Ríos is a 78 y.o. female who presents for follow up.  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    The patient notes that she is having episodic rubbing and burning with the pessary. She denies any vaginal complaints, no abnormal bleeding or discharge. She reports that her leaking with movement has resolved with her pessary.     She denies any bowel related complaints, no fecal or flatal incontinence.    She has no other complaints.      From Previous note  78-year-old with symptomatic stage II anterior and apical pelvic organ prolapse, mild vaginal atrophy, and pelvic floor weakness having failed pessary management having undergone vaginal hysterectomy with bilateral salpingectomy, uterosacral ligament suspension, and perineorrhaphy 2/15/2024 with what postoperative urinary incontinence complaints presenting for cystoscopic evaluation today  03/19 / 2024.    She has no other complaints.     From Previous note  This visit was performed through telemedicine  78-year-old with symptomatic stage II anterior and apical pelvic organ prolapse, mild vaginal atrophy, and pelvic floor weakness having failed pessary management having undergone vaginal hysterectomy with bilateral salpingectomy, uterosacral ligament suspension, and perineorrhaphy 2/15/2024 with what appears to be occult stress urinary incontinence postoperative complaints presenting to discuss urodynamics    The patient continues to note worsening urinary incontinence. She denies any episodes of nocturia or  enuresis, she denies any terminal incontinence on the way to the bathroom in the morning. During the day every 2 hours she is wet. She appears to have spontaneous incontinence without even the sense of urge.     She denies any vaginal complaints, no abnormal bleeding or discharge.     She denies any bowel related complaints, no fecal or flatal incontinence.    She has no other complaints.     From Previous note  78-year-old with symptomatic stage II anterior and apical pelvic organ prolapse, mild vaginal atrophy, and pelvic floor weakness having failed pessary management having undergone vaginal hysterectomy with bilateral salpingectomy, uterosacral ligament suspension, and perineorrhaphy 2/15/2024 with what appears to be occult stress urinary incontinence postoperative complaints.     The patient continues to note worsening urinary incontinence. She appears to have spontaneous incontinence without even the sense of urge.  This appears to be stress-induced with movement.  She denies any regular episodes of nocturia but does wake up to empty her bladder.    She denies any vaginal complaints, no abnormal bleeding or discharge.     She denies any bowel related complaints, no fecal or flatal incontinence.    She has no other complaints.     From Previous note  78-year-old with symptomatic stage II anterior and apical pelvic organ prolapse, mild vaginal atrophy, and pelvic floor weakness having failed pessary management having undergone vaginal hysterectomy with bilateral salpingectomy, uterosacral ligament suspension, and perineorrhaphy 2/15/2024.     The patient  presents with worsening urinary incontinence. She utilized Oxybutynin but noted terrible dry mouth and throat complains. She also complained of  dizziness. She appears to have spontaneous incontinence without even the sense of urge.  This appears to be stress-induced with movement.  She denies any regular episodes of nocturia but does wake up to empty her  bladder. She voids every 2-3 hours and yet has episodes of incontinence. UA shows small leukocytes. PVR was 0.    She denies any bowel related complaints, no fecal or flatal incontinence.    She denies any vaginal complaints, no abnormal bleeding or discharge. She appears to be doing well from the surgery standpoint, she states that her pain is controlled.     She has no other complaints.    From Previous note   78-year-old with symptomatic stage II anterior and apical pelvic organ prolapse, mild vaginal atrophy, and pelvic floor weakness having failed pessary management having undergone vaginal hysterectomy with bilateral salpingectomy, uterosacral ligament suspension, and perineorrhaphy 2/15/2024.    The patient denies any vaginal complaints.  She denies any vaginal bulges.  She is having 2-3 regular soft bowel movements daily with the assistance of daily MiraLAX.  She denies any fecal incontinence.    However she has noted worsening urgency and frequency.  She notes episodic urinary leakage associated with and without urgency.  She denies any stress urinary incontinence complaints.  Urinalysis today appears negative.    The patient presents to discuss options for her pelvic organ prolapse. Surgical correction was discussed at length.    She continues to be bothered by her bulge.     She is continuing her vaginal estrogen therapy. She denies any vaginal discharge concerns at this time.     She denies any bowel related complaints, no fecal or flatal incontinence.     She has no other complaints.    From Previous note  78-year-old with symptomatic stage II anterior and apical pelvic organ prolapse, mild vaginal atrophy, and pelvic floor weakness having been fitted with a #4 ring with support and knob 5/16/2023 with persistent vaginal versus urinary leakage concerns having had her pessary removed 8/1/2023.    The patient has noted episodic urinary urgency with her first void of the day.  She notes daytime frequency  roughly every 3 hours.  She denies any stress urinary incontinence.  She denies any significant vaginal bulge complaints.  She is utilizing her vaginal estrogen therapy.  She denies any bowel related complaints.    She has no other complaints.    From Previous note  77-year-old with symptomatic stage II anterior and apical pelvic organ prolapse, mild vaginal atrophy, and pelvic floor weakness having been fitted with a #4 ring with support and knob 5/16/2023 with persistent vaginal versus urinary leakage concerns having had her pessary removed 8/1/2023 presenting for follow up.     The patient appears to be having excellent results without the pessary removed. She denies any abnormal vaginal discharge or bleeding. She only notes a vaginal bulge when doing jumping jacks or with a heavy cough. However this is easily reduced and this is not bothersome to her at this time.     She denies any worsening urinary urgency or frequency. She does have to rush to bathroom for her first void in the morning but that is not bothersome. She denies any episodes of nocturia or enuresis. She denies any UTI like symptoms.      She is continuing her vaginal estrogen therapy. She denies any vaginal discharge concerns at this time.     She denies any bowel related complaints, no fecal or flatal incontinence.     She has no other complaints.     From previous note   77-year-old with symptomatic stage II anterior and apical pelvic organ prolapse, mild vaginal atrophy, and pelvic floor weakness having been fitted with a #4 ring with support and knob 5/16/2023 with persistent vaginal versus urinary leakage concerns.     The patient reports increasing urinary urgency and frequency since the last visit. She states that she attempted to remove the pessary and noticed that she had increased leaking since then.      She denies any vaginal spotting. She states that she has been compliant with the topical estrogen and Trimo-Cornejo.      She denies  "constipation or any bowel-related symptoms.     She has no other complaints.      From previous note This visit was performed through telemedicine  77-year-old with symptomatic stage II anterior and apical pelvic organ prolapse, mild vaginal atrophy, and pelvic floor weakness having been fitted with a #4 ring with support and knob 5/16/2023 with persistent vaginal versus urinary leakage concerns.     Reports resolution of her malodorous urine s/p Trimo-Cornejo. Complains of persistent \"leakage\" that is vaginal vs urinary in nature, but she is not definitely sure, described as being more of a sensation. Last UDS was in June 2023. Reviewed UDS results again today, including being negative for leakage. Discussed option of removing her pessary at home vs in-office. She will schedule an appointment to have this removed.      She has no other complaints     From previous note  77-year-old with symptomatic stage II anterior and apical pelvic organ prolapse, mild vaginal atrophy, and pelvic floor weakness having been fitted with a #4 ring with support and knob 5/16/2023 with persistent urinary incontinence complaints.     The patient continues to note \"leakage\". She is wearing a pad and notes a \"yellowish\" leakage. She has proceeded with a phenazopyridine challenge and felt that this was bright orange in color and urine in nature. We discussed at length today her lower urinary tract symptoms. She denies any urgency or frequency. She notes urinating roughly every 4-5 hours during the daytime and notes 1 episode of nocturia. She denies any stress urinary incontinence. She is overall very satisfied with her pessary noting complete resolution of her vaginal bulge complaints. She is utilizing her vaginal estrogen therapy.     We discussed at length today my concerns that her discharge is vaginal in nature as opposed to urine.     She has no other complaints.     From previous note  This visit was performed through " telemedicine  77-year-old with symptomatic stage II anterior and apical pelvic organ prolapse, mild vaginal atrophy, and pelvic floor weakness having been fitted with a #4 ring with support and knob 5/16/2023.     The patient was utilizing the Oxytrol patch but noted severe fatigue and pain in her lower extremities. She states her symptoms resolved since she stopped utilizing it. It was noted that she has yellow urine on her pad with her phenazopyridine challenge. She denies any urgency but continues to leak on movement and laughing, coughing and sneezing. Bladder sling Surgery was discussed at length.      She denies any vaginal complaints, no abnormal vaginal bleeding or discharge.     She denies any bowel related complaints, no fecal or flatal incontinence.     She has no other complaints.     From previous note  This visit was performed through telemedicine  77-year-old with symptomatic stage II anterior and apical pelvic organ prolapse, mild vaginal atrophy, and pelvic floor weakness having been fitted with a #4 ring with support and knob 5/16/2023.     The patient presents to discuss her UDS test results, which demonstrates no filling defect, no bladder spasm, mild sensitivity, no obstruction and adequate emptying. The test does not demonstrate any signs of stress incontinence. she voids 4-5 times during the day but leaks in between and utilizes pads throughout the day, as she leaks with movement and at the sound of water. She denies any episodes of nocturia , however her pad is moist when she wakes up. She denies any UTI like symptoms.      She denies any vaginal complaints, no abnormal vaginal bleeding or discharge.     She denies any bowel related complaints, no fecal or flatal incontinence.     She has no other complaints.     From previous note  77-year-old with symptomatic stage II anterior and apical pelvic organ prolapse, mild vaginal atrophy, and pelvic floor weakness having been fitted with a #4 ring  with support and knob 5/16/2023.     The patient reports of noting moderate incontinence after the pessary placed followed by a gush on sneezing. She voids 3-4 times during the day but leaks in between and utilizes pads throughout the day, as she leaks with movement. She denies any episodes of nocturia , however her pad is moist when she wakes up. She denies any UTI like symptoms.      She does note near resolution of her bulge complaints with the pessary in place, however she is bothered by her lower urinary tract complaints. She denies any abnormal vaginal bleeding or discharge. She is utilizing the vaginal estrogen cream.     She denies any bowel related complaints, no fecal or flatal incontinence.     She has no other complaints.     From previous note  77-year-old with symptomatic stage II anterior and apical pelvic organ prolapse, mild vaginal atrophy, and pelvic floor weakness having been fitted with a #4 ring with support 4/25/2023.     The patient is satisfied from the pessary standpoint. She denies any vaginal complaints, no abnormal vaginal bleeding or discharge. She is utilizing the vaginal estrogen cream.     She complaints on leaking when she stands from sitting position. She does note leaking on laughing, cough or sneezing. She is noting 2-3 episodes of nocturia with urgency.     She denies any bowel related complaints, she did feel the pessary was pushing on her bowels but no fecal or flatal incontinence.     She has no other complaints.        From previous note  77-year-old with symptomatic stage II anterior and apical pelvic organ prolapse, mild vaginal atrophy, and pelvic floor weakness presenting for pessary fitting today 04/25/23.     She is utilizing the vaginal estrogen cream.      She has no other complaints.           From previous note  77- year-old patient presenting as a referral from Dr. Isbell with complaints of pelvic organ prolapse.     The patient states she noticed a vaginal bulge  last night in the shower, she denies any pressure or pulling sensation. She also denies any vaginal bleeding or discharge. She is sexually active and denies any vaginal complaints. She was previously seen by Dr. Isbell and Dr. Sepulveda for he gynecological care.     She also note urinary urgency and incontinence. She denies leaking on laughing, cough or sneezing. She denies any nocturia but denies any enuresis. She voids every hour during the day. She denies any history of nephrolithiasis, gross hematuria or chronic recurrent UTIs.      She complaints of constipation but has a bowel movement every morning, she has utilized MiraLAX in the past. She denies any fecal or flatal incontinence.      She has no other complaints.  Review of Systems    Objective   Physical Exam  Constitutional: No fever, No chills and No fatigue.   Eyes: No vision problems and No dryness of the eyes.   ENT: No dry mouth, No hearing loss and No nosebleeds.   Cardiovascular: No chest pain, No palpitations and No orthopnea.   Respiratory: No shortness of breath, No cough and No wheezing.   Gastrointestinal: No abdominal pain, No constipation, No nausea, No diarrhea, No vomiting and No melena.   Genitourinary: As noted in HPI.   Musculoskeletal: No back pain, No myalgias, No muscle weakness, No joint swelling and No leg edema.   Integumentary: No rashes, No skin lesion and No itching.   Neurological: No headache, No numbness and No dizziness.   Psychiatric: No sleep disturbances, No anxiety and No depression.   Endocrine: No hot flashes, No loss of hair and No hirsutism.   Hematologic/Lymphatic: No swollen glands, No tendency for easy bleeding and No tendency for easy bruising.   All other systems have been reviewed and are negative for complaint.     PHYSICAL EXAMINATION:  No LMP recorded.  There is no height or weight on file to calculate BMI.  There were no vitals taken for this visit.  General Appearance: well appearing  Neuro: Alert and  oriented   HEENT: mucous membranes moist, neck supple  Resp: No respiratory distress, normal work of breathing  MSK: normal range of motion, gait appropriate    The patient's #2 ring with support and knob was removed with lidocaine gel.  A #2 incontinence ring and a #3 incontinence ring was then placed with perceived leaking and persistent symptoms.  The patient's #2 ring with support and knob was replaced.    Assessment/Plan     78-year-old with symptomatic stage II anterior and apical pelvic organ prolapse, mild vaginal atrophy, and pelvic floor weakness having failed pessary management having undergone vaginal hysterectomy with bilateral salpingectomy, uterosacral ligament suspension, and perineorrhaphy 2/15/2024 with what postoperative urinary incontinence complaints having undergone normal cystoscopic evaluation and #2 ring with support and knob pessary was fitted 03/19 /2024.       #1 patient is overall very satisfied from a prolapse standpoint.  She is released from all postoperative restrictions.    2. We have previously discussed the patient's vaginal atrophy. She will continue her vaginal estrogen therapy 3 times a week.       3. We again discussed her acute worsening incontinence complaints following her surgery.  This appears to be stress urinary incontinence by history.  CST 2/27/2024 and 3/11/2024 was negative.  She is emptying appropriately.  She had significant side effects associated with oxybutynin and has since stopped this medication.  Urodynamics was overall reassuring with a voided volume of 432 cc with no evidence of genuine stress urinary incontinence or DO.  We discussed the confusing nature of her complaints and her reassuring urodynamic findings.  Cystoscopic evaluation today 3/19/2024 demonstrated no concerns for a fistula or other bladder wall abnormalities explaining her incontinence complaints while standing and moving.  The patient was fitted with a #2 ring with support and knob  pessary today 3/19/2024.  She has noted resolution of her leaking complaints with this in place.  We discussed the diagnosis of occult stress urinary incontinence.  We discussed continuing her pessary moving forward.  A #2 and #3 incontinence ring was attempted today 3/21/2024 but was not as successful as her #2 ring with support and knob.  We discussed continuing her vaginal estrogen therapy and the possibility of Bulkamid and mid urethral sling.  The patient was provided information on these surgical options.  She is following up with her son in 2 weeks.    4. The patient will follow-up in 1-2 weeks to discuss her pessary and lower urinary tract complaints and readdress her pessary versus surgical options moving forward.     LAUREN Montoya MD     Scribe Attestation  By signing my name below, I, Deejay Peña attest that this documentation has been prepared under the direction and in the presence of Ignacio Montoya MD. All medical record entries made by the Scribe were at my direction or personally dictated by me. I have reviewed the chart and agree that the record accurately reflects my personal performance of the history, physical exam, discussion and plan.

## 2024-03-27 NOTE — PROGRESS NOTES
Subjective   Patient ID: Manjula Ríos is a 78 y.o. female who presents for follow up.  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    The patient has been doing well, she does continue to note the pessary rubbing and some mild rectal discomfort with certain movements however this is not bothersome for her.  She denies any vaginal complaints, no abnormal bleeding or discharge.     She does note worsening urinary urgency, she voids 5-6 times during the day but has a sense of urgency with positional changes. She denies any UTI like symptoms.    She denies any bowel related complaints, no fecal or flatal incontinence.    She has no other complaints.    From Previous note  78-year-old with symptomatic stage II anterior and apical pelvic organ prolapse, mild vaginal atrophy, and pelvic floor weakness having failed pessary management having undergone vaginal hysterectomy with bilateral salpingectomy, uterosacral ligament suspension, and perineorrhaphy 2/15/2024 with what postoperative urinary incontinence complaints having undergone normal cystoscopic evaluation  and #2 ring with support and knob pessary was fitted 03/19 / 2024.    The patient notes that she is having episodic rubbing and burning with the pessary. She denies any vaginal complaints, no abnormal bleeding or discharge. She reports that her leaking with movement has resolved with her pessary.     She denies any bowel related complaints, no fecal or flatal incontinence.    She has no other complaints.      From Previous note  78-year-old with  symptomatic stage II anterior and apical pelvic organ prolapse, mild vaginal atrophy, and pelvic floor weakness having failed pessary management having undergone vaginal hysterectomy with bilateral salpingectomy, uterosacral ligament suspension, and perineorrhaphy 2/15/2024 with what postoperative urinary incontinence complaints presenting for cystoscopic evaluation today  03/19 / 2024.    She has no other complaints.     From Previous note  This visit was performed through telemedicine  78-year-old with symptomatic stage II anterior and apical pelvic organ prolapse, mild vaginal atrophy, and pelvic floor weakness having failed pessary management having undergone vaginal hysterectomy with bilateral salpingectomy, uterosacral ligament suspension, and perineorrhaphy 2/15/2024 with what appears to be occult stress urinary incontinence postoperative complaints presenting to discuss urodynamics    The patient continues to note worsening urinary incontinence. She denies any episodes of nocturia or enuresis, she denies any terminal incontinence on the way to the bathroom in the morning. During the day every 2 hours she is wet. She appears to have spontaneous incontinence without even the sense of urge.     She denies any vaginal complaints, no abnormal bleeding or discharge.     She denies any bowel related complaints, no fecal or flatal incontinence.    She has no other complaints.     From Previous note  78-year-old with symptomatic stage II anterior and apical pelvic organ prolapse, mild vaginal atrophy, and pelvic floor weakness having failed pessary management having undergone vaginal hysterectomy with bilateral salpingectomy, uterosacral ligament suspension, and perineorrhaphy 2/15/2024 with what appears to be occult stress urinary incontinence postoperative complaints.     The patient continues to note worsening urinary incontinence. She appears to have spontaneous incontinence without even the sense of urge.  This  appears to be stress-induced with movement.  She denies any regular episodes of nocturia but does wake up to empty her bladder.    She denies any vaginal complaints, no abnormal bleeding or discharge.     She denies any bowel related complaints, no fecal or flatal incontinence.    She has no other complaints.     From Previous note  78-year-old with symptomatic stage II anterior and apical pelvic organ prolapse, mild vaginal atrophy, and pelvic floor weakness having failed pessary management having undergone vaginal hysterectomy with bilateral salpingectomy, uterosacral ligament suspension, and perineorrhaphy 2/15/2024.     The patient  presents with worsening urinary incontinence. She utilized Oxybutynin but noted terrible dry mouth and throat complains. She also complained of  dizziness. She appears to have spontaneous incontinence without even the sense of urge.  This appears to be stress-induced with movement.  She denies any regular episodes of nocturia but does wake up to empty her bladder. She voids every 2-3 hours and yet has episodes of incontinence. UA shows small leukocytes. PVR was 0.    She denies any bowel related complaints, no fecal or flatal incontinence.    She denies any vaginal complaints, no abnormal bleeding or discharge. She appears to be doing well from the surgery standpoint, she states that her pain is controlled.     She has no other complaints.    From Previous note   78-year-old with symptomatic stage II anterior and apical pelvic organ prolapse, mild vaginal atrophy, and pelvic floor weakness having failed pessary management having undergone vaginal hysterectomy with bilateral salpingectomy, uterosacral ligament suspension, and perineorrhaphy 2/15/2024.    The patient denies any vaginal complaints.  She denies any vaginal bulges.  She is having 2-3 regular soft bowel movements daily with the assistance of daily MiraLAX.  She denies any fecal incontinence.    However she has noted  worsening urgency and frequency.  She notes episodic urinary leakage associated with and without urgency.  She denies any stress urinary incontinence complaints.  Urinalysis today appears negative.    The patient presents to discuss options for her pelvic organ prolapse. Surgical correction was discussed at length.    She continues to be bothered by her bulge.     She is continuing her vaginal estrogen therapy. She denies any vaginal discharge concerns at this time.     She denies any bowel related complaints, no fecal or flatal incontinence.     She has no other complaints.    From Previous note  78-year-old with symptomatic stage II anterior and apical pelvic organ prolapse, mild vaginal atrophy, and pelvic floor weakness having been fitted with a #4 ring with support and knob 5/16/2023 with persistent vaginal versus urinary leakage concerns having had her pessary removed 8/1/2023.    The patient has noted episodic urinary urgency with her first void of the day.  She notes daytime frequency roughly every 3 hours.  She denies any stress urinary incontinence.  She denies any significant vaginal bulge complaints.  She is utilizing her vaginal estrogen therapy.  She denies any bowel related complaints.    She has no other complaints.    From Previous note  77-year-old with symptomatic stage II anterior and apical pelvic organ prolapse, mild vaginal atrophy, and pelvic floor weakness having been fitted with a #4 ring with support and knob 5/16/2023 with persistent vaginal versus urinary leakage concerns having had her pessary removed 8/1/2023 presenting for follow up.     The patient appears to be having excellent results without the pessary removed. She denies any abnormal vaginal discharge or bleeding. She only notes a vaginal bulge when doing jumping jacks or with a heavy cough. However this is easily reduced and this is not bothersome to her at this time.     She denies any worsening urinary urgency or frequency.  "She does have to rush to bathroom for her first void in the morning but that is not bothersome. She denies any episodes of nocturia or enuresis. She denies any UTI like symptoms.      She is continuing her vaginal estrogen therapy. She denies any vaginal discharge concerns at this time.     She denies any bowel related complaints, no fecal or flatal incontinence.     She has no other complaints.     From previous note   77-year-old with symptomatic stage II anterior and apical pelvic organ prolapse, mild vaginal atrophy, and pelvic floor weakness having been fitted with a #4 ring with support and knob 5/16/2023 with persistent vaginal versus urinary leakage concerns.     The patient reports increasing urinary urgency and frequency since the last visit. She states that she attempted to remove the pessary and noticed that she had increased leaking since then.      She denies any vaginal spotting. She states that she has been compliant with the topical estrogen and Trimo-Cornejo.      She denies constipation or any bowel-related symptoms.     She has no other complaints.      From previous note This visit was performed through telemedicine  77-year-old with symptomatic stage II anterior and apical pelvic organ prolapse, mild vaginal atrophy, and pelvic floor weakness having been fitted with a #4 ring with support and knob 5/16/2023 with persistent vaginal versus urinary leakage concerns.     Reports resolution of her malodorous urine s/p Trimo-Cornejo. Complains of persistent \"leakage\" that is vaginal vs urinary in nature, but she is not definitely sure, described as being more of a sensation. Last UDS was in June 2023. Reviewed UDS results again today, including being negative for leakage. Discussed option of removing her pessary at home vs in-office. She will schedule an appointment to have this removed.      She has no other complaints     From previous note  77-year-old with symptomatic stage II anterior and apical pelvic " "organ prolapse, mild vaginal atrophy, and pelvic floor weakness having been fitted with a #4 ring with support and knob 5/16/2023 with persistent urinary incontinence complaints.     The patient continues to note \"leakage\". She is wearing a pad and notes a \"yellowish\" leakage. She has proceeded with a phenazopyridine challenge and felt that this was bright orange in color and urine in nature. We discussed at length today her lower urinary tract symptoms. She denies any urgency or frequency. She notes urinating roughly every 4-5 hours during the daytime and notes 1 episode of nocturia. She denies any stress urinary incontinence. She is overall very satisfied with her pessary noting complete resolution of her vaginal bulge complaints. She is utilizing her vaginal estrogen therapy.     We discussed at length today my concerns that her discharge is vaginal in nature as opposed to urine.     She has no other complaints.     From previous note  This visit was performed through telemedicine  77-year-old with symptomatic stage II anterior and apical pelvic organ prolapse, mild vaginal atrophy, and pelvic floor weakness having been fitted with a #4 ring with support and knob 5/16/2023.     The patient was utilizing the Oxytrol patch but noted severe fatigue and pain in her lower extremities. She states her symptoms resolved since she stopped utilizing it. It was noted that she has yellow urine on her pad with her phenazopyridine challenge. She denies any urgency but continues to leak on movement and laughing, coughing and sneezing. Bladder sling Surgery was discussed at length.      She denies any vaginal complaints, no abnormal vaginal bleeding or discharge.     She denies any bowel related complaints, no fecal or flatal incontinence.     She has no other complaints.     From previous note  This visit was performed through telemedicine  77-year-old with symptomatic stage II anterior and apical pelvic organ prolapse, mild " vaginal atrophy, and pelvic floor weakness having been fitted with a #4 ring with support and knob 5/16/2023.     The patient presents to discuss her UDS test results, which demonstrates no filling defect, no bladder spasm, mild sensitivity, no obstruction and adequate emptying. The test does not demonstrate any signs of stress incontinence. she voids 4-5 times during the day but leaks in between and utilizes pads throughout the day, as she leaks with movement and at the sound of water. She denies any episodes of nocturia , however her pad is moist when she wakes up. She denies any UTI like symptoms.      She denies any vaginal complaints, no abnormal vaginal bleeding or discharge.     She denies any bowel related complaints, no fecal or flatal incontinence.     She has no other complaints.     From previous note  77-year-old with symptomatic stage II anterior and apical pelvic organ prolapse, mild vaginal atrophy, and pelvic floor weakness having been fitted with a #4 ring with support and knob 5/16/2023.     The patient reports of noting moderate incontinence after the pessary placed followed by a gush on sneezing. She voids 3-4 times during the day but leaks in between and utilizes pads throughout the day, as she leaks with movement. She denies any episodes of nocturia , however her pad is moist when she wakes up. She denies any UTI like symptoms.      She does note near resolution of her bulge complaints with the pessary in place, however she is bothered by her lower urinary tract complaints. She denies any abnormal vaginal bleeding or discharge. She is utilizing the vaginal estrogen cream.     She denies any bowel related complaints, no fecal or flatal incontinence.     She has no other complaints.     From previous note  77-year-old with symptomatic stage II anterior and apical pelvic organ prolapse, mild vaginal atrophy, and pelvic floor weakness having been fitted with a #4 ring with support 4/25/2023.      The patient is satisfied from the pessary standpoint. She denies any vaginal complaints, no abnormal vaginal bleeding or discharge. She is utilizing the vaginal estrogen cream.     She complaints on leaking when she stands from sitting position. She does note leaking on laughing, cough or sneezing. She is noting 2-3 episodes of nocturia with urgency.     She denies any bowel related complaints, she did feel the pessary was pushing on her bowels but no fecal or flatal incontinence.     She has no other complaints.        From previous note  77-year-old with symptomatic stage II anterior and apical pelvic organ prolapse, mild vaginal atrophy, and pelvic floor weakness presenting for pessary fitting today 04/25/23.     She is utilizing the vaginal estrogen cream.      She has no other complaints.           From previous note  77- year-old patient presenting as a referral from Dr. Isbell with complaints of pelvic organ prolapse.     The patient states she noticed a vaginal bulge last night in the shower, she denies any pressure or pulling sensation. She also denies any vaginal bleeding or discharge. She is sexually active and denies any vaginal complaints. She was previously seen by Dr. Isbell and Dr. Sepulveda for he gynecological care.     She also note urinary urgency and incontinence. She denies leaking on laughing, cough or sneezing. She denies any nocturia but denies any enuresis. She voids every hour during the day. She denies any history of nephrolithiasis, gross hematuria or chronic recurrent UTIs.      She complaints of constipation but has a bowel movement every morning, she has utilized MiraLAX in the past. She denies any fecal or flatal incontinence.      She has no other complaints.  Review of Systems    Objective   Physical Exam  Constitutional: No fever, No chills and No fatigue.   Eyes: No vision problems and No dryness of the eyes.   ENT: No dry mouth, No hearing loss and No nosebleeds.    Cardiovascular: No chest pain, No palpitations and No orthopnea.   Respiratory: No shortness of breath, No cough and No wheezing.   Gastrointestinal: No abdominal pain, No constipation, No nausea, No diarrhea, No vomiting and No melena.   Genitourinary: As noted in HPI.   Musculoskeletal: No back pain, No myalgias, No muscle weakness, No joint swelling and No leg edema.   Integumentary: No rashes, No skin lesion and No itching.   Neurological: No headache, No numbness and No dizziness.   Psychiatric: No sleep disturbances, No anxiety and No depression.   Endocrine: No hot flashes, No loss of hair and No hirsutism.   Hematologic/Lymphatic: No swollen glands, No tendency for easy bleeding and No tendency for easy bruising.   All other systems have been reviewed and are negative for complaint.     PHYSICAL EXAMINATION:  No LMP recorded.  There is no height or weight on file to calculate BMI.  There were no vitals taken for this visit.  General Appearance: well appearing  Neuro: Alert and oriented   HEENT: mucous membranes moist, neck supple  Resp: No respiratory distress, normal work of breathing  MSK: normal range of motion, gait appropriate    Assessment/Plan     78-year-old with symptomatic stage II anterior and apical pelvic organ prolapse, mild vaginal atrophy, and pelvic floor weakness having failed pessary management having undergone vaginal hysterectomy with bilateral salpingectomy, uterosacral ligament suspension, and perineorrhaphy 2/15/2024 with what postoperative urinary incontinence complaints having undergone normal cystoscopic evaluation and #2 ring with support and knob pessary was fitted 03/19 /2024.       #1 patient is overall very satisfied from a prolapse standpoint.  She is released from all postoperative restrictions.    2. We have previously discussed the patient's vaginal atrophy. She will continue her vaginal estrogen therapy 3 times a week.       3. We again discussed her acute worsening  incontinence complaints following her surgery.  This appears to be stress urinary incontinence by history.  CST 2/27/2024 and 3/11/2024 was negative.  She is emptying appropriately.  She had significant side effects associated with oxybutynin and has since stopped this medication.  Urodynamics was overall reassuring with a voided volume of 432 cc with no evidence of genuine stress urinary incontinence or DO.  We discussed the confusing nature of her complaints and her reassuring urodynamic findings.  Cystoscopic evaluation today 3/19/2024 demonstrated no concerns for a fistula or other bladder wall abnormalities explaining her incontinence complaints while standing and moving.  The patient was fitted with a #2 ring with support and knob pessary today 3/19/2024.  She has noted resolution of her leaking complaints with this in place.  We discussed the diagnosis of occult stress urinary incontinence.  We discussed continuing her pessary moving forward.  A #2 and #3 incontinence ring was previously attempted 3/21/2024 but was not as successful as her #2 ring with support and knob.  We discussed continuing her vaginal estrogen therapy and the possibility of Bulkamid and mid urethral sling.  We discussed at length today the various risks and benefits of these procedures.  The patient wishes to proceed with Bulkamid therapy and we scheduled at her earliest convenience.    4. The patient will be scheduled for Bulkamid at her earliest convenience.     LAUREN Montoya MD     Scribe Attestation  By signing my name below, [employer] Scribe attest that this documentation has been prepared under the direction and in the presence of Ignacio Montoya MD. All medical record entries made by the Scribe were at my direction or personally dictated by me. I have reviewed the chart and agree that the record accurately reflects my personal performance of the history, physical exam, discussion and plan.

## 2024-04-01 ENCOUNTER — OFFICE VISIT (OUTPATIENT)
Dept: UROLOGY | Facility: CLINIC | Age: 79
End: 2024-04-01
Payer: MEDICARE

## 2024-04-01 VITALS
BODY MASS INDEX: 20.32 KG/M2 | WEIGHT: 98.9 LBS | HEART RATE: 67 BPM | DIASTOLIC BLOOD PRESSURE: 74 MMHG | SYSTOLIC BLOOD PRESSURE: 118 MMHG

## 2024-04-01 DIAGNOSIS — N81.4 CYSTOCELE WITH UTERINE DESCENSUS: ICD-10-CM

## 2024-04-01 DIAGNOSIS — N81.10 FEMALE BLADDER PROLAPSE: ICD-10-CM

## 2024-04-01 DIAGNOSIS — N39.3 FEMALE STRESS INCONTINENCE: ICD-10-CM

## 2024-04-01 DIAGNOSIS — Z96.0 PRESENCE OF PESSARY: Primary | ICD-10-CM

## 2024-04-01 DIAGNOSIS — N81.89 PELVIC FLOOR WEAKNESS: ICD-10-CM

## 2024-04-01 DIAGNOSIS — Z46.89 FITTING AND ADJUSTMENT OF PESSARY: ICD-10-CM

## 2024-04-01 DIAGNOSIS — N39.41 URGE INCONTINENCE: ICD-10-CM

## 2024-04-01 PROCEDURE — 1159F MED LIST DOCD IN RCRD: CPT | Performed by: OBSTETRICS & GYNECOLOGY

## 2024-04-01 PROCEDURE — 99213 OFFICE O/P EST LOW 20 MIN: CPT | Performed by: OBSTETRICS & GYNECOLOGY

## 2024-04-01 PROCEDURE — 1160F RVW MEDS BY RX/DR IN RCRD: CPT | Performed by: OBSTETRICS & GYNECOLOGY

## 2024-04-01 RX ORDER — SODIUM CHLORIDE, SODIUM LACTATE, POTASSIUM CHLORIDE, CALCIUM CHLORIDE 600; 310; 30; 20 MG/100ML; MG/100ML; MG/100ML; MG/100ML
100 INJECTION, SOLUTION INTRAVENOUS CONTINUOUS
Status: CANCELLED | OUTPATIENT
Start: 2024-04-01

## 2024-04-01 RX ORDER — CEFAZOLIN SODIUM 2 G/100ML
2 INJECTION, SOLUTION INTRAVENOUS ONCE
Status: CANCELLED | OUTPATIENT
Start: 2024-04-01 | End: 2024-04-01

## 2024-04-23 ENCOUNTER — TELEMEDICINE CLINICAL SUPPORT (OUTPATIENT)
Dept: PREADMISSION TESTING | Facility: HOSPITAL | Age: 79
End: 2024-04-23
Payer: MEDICARE

## 2024-04-23 DIAGNOSIS — N39.3 FEMALE STRESS INCONTINENCE: ICD-10-CM

## 2024-04-23 RX ORDER — FLUTICASONE PROPIONATE 50 MCG
1 SPRAY, SUSPENSION (ML) NASAL DAILY
COMMUNITY

## 2024-04-25 ENCOUNTER — APPOINTMENT (OUTPATIENT)
Dept: ENDOCRINOLOGY | Facility: CLINIC | Age: 79
End: 2024-04-25
Payer: MEDICARE

## 2024-04-30 ENCOUNTER — PRE-ADMISSION TESTING (OUTPATIENT)
Dept: PREADMISSION TESTING | Facility: HOSPITAL | Age: 79
End: 2024-04-30
Payer: MEDICARE

## 2024-04-30 ENCOUNTER — LAB (OUTPATIENT)
Dept: LAB | Facility: LAB | Age: 79
End: 2024-04-30
Payer: MEDICARE

## 2024-04-30 VITALS
WEIGHT: 97 LBS | RESPIRATION RATE: 18 BRPM | BODY MASS INDEX: 20.36 KG/M2 | OXYGEN SATURATION: 97 % | TEMPERATURE: 98.4 F | HEART RATE: 83 BPM | HEIGHT: 58 IN | SYSTOLIC BLOOD PRESSURE: 116 MMHG | DIASTOLIC BLOOD PRESSURE: 68 MMHG

## 2024-04-30 DIAGNOSIS — Z01.818 PRE-OP TESTING: ICD-10-CM

## 2024-04-30 DIAGNOSIS — Z01.818 PRE-OP TESTING: Primary | ICD-10-CM

## 2024-04-30 LAB
ALBUMIN SERPL BCP-MCNC: 4.1 G/DL (ref 3.4–5)
ALP SERPL-CCNC: 55 U/L (ref 33–136)
ALT SERPL W P-5'-P-CCNC: 23 U/L (ref 7–45)
ANION GAP SERPL CALC-SCNC: 11 MMOL/L (ref 10–20)
APPEARANCE UR: CLEAR
AST SERPL W P-5'-P-CCNC: 28 U/L (ref 9–39)
BASOPHILS # BLD AUTO: 0.05 X10*3/UL (ref 0–0.1)
BASOPHILS NFR BLD AUTO: 0.6 %
BILIRUB SERPL-MCNC: 0.6 MG/DL (ref 0–1.2)
BILIRUB UR STRIP.AUTO-MCNC: NEGATIVE MG/DL
BUN SERPL-MCNC: 30 MG/DL (ref 6–23)
CALCIUM SERPL-MCNC: 9.4 MG/DL (ref 8.6–10.3)
CHLORIDE SERPL-SCNC: 99 MMOL/L (ref 98–107)
CO2 SERPL-SCNC: 27 MMOL/L (ref 21–32)
COLOR UR: ABNORMAL
CREAT SERPL-MCNC: 0.65 MG/DL (ref 0.5–1.05)
EGFRCR SERPLBLD CKD-EPI 2021: >90 ML/MIN/1.73M*2
EOSINOPHIL # BLD AUTO: 0.12 X10*3/UL (ref 0–0.4)
EOSINOPHIL NFR BLD AUTO: 1.4 %
ERYTHROCYTE [DISTWIDTH] IN BLOOD BY AUTOMATED COUNT: 13 % (ref 11.5–14.5)
GLUCOSE SERPL-MCNC: 83 MG/DL (ref 74–99)
GLUCOSE UR STRIP.AUTO-MCNC: NORMAL MG/DL
HCT VFR BLD AUTO: 39.4 % (ref 36–46)
HGB BLD-MCNC: 12.8 G/DL (ref 12–16)
IMM GRANULOCYTES # BLD AUTO: 0.04 X10*3/UL (ref 0–0.5)
IMM GRANULOCYTES NFR BLD AUTO: 0.5 % (ref 0–0.9)
KETONES UR STRIP.AUTO-MCNC: NEGATIVE MG/DL
LEUKOCYTE ESTERASE UR QL STRIP.AUTO: ABNORMAL
LYMPHOCYTES # BLD AUTO: 1.8 X10*3/UL (ref 0.8–3)
LYMPHOCYTES NFR BLD AUTO: 21.4 %
MCH RBC QN AUTO: 29.6 PG (ref 26–34)
MCHC RBC AUTO-ENTMCNC: 32.5 G/DL (ref 32–36)
MCV RBC AUTO: 91 FL (ref 80–100)
MONOCYTES # BLD AUTO: 0.71 X10*3/UL (ref 0.05–0.8)
MONOCYTES NFR BLD AUTO: 8.4 %
MUCOUS THREADS #/AREA URNS AUTO: ABNORMAL /LPF
NEUTROPHILS # BLD AUTO: 5.69 X10*3/UL (ref 1.6–5.5)
NEUTROPHILS NFR BLD AUTO: 67.7 %
NITRITE UR QL STRIP.AUTO: NEGATIVE
NRBC BLD-RTO: 0 /100 WBCS (ref 0–0)
PH UR STRIP.AUTO: 6 [PH]
PLATELET # BLD AUTO: 219 X10*3/UL (ref 150–450)
POTASSIUM SERPL-SCNC: 4.3 MMOL/L (ref 3.5–5.3)
PROT SERPL-MCNC: 7 G/DL (ref 6.4–8.2)
PROT UR STRIP.AUTO-MCNC: NEGATIVE MG/DL
RBC # BLD AUTO: 4.33 X10*6/UL (ref 4–5.2)
RBC # UR STRIP.AUTO: NEGATIVE /UL
RBC #/AREA URNS AUTO: ABNORMAL /HPF
SODIUM SERPL-SCNC: 133 MMOL/L (ref 136–145)
SP GR UR STRIP.AUTO: 1.02
SQUAMOUS #/AREA URNS AUTO: ABNORMAL /HPF
UROBILINOGEN UR STRIP.AUTO-MCNC: NORMAL MG/DL
WBC # BLD AUTO: 8.4 X10*3/UL (ref 4.4–11.3)
WBC #/AREA URNS AUTO: ABNORMAL /HPF

## 2024-04-30 PROCEDURE — 85025 COMPLETE CBC W/AUTO DIFF WBC: CPT

## 2024-04-30 PROCEDURE — 36415 COLL VENOUS BLD VENIPUNCTURE: CPT

## 2024-04-30 PROCEDURE — 93005 ELECTROCARDIOGRAM TRACING: CPT | Performed by: NURSE PRACTITIONER

## 2024-04-30 PROCEDURE — 81001 URINALYSIS AUTO W/SCOPE: CPT

## 2024-04-30 PROCEDURE — 80053 COMPREHEN METABOLIC PANEL: CPT

## 2024-04-30 PROCEDURE — 99213 OFFICE O/P EST LOW 20 MIN: CPT | Performed by: NURSE PRACTITIONER

## 2024-04-30 PROCEDURE — 87086 URINE CULTURE/COLONY COUNT: CPT

## 2024-04-30 RX ORDER — ACETAMINOPHEN 500 MG
1000 TABLET ORAL 2 TIMES DAILY
COMMUNITY

## 2024-04-30 ASSESSMENT — ENCOUNTER SYMPTOMS
DYSPNEA AT REST: 0
PALPITATIONS: 0
CONSTIPATION: 0
NECK NEGATIVE: 1
CONSTITUTIONAL NEGATIVE: 1
BRUISES/BLEEDS EASILY: 0
ABDOMINAL PAIN: 0
LIGHT-HEADEDNESS: 0
RESPIRATORY NEGATIVE: 1
DIARRHEA: 0
ENDOCRINE NEGATIVE: 1
NEUROLOGICAL NEGATIVE: 1

## 2024-04-30 NOTE — PREPROCEDURE INSTRUCTIONS
Medication List            Accurate as of April 30, 2024  1:15 PM. Always use your most recent med list.                ascorbic acid 500 mg tablet  Commonly known as: Vitamin C  Medication Adjustments for Surgery: Stop 7 days before surgery     biotin 10 mg tablet  Medication Adjustments for Surgery: Stop 7 days before surgery     CALCIUM ORAL  Medication Adjustments for Surgery: Stop 1 day before surgery     cholecalciferol 50 MCG (2000 UT) tablet  Commonly known as: Vitamin D-3  Medication Adjustments for Surgery: Stop 1 day before surgery     chromium picolinate 1,000 mcg tablet  Medication Adjustments for Surgery: Stop 7 days before surgery     Cinnamon 500 mg capsule  Generic drug: cinnamon  Medication Adjustments for Surgery: Stop 7 days before surgery     coenzyme Q-10 100 mg capsule  Medication Adjustments for Surgery: Stop 7 days before surgery     COLLAGEN SKIN RENEWAL ORAL  Medication Adjustments for Surgery: Stop 7 days before surgery     Daily Multi-Vitamin tablet  Generic drug: multivitamin  Medication Adjustments for Surgery: Stop 7 days before surgery     DIETARY SUPPLEMENT ORAL  Medication Adjustments for Surgery: Stop 7 days before surgery  Notes to patient: Probiotic     ELDERBERRY FRUIT ORAL  Medication Adjustments for Surgery: Stop 7 days before surgery     estradiol 0.01 % (0.1 mg/gram) vaginal cream  Commonly known as: Estrace  Medication Adjustments for Surgery: Continue until night before surgery     EVENING PRIMROSE OIL ORAL  Medication Adjustments for Surgery: Stop 7 days before surgery     fexofenadine ODT 30 mg disintegrating tablet  Commonly known as: Allegra ODT  Medication Adjustments for Surgery: Stop 1 day before surgery     FIBER GUMMIES (WITH B-COMPLEX) ORAL  Medication Adjustments for Surgery: Stop 1 day before surgery     Fish OiL 300-1,000 mg capsule  Generic drug: omega-3  Medication Adjustments for Surgery: Stop 7 days before surgery     fluticasone 50 mcg/actuation nasal  spray  Commonly known as: Flonase  Notes to patient: May use day of surgery.     polyethylene glycol 17 gram packet  Commonly known as: Glycolax, Miralax  Take 17 g by mouth once daily.  Medication Adjustments for Surgery: Stop 1 day before surgery     PRESERVISION AREDS ORAL  Medication Adjustments for Surgery: Stop 7 days before surgery     * Prolia 60 mg/mL syringe  Generic drug: denosumab  INJECT 60MG SUBCUTANEOUSLY EVERY 6 MONTHS (GIVEN AT MD OFFICE)  Notes to patient: Do not take day of surgery.      * denosumab 60 mg/mL syringe  Commonly known as: Prolia  Inject 1 mL (60 mg total) under the skin every 6 months.  Notes to patient: Do not take day of surgery.     Vitamin B-12 1,000 mcg tablet extended release  Generic drug: cyanocobalamin (vitamin B-12)  Medication Adjustments for Surgery: Stop 1 day before surgery     zinc gluconate 50 mg tablet  Medication Adjustments for Surgery: Stop 7 days before surgery           * This list has 2 medication(s) that are the same as other medications prescribed for you. Read the directions carefully, and ask your doctor or other care provider to review them with you.                            **Concerning above medication instructions, if medication is normally taken at night, continue normal schedule.**  **DO NOT TAKE NIGHT PRIOR AND MORNING OF SURGERY**    CONTACT SURGEON'S OFFICE IF YOU DEVELOP:  * Fever = 100.4 F   * New respiratory symptoms (e.g. cough, shortness of breath, respiratory distress, sore throat)  * Recent loss of taste or smell  *Flu like symptoms such as headache, fatigue or gastrointestinal symptoms  * You develop any open sores, shingles, burning or painful urination   AND/OR:  * You no longer wish to have the surgery.  * Any other personal circumstances change that may lead to the need to cancel or defer this surgery.  *You were admitted to any hospital within one week of your planned procedure.    SMOKING:  *Quitting smoking can make a huge  difference to your health and recovery from surgery.    *If you need help with quitting, call 6-000-QUIT-NOW.    THE DAY BEFORE SURGERY:  *Do not eat any food after midnight the night before surgery.   *You are permitted to drink clear liquids (i.e. water, black coffee (no milk or cream), tea, apple juice and electrolyte drinks (gatorade)) up to 13.5 ounces, up to 2 hours before your arrival time.  *You may chew gum until 2 hours before your surgery    SURGICAL TIME  *You will be contacted between 2 p.m. and 6 p.m. the business day before your surgery with your arrival time.  *If you haven't received a call by 6pm, call [phone].  *Scheduled surgery times may change and you will be notified if this occurs-check your personal voicemail for any updates.    ON THE MORNING OF SURGERY:  *Wear comfortable, loose fitting clothing.   *Do not use moisturizers, creams, lotions or perfume.  *All jewelry and valuables should be left at home.  *Prosthetic devices such as contact lenses, hearing aids, dentures, eyelash extensions, hairpins and body piercing must be removed before surgery.    BRING WITH YOU:  *Photo ID and insurance card  *Current list of medicines and allergies  *Pacemaker/Defibrillator/Heart stent cards  *CPAP machine and mask  *Slings/splints/crutches  *Copy of your complete Advanced Directive/DHPOA-if applicable  *Neurostimulator implant remote    PARKING AND ARRIVAL:  *Check in at the Main Entrance desk and let them know you are here for surgery.  *You will be directed to the 2nd floor surgical waiting area.    AFTER OUTPATIENT SURGERY:  *A responsible adult MUST accompany you at the time of discharge and stay with you for 24 hours after your surgery.  *You may NOT drive yourself home after surgery.  *You may use a taxi or ride sharing service ([employer], Uber) to return home ONLY if you are accompanied by a friend or family member.  *Instructions for resuming your medications will be provided by your surgeon.

## 2024-04-30 NOTE — CPM/PAT H&P
[facility] Evaluation       Name: Manjula Ríos (Manjula Ríos)  /Age: 1945/78 y.o.         Date of Consult: 24    Referring Provider: Dr. Ignacio Montoya    Surgery, Date, and Length:   Cystoscopy; Bulkamid Injection, 24, 60 minutes         Manjula Ríos is a 78 year-old female who presents to the [facility] for perioperative risk assessment prior to surgery.    Patient presents with a primary diagnosis of symptomatic stage II anterior and apical pelvic organ prolapse, mild vaginal atrophy, and pelvic floor weakness having failed pessary management having undergone vaginal hysterectomy with bilateral salpingectomy, uterosacral ligament suspension, and perineorrhaphy 2/15/2024 with what postoperative urinary incontinence complaints having undergone normal cystoscopic evaluation and #2 ring with support and knob pessary was fitted . She does note worsening urinary urgency, she voids 5-6 times during the day but has a sense of urgency with positional changes.     This note was created in part upon personal review of patient's medical records.      Patient is scheduled to have a Cystoscopy; Bulkamid Injection.       *Patient is sensitive to all medications. Patient states she often uses children's dosing of medication*    Pt denies any past history of anesthetic complications such as PONV, awareness, prolonged sedation, dental damage, aspiration, cardiac arrest, difficult intubation, difficult I.V. access or unexpected hospital admissions.  NO malignant hyperthermia and or pseudocholinesterase deficiency.  No history of blood transfusions     The patient is not a [employer] and will accept blood and blood products if medically indicated.       Past Medical History:   Diagnosis Date    Adverse effect of anesthesia     very sensitive to all medication- often uses childrens dosing    Arthritis     Bursitis     of both hips, patient denies    Cystocele with uterine descensus     Female  bladder prolapse     GERD (gastroesophageal reflux disease)     asymptomatic    Hyperlipidemia     Kyphoscoliosis     Lung nodules     Osteoporosis     Pelvic floor weakness in female     Pelvic pain     Plantar fasciitis     resolved per patient    Problem with vaginal pessary (CMS-HCC)     failed    Prolapse of female pelvic organs     Rhinitis, allergic     Scoliosis     Urge incontinence     Uterovaginal prolapse     Vaginal atrophy     mild       Past Surgical History:   Procedure Laterality Date    CT CARDIAC SCORING WO IV CONTRAST  2022    Score= 69.4    DILATION AND CURETTAGE OF UTERUS      HYSTERECTOMY  02/15/2024    TRIGGER FINGER RELEASE Right     thumb           Family History   Problem Relation Name Age of Onset    Breast cancer Mother      Other (osteopetrosis) Mother      Ovarian cancer Mother      Stroke Father      Liver disease Father      Splenomegaly Father      Cardiomyopathy Father      Diabetes Sister      Hypertension Sister      Other (osteopetosis) Maternal Grandmother       Social History     Socioeconomic History    Marital status:      Spouse name: Not on file    Number of children: Not on file    Years of education: Not on file    Highest education level: Not on file   Occupational History    Not on file   Tobacco Use    Smoking status: Former     Current packs/day: 0.00     Average packs/day: 0.8 packs/day for 15.0 years (11.3 ttl pk-yrs)     Types: Cigarettes     Start date:      Quit date:      Years since quittin.3    Smokeless tobacco: Never   Vaping Use    Vaping status: Never Used   Substance and Sexual Activity    Alcohol use: Yes     Comment: rare social, has red wine once every few months    Drug use: Never    Sexual activity: Defer   Other Topics Concern    Not on file   Social History Narrative    Not on file     Social Determinants of Health     Financial Resource Strain: Low Risk  (2024)    Overall Financial Resource Strain (CARDIA)      Difficulty of Paying Living Expenses: Not hard at all   Food Insecurity: No Food Insecurity (2/16/2024)    Hunger Vital Sign     Worried About Running Out of Food in the Last Year: Never true     Ran Out of Food in the Last Year: Never true   Transportation Needs: No Transportation Needs (2/16/2024)    PRAPARE - Transportation     Lack of Transportation (Medical): No     Lack of Transportation (Non-Medical): No   Physical Activity: Sufficiently Active (11/17/2023)    Received from [facility]    Exercise Vital Sign     Days of Exercise per Week: 4 days     Minutes of Exercise per Session: 60 min   Stress: No Stress Concern Present (1/10/2023)    Received from [facility]    [employer] [place] [employer] - Occupational Stress Questionnaire     Feeling of Stress : Not at all   Social Connections: Unknown (2/16/2024)    Social Connection and Isolation Panel [NHANES]     Frequency of Communication with Friends and Family: Not on file     Frequency of Social Gatherings with Friends and Family: Not on file     Attends Anglican Services: Not on file     Active Member of Clubs or Organizations: Not on file     Attends Club or Organization Meetings: Not on file     Marital Status:    Intimate Partner Violence: Not At Risk (2/16/2024)    Humiliation, Afraid, Rape, and Kick questionnaire     Fear of Current or Ex-Partner: No     Emotionally Abused: No     Physically Abused: No     Sexually Abused: No   Housing Stability: Low Risk  (2/16/2024)    Housing Stability Vital Sign     Unable to Pay for Housing in the Last Year: No     Number of Places Lived in the Last Year: 1     Unstable Housing in the Last Year: No      Allergies   Allergen Reactions    Cat Dander Shortness of breath    House Dust Shortness of breath    Oxybutynin Shortness of breath and Dry mouth    Niacin Syncope and Bleeding    Hay Fever And Allergy Relief Other    Naproxen GI bleeding and Other     bleeding    Rosuvastatin Other      Fatigue, brain fog, leg pain         Current Outpatient Medications:     ascorbic acid (Vitamin C) 500 mg tablet, Take 1 tablet (500 mg) by mouth once daily., Disp: , Rfl:     ascorbic acid/collagen hydr (COLLAGEN SKIN RENEWAL ORAL), Take 1 Dose by mouth once daily., Disp: , Rfl:     biotin 10 mg tablet, Take by mouth., Disp: , Rfl:     CALCIUM ORAL, Take 750 mg by mouth., Disp: , Rfl:     cholecalciferol (Vitamin D-3) 50 MCG (2000 UT) tablet, Take by mouth., Disp: , Rfl:     chromium picolinate 1,000 mcg tablet, Take by mouth., Disp: , Rfl:     Cinnamon 500 mg capsule, Take by mouth., Disp: , Rfl:     coenzyme Q-10 100 mg capsule, Take by mouth., Disp: , Rfl:     cyanocobalamin, vitamin B-12, (Vitamin B-12) 1,000 mcg tablet extended release, Take by mouth., Disp: , Rfl:     DIETARY SUPPLEMENT ORAL, Take 1 Dose by mouth once daily. PROBIOTIC, Disp: , Rfl:     ELDERBERRY FRUIT ORAL, Take 1 Dose by mouth once daily., Disp: , Rfl:     estradiol (Estrace) 0.01 % (0.1 mg/gram) vaginal cream, Insert into the vagina 3 times a week., Disp: , Rfl:     EVENING PRIMROSE OIL ORAL, Take 1 Dose by mouth once daily., Disp: , Rfl:     fluticasone (Flonase) 50 mcg/actuation nasal spray, Administer 1 spray into each nostril once daily. Shake gently. Before first use, prime pump. After use, clean tip and replace cap., Disp: , Rfl:     multivitamin (Daily Multi-Vitamin) tablet, Take by mouth., Disp: , Rfl:     polydextrose/vitamin B complex (FIBER GUMMIES, WITH B-COMPLEX, ORAL), Take 1 tablet by mouth once daily at bedtime., Disp: , Rfl:     vit A/vit C/vit E/zinc/copper (PRESERVISION AREDS ORAL), Take 2 capsules by mouth once daily., Disp: , Rfl:     zinc gluconate 50 mg tablet, Take 1 tablet (50 mg of elemental zinc) by mouth once daily., Disp: , Rfl:     denosumab (Prolia) 60 mg/mL syringe, INJECT 60MG SUBCUTANEOUSLY EVERY 6 MONTHS (GIVEN AT MD OFFICE) (Patient not taking: Reported on 2/9/2024), Disp: 1 mL, Rfl: 1    denosumab  "(Prolia) 60 mg/mL syringe, Inject 1 mL (60 mg total) under the skin every 6 months. (Patient not taking: Reported on 4/30/2024), Disp: 1 mL, Rfl: 1    fexofenadine ODT (Allegra ODT) 30 mg disintegrating tablet, Take 1 tablet (30 mg) by mouth if needed for allergies., Disp: , Rfl:     omega-3 (Fish OiL) 300-1,000 mg capsule, Take 1 capsule (1,000 mg) by mouth 2 times a day. 750 mg, Disp: , Rfl:     polyethylene glycol (Glycolax, Miralax) 17 gram packet, Take 17 g by mouth once daily. (Patient not taking: Reported on 4/30/2024), Disp: 20 packet, Rfl: 1        Visit Vitals  /68   Pulse 83   Temp 36.9 °C (98.4 °F)   Resp 18   Ht 1.47 m (4' 9.87\")   Wt (!) 44 kg (97 lb)   SpO2 97%   BMI 20.36 kg/m²   Smoking Status Former   BSA 1.34 m²        PAT ROS:   Constitutional:   neg    Neuro/Psych:   neg     no light-headedness  Eyes:    no corrective lenses  Ears:    no hearing aides  Nose:   Mouth:   neg    Throat:   neg    Neck:   neg    Cardio:    Cardio conditioning barre/ab class. Functional 4 Mets. Patient denies SOB walking up 2 flights of stairs    no chest pain   no palpitations   no dyspnea  Respiratory:   neg    Endocrine:   neg    GI:    no abdominal pain   no constipation   no diarrhea  :    Not able to urinate fully empty bladder, urgency  Musculoskeletal:   Hematologic:    does not bruise/bleed easily   no history of blood transfusion   no blood clots  Skin:  neg        Physical Exam  HENT:      Head: Normocephalic and atraumatic.      Nose: Nose normal.      Mouth/Throat:      Mouth: Mucous membranes are moist.      Pharynx: Oropharynx is clear.   Eyes:      Pupils: Pupils are equal, round, and reactive to light.   Cardiovascular:      Rate and Rhythm: Regular rhythm. Bradycardia present.      Pulses: Normal pulses.      Heart sounds: Normal heart sounds.   Pulmonary:      Effort: Pulmonary effort is normal.      Breath sounds: Normal breath sounds.   Abdominal:      General: Bowel sounds are normal.     " " Palpations: Abdomen is soft.   Musculoskeletal:         General: Normal range of motion.      Cervical back: Normal range of motion.   Skin:     General: Skin is warm and dry.      Capillary Refill: Capillary refill takes less than 2 seconds.   Neurological:      General: No focal deficit present.      Mental Status: She is alert and oriented to person, place, and time.          PAT AIRWAY:   Airway:     Mallampati::  II    Neck ROM::  Full   No broken teeth, no dentures and no missing teeth         Visit Vitals  /68   Pulse 83   Temp 36.9 °C (98.4 °F)   Resp 18   Ht 1.47 m (4' 9.87\")   Wt (!) 44 kg (97 lb)   SpO2 97%   BMI 20.36 kg/m²   Smoking Status Former   BSA 1.34 m²          Plan    Cardiovascular:  Patient denies any chest pain, tightness, heaviness, pressure, radiating pain, palpitations, irregular heartbeats, lightheadedness, cough, congestion, shortness of breath, ESTEBAN, PND, near syncope, weight loss or gain.       EKG in PAT on 04/30/24   Sinus Bradycardia at 52 bpm    RCRI: 0 Risk of Mace: 3.9%    Pulm:  Denies shortness of breath or activity intolerance    Stop Bang= 1 (age)    GI/:  GERD: well controlled    Heme:  Patient instructed to ambulate as soon as possible postoperatively to decrease thromboembolic risk.    Initiate mechanical DVT prophylaxis as soon as possible and initiate chemical prophylaxis when deemed safe from a bleeding standpoint post surgery.    Caprini=5    Risk assessment complete.  Patient is scheduled for an intermediate surgical risk procedure.       Labs/testing obtained in PAT on 04/30/24   CBC, CMP, EKG, UA  Lab Results   Component Value Date    WBC 8.4 04/30/2024    HGB 12.8 04/30/2024    HCT 39.4 04/30/2024    MCV 91 04/30/2024     04/30/2024      Lab Results   Component Value Date    GLUCOSE 83 04/30/2024    CALCIUM 9.4 04/30/2024     (L) 04/30/2024    K 4.3 04/30/2024    CO2 27 04/30/2024    CL 99 04/30/2024    BUN 30 (H) 04/30/2024    CREATININE 0.65 " 04/30/2024      Lab Results   Component Value Date    ALT 23 04/30/2024    AST 28 04/30/2024    ALKPHOS 55 04/30/2024    BILITOT 0.6 04/30/2024      ntains abnormal data Urinalysis with Reflex Culture and Microscopic  Order: [id number] - Part of Panel Order [id number]   Status: Final result       Visible to patient: Yes (seen)       Dx: Pre-op testing    0 Result Notes       Component  Ref Range & Units 1 d ago 2 mo ago   Color, Urine  Light-Yellow, Yellow, Dark-Yellow Light-Yellow Yellow R   Appearance, Urine  Clear Clear Clear   Specific Gravity, Urine  1.005 - 1.035 1.024 1.017   pH, Urine  5.0, 5.5, 6.0, 6.5, 7.0, 7.5, 8.0 6.0 6.0   Protein, Urine  NEGATIVE, 10 (TRACE), 20 (TRACE) mg/dL NEGATIVE NEGATIVE R   Glucose, Urine  Normal mg/dL Normal NEGATIVE R   Blood, Urine  NEGATIVE NEGATIVE NEGATIVE   Ketones, Urine  NEGATIVE mg/dL NEGATIVE 5 (TRACE) Abnormal    Bilirubin, Urine  NEGATIVE NEGATIVE NEGATIVE   Urobilinogen, Urine  Normal mg/dL Normal <2.0 R   Nitrite, Urine  NEGATIVE NEGATIVE NEGATIVE   Leukocyte Esterase, Urine  NEGATIVE 500 Colleen/µL Abnormal  NEGATIVE   Resulting Agency [facility]              Specimen Collected: 04/30/24 14:17 Last Resulted: 04/30/24 15:33               Labs reviewed, unremarkable. Urine culture sent.     Follow up: urine culture    Preoperative medication instructions were provided and reviewed with the patient.  Any additional testing or evaluation was explained to the patient.  Nothing by mouth instructions were discussed and patient's questions were answered prior to conclusion to this encounter.  Patient verbalized understanding of preoperative instructions given in preadmission testing; discharge instructions available in EMR.    This note was dictated with speech recognition.  Minor errors may have been detected during use of speech recognition.

## 2024-04-30 NOTE — H&P (VIEW-ONLY)
[facility] Evaluation       Name: Manjula Ríos (Manjula Ríos)  /Age: 1945/78 y.o.         Date of Consult: 24    Referring Provider: Dr. Ignacio Montoya    Surgery, Date, and Length:   Cystoscopy; Bulkamid Injection, 24, 60 minutes         Manjula Ríos is a 78 year-old female who presents to the [facility] for perioperative risk assessment prior to surgery.    Patient presents with a primary diagnosis of symptomatic stage II anterior and apical pelvic organ prolapse, mild vaginal atrophy, and pelvic floor weakness having failed pessary management having undergone vaginal hysterectomy with bilateral salpingectomy, uterosacral ligament suspension, and perineorrhaphy 2/15/2024 with what postoperative urinary incontinence complaints having undergone normal cystoscopic evaluation and #2 ring with support and knob pessary was fitted . She does note worsening urinary urgency, she voids 5-6 times during the day but has a sense of urgency with positional changes.     This note was created in part upon personal review of patient's medical records.      Patient is scheduled to have a Cystoscopy; Bulkamid Injection.       *Patient is sensitive to all medications. Patient states she often uses children's dosing of medication*    Pt denies any past history of anesthetic complications such as PONV, awareness, prolonged sedation, dental damage, aspiration, cardiac arrest, difficult intubation, difficult I.V. access or unexpected hospital admissions.  NO malignant hyperthermia and or pseudocholinesterase deficiency.  No history of blood transfusions     The patient is not a Mosque and will accept blood and blood products if medically indicated.       Past Medical History:   Diagnosis Date    Adverse effect of anesthesia     very sensitive to all medication- often uses childrens dosing    Arthritis     Bursitis     of both hips, patient denies    Cystocele with uterine descensus     Female  bladder prolapse     GERD (gastroesophageal reflux disease)     asymptomatic    Hyperlipidemia     Kyphoscoliosis     Lung nodules     Osteoporosis     Pelvic floor weakness in female     Pelvic pain     Plantar fasciitis     resolved per patient    Problem with vaginal pessary (CMS-HCC)     failed    Prolapse of female pelvic organs     Rhinitis, allergic     Scoliosis     Urge incontinence     Uterovaginal prolapse     Vaginal atrophy     mild       Past Surgical History:   Procedure Laterality Date    CT CARDIAC SCORING WO IV CONTRAST  2022    Score= 69.4    DILATION AND CURETTAGE OF UTERUS      HYSTERECTOMY  02/15/2024    TRIGGER FINGER RELEASE Right     thumb           Family History   Problem Relation Name Age of Onset    Breast cancer Mother      Other (osteopetrosis) Mother      Ovarian cancer Mother      Stroke Father      Liver disease Father      Splenomegaly Father      Cardiomyopathy Father      Diabetes Sister      Hypertension Sister      Other (osteopetosis) Maternal Grandmother       Social History     Socioeconomic History    Marital status:      Spouse name: Not on file    Number of children: Not on file    Years of education: Not on file    Highest education level: Not on file   Occupational History    Not on file   Tobacco Use    Smoking status: Former     Current packs/day: 0.00     Average packs/day: 0.8 packs/day for 15.0 years (11.3 ttl pk-yrs)     Types: Cigarettes     Start date:      Quit date:      Years since quittin.3    Smokeless tobacco: Never   Vaping Use    Vaping status: Never Used   Substance and Sexual Activity    Alcohol use: Yes     Comment: rare social, has red wine once every few months    Drug use: Never    Sexual activity: Defer   Other Topics Concern    Not on file   Social History Narrative    Not on file     Social Determinants of Health     Financial Resource Strain: Low Risk  (2024)    Overall Financial Resource Strain (CARDIA)      Difficulty of Paying Living Expenses: Not hard at all   Food Insecurity: No Food Insecurity (2/16/2024)    Hunger Vital Sign     Worried About Running Out of Food in the Last Year: Never true     Ran Out of Food in the Last Year: Never true   Transportation Needs: No Transportation Needs (2/16/2024)    PRAPARE - Transportation     Lack of Transportation (Medical): No     Lack of Transportation (Non-Medical): No   Physical Activity: Sufficiently Active (11/17/2023)    Received from [facility]    Exercise Vital Sign     Days of Exercise per Week: 4 days     Minutes of Exercise per Session: 60 min   Stress: No Stress Concern Present (1/10/2023)    Received from [facility]    [employer] - Occupational Stress Questionnaire     Feeling of Stress : Not at all   Social Connections: Unknown (2/16/2024)    Social Connection and Isolation Panel [NHANES]     Frequency of Communication with Friends and Family: Not on file     Frequency of Social Gatherings with Friends and Family: Not on file     Attends Congregational Services: Not on file     Active Member of Clubs or Organizations: Not on file     Attends Club or Organization Meetings: Not on file     Marital Status:    Intimate Partner Violence: Not At Risk (2/16/2024)    Humiliation, Afraid, Rape, and Kick questionnaire     Fear of Current or Ex-Partner: No     Emotionally Abused: No     Physically Abused: No     Sexually Abused: No   Housing Stability: Low Risk  (2/16/2024)    Housing Stability Vital Sign     Unable to Pay for Housing in the Last Year: No     Number of Places Lived in the Last Year: 1     Unstable Housing in the Last Year: No      Allergies   Allergen Reactions    Cat Dander Shortness of breath    House Dust Shortness of breath    Oxybutynin Shortness of breath and Dry mouth    Niacin Syncope and Bleeding    Hay Fever And Allergy Relief Other    Naproxen GI bleeding and Other     bleeding    Rosuvastatin Other      Fatigue, brain fog, leg pain         Current Outpatient Medications:     ascorbic acid (Vitamin C) 500 mg tablet, Take 1 tablet (500 mg) by mouth once daily., Disp: , Rfl:     ascorbic acid/collagen hydr (COLLAGEN SKIN RENEWAL ORAL), Take 1 Dose by mouth once daily., Disp: , Rfl:     biotin 10 mg tablet, Take by mouth., Disp: , Rfl:     CALCIUM ORAL, Take 750 mg by mouth., Disp: , Rfl:     cholecalciferol (Vitamin D-3) 50 MCG (2000 UT) tablet, Take by mouth., Disp: , Rfl:     chromium picolinate 1,000 mcg tablet, Take by mouth., Disp: , Rfl:     Cinnamon 500 mg capsule, Take by mouth., Disp: , Rfl:     coenzyme Q-10 100 mg capsule, Take by mouth., Disp: , Rfl:     cyanocobalamin, vitamin B-12, (Vitamin B-12) 1,000 mcg tablet extended release, Take by mouth., Disp: , Rfl:     DIETARY SUPPLEMENT ORAL, Take 1 Dose by mouth once daily. PROBIOTIC, Disp: , Rfl:     ELDERBERRY FRUIT ORAL, Take 1 Dose by mouth once daily., Disp: , Rfl:     estradiol (Estrace) 0.01 % (0.1 mg/gram) vaginal cream, Insert into the vagina 3 times a week., Disp: , Rfl:     EVENING PRIMROSE OIL ORAL, Take 1 Dose by mouth once daily., Disp: , Rfl:     fluticasone (Flonase) 50 mcg/actuation nasal spray, Administer 1 spray into each nostril once daily. Shake gently. Before first use, prime pump. After use, clean tip and replace cap., Disp: , Rfl:     multivitamin (Daily Multi-Vitamin) tablet, Take by mouth., Disp: , Rfl:     polydextrose/vitamin B complex (FIBER GUMMIES, WITH B-COMPLEX, ORAL), Take 1 tablet by mouth once daily at bedtime., Disp: , Rfl:     vit A/vit C/vit E/zinc/copper (PRESERVISION AREDS ORAL), Take 2 capsules by mouth once daily., Disp: , Rfl:     zinc gluconate 50 mg tablet, Take 1 tablet (50 mg of elemental zinc) by mouth once daily., Disp: , Rfl:     denosumab (Prolia) 60 mg/mL syringe, INJECT 60MG SUBCUTANEOUSLY EVERY 6 MONTHS (GIVEN AT MD OFFICE) (Patient not taking: Reported on 2/9/2024), Disp: 1 mL, Rfl: 1    denosumab  "(Prolia) 60 mg/mL syringe, Inject 1 mL (60 mg total) under the skin every 6 months. (Patient not taking: Reported on 4/30/2024), Disp: 1 mL, Rfl: 1    fexofenadine ODT (Allegra ODT) 30 mg disintegrating tablet, Take 1 tablet (30 mg) by mouth if needed for allergies., Disp: , Rfl:     omega-3 (Fish OiL) 300-1,000 mg capsule, Take 1 capsule (1,000 mg) by mouth 2 times a day. 750 mg, Disp: , Rfl:     polyethylene glycol (Glycolax, Miralax) 17 gram packet, Take 17 g by mouth once daily. (Patient not taking: Reported on 4/30/2024), Disp: 20 packet, Rfl: 1        Visit Vitals  /68   Pulse 83   Temp 36.9 °C (98.4 °F)   Resp 18   Ht 1.47 m (4' 9.87\")   Wt (!) 44 kg (97 lb)   SpO2 97%   BMI 20.36 kg/m²   Smoking Status Former   BSA 1.34 m²        PAT ROS:   Constitutional:   neg    Neuro/Psych:   neg     no light-headedness  Eyes:    no corrective lenses  Ears:    no hearing aides  Nose:   Mouth:   neg    Throat:   neg    Neck:   neg    Cardio:    Cardio conditioning barre/ab class. Functional 4 Mets. Patient denies SOB walking up 2 flights of stairs    no chest pain   no palpitations   no dyspnea  Respiratory:   neg    Endocrine:   neg    GI:    no abdominal pain   no constipation   no diarrhea  :    Not able to urinate fully empty bladder, urgency  Musculoskeletal:   Hematologic:    does not bruise/bleed easily   no history of blood transfusion   no blood clots  Skin:  neg        Physical Exam  HENT:      Head: Normocephalic and atraumatic.      Nose: Nose normal.      Mouth/Throat:      Mouth: Mucous membranes are moist.      Pharynx: Oropharynx is clear.   Eyes:      Pupils: Pupils are equal, round, and reactive to light.   Cardiovascular:      Rate and Rhythm: Regular rhythm. Bradycardia present.      Pulses: Normal pulses.      Heart sounds: Normal heart sounds.   Pulmonary:      Effort: Pulmonary effort is normal.      Breath sounds: Normal breath sounds.   Abdominal:      General: Bowel sounds are normal.     " " Palpations: Abdomen is soft.   Musculoskeletal:         General: Normal range of motion.      Cervical back: Normal range of motion.   Skin:     General: Skin is warm and dry.      Capillary Refill: Capillary refill takes less than 2 seconds.   Neurological:      General: No focal deficit present.      Mental Status: She is alert and oriented to person, place, and time.          PAT AIRWAY:   Airway:     Mallampati::  II    Neck ROM::  Full   No broken teeth, no dentures and no missing teeth         Visit Vitals  /68   Pulse 83   Temp 36.9 °C (98.4 °F)   Resp 18   Ht 1.47 m (4' 9.87\")   Wt (!) 44 kg (97 lb)   SpO2 97%   BMI 20.36 kg/m²   Smoking Status Former   BSA 1.34 m²          Plan    Cardiovascular:  Patient denies any chest pain, tightness, heaviness, pressure, radiating pain, palpitations, irregular heartbeats, lightheadedness, cough, congestion, shortness of breath, ESTEBAN, PND, near syncope, weight loss or gain.       EKG in PAT on 04/30/24   Sinus Bradycardia at 52 bpm    RCRI: 0 Risk of Mace: 3.9%    Pulm:  Denies shortness of breath or activity intolerance    Stop Bang= 1 (age)    GI/:  GERD: well controlled    Heme:  Patient instructed to ambulate as soon as possible postoperatively to decrease thromboembolic risk.    Initiate mechanical DVT prophylaxis as soon as possible and initiate chemical prophylaxis when deemed safe from a bleeding standpoint post surgery.    Caprini=5    Risk assessment complete.  Patient is scheduled for an intermediate surgical risk procedure.       Labs/testing obtained in PAT on 04/30/24   CBC, CMP, EKG, UA  Lab Results   Component Value Date    WBC 8.4 04/30/2024    HGB 12.8 04/30/2024    HCT 39.4 04/30/2024    MCV 91 04/30/2024     04/30/2024      Lab Results   Component Value Date    GLUCOSE 83 04/30/2024    CALCIUM 9.4 04/30/2024     (L) 04/30/2024    K 4.3 04/30/2024    CO2 27 04/30/2024    CL 99 04/30/2024    BUN 30 (H) 04/30/2024    CREATININE 0.65 " 04/30/2024      Lab Results   Component Value Date    ALT 23 04/30/2024    AST 28 04/30/2024    ALKPHOS 55 04/30/2024    BILITOT 0.6 04/30/2024      ntains abnormal data Urinalysis with Reflex Culture and Microscopic  Order: [id number] - Part of Panel Order [id number]   Status: Final result       Visible to patient: Yes (seen)       Dx: Pre-op testing    0 Result Notes       Component  Ref Range & Units 1 d ago 2 mo ago   Color, Urine  Light-Yellow, Yellow, Dark-Yellow Light-Yellow Yellow R   Appearance, Urine  Clear Clear Clear   Specific Gravity, Urine  1.005 - 1.035 1.024 1.017   pH, Urine  5.0, 5.5, 6.0, 6.5, 7.0, 7.5, 8.0 6.0 6.0   Protein, Urine  NEGATIVE, 10 (TRACE), 20 (TRACE) mg/dL NEGATIVE NEGATIVE R   Glucose, Urine  Normal mg/dL Normal NEGATIVE R   Blood, Urine  NEGATIVE NEGATIVE NEGATIVE   Ketones, Urine  NEGATIVE mg/dL NEGATIVE 5 (TRACE) Abnormal    Bilirubin, Urine  NEGATIVE NEGATIVE NEGATIVE   Urobilinogen, Urine  Normal mg/dL Normal <2.0 R   Nitrite, Urine  NEGATIVE NEGATIVE NEGATIVE   Leukocyte Esterase, Urine  NEGATIVE 500 Colleen/µL Abnormal  NEGATIVE   Resulting Agency [facility]              Specimen Collected: 04/30/24 14:17 Last Resulted: 04/30/24 15:33               Labs reviewed, unremarkable. Urine culture sent.     Follow up: urine culture    Preoperative medication instructions were provided and reviewed with the patient.  Any additional testing or evaluation was explained to the patient.  Nothing by mouth instructions were discussed and patient's questions were answered prior to conclusion to this encounter.  Patient verbalized understanding of preoperative instructions given in preadmission testing; discharge instructions available in EMR.    This note was dictated with speech recognition.  Minor errors may have been detected during use of speech recognition.

## 2024-05-01 ENCOUNTER — APPOINTMENT (OUTPATIENT)
Dept: ENDOCRINOLOGY | Facility: CLINIC | Age: 79
End: 2024-05-01
Payer: MEDICARE

## 2024-05-01 LAB
ATRIAL RATE: 52 BPM
P AXIS: 79 DEGREES
P OFFSET: 190 MS
P ONSET: 140 MS
PR INTERVAL: 172 MS
Q ONSET: 226 MS
QRS COUNT: 9 BEATS
QRS DURATION: 72 MS
QT INTERVAL: 438 MS
QTC CALCULATION(BAZETT): 407 MS
QTC FREDERICIA: 417 MS
R AXIS: 73 DEGREES
T AXIS: 67 DEGREES
T OFFSET: 445 MS
VENTRICULAR RATE: 52 BPM

## 2024-05-02 LAB — BACTERIA UR CULT: NORMAL

## 2024-05-07 ENCOUNTER — TELEPHONE (OUTPATIENT)
Dept: ENDOCRINOLOGY | Facility: CLINIC | Age: 79
End: 2024-05-07
Payer: MEDICARE

## 2024-05-13 ENCOUNTER — ANESTHESIA EVENT (OUTPATIENT)
Dept: OPERATING ROOM | Facility: HOSPITAL | Age: 79
End: 2024-05-13
Payer: MEDICARE

## 2024-05-13 PROBLEM — K21.9 GASTROESOPHAGEAL REFLUX DISEASE WITHOUT ESOPHAGITIS: Status: ACTIVE | Noted: 2024-05-13

## 2024-05-13 PROBLEM — E78.00 PURE HYPERCHOLESTEROLEMIA: Status: ACTIVE | Noted: 2024-05-13

## 2024-05-13 NOTE — ANESTHESIA PREPROCEDURE EVALUATION
Patient: Manjula Ríos    Procedure Information       Date/Time: 24 1130    Procedure: Cystoscopy; Bulkamid Injection    Location: [facility] A OR 01 /  [facility] A OR    Surgeons: Ignacio Montoya MD                                                           Pre-Anesthesia Evaluation      Manjula Ríos is a 78 y.o. female who presents for procedure stated above.       Relevant Problems   Anesthesia  Very sensitive to the effects of anesthetic drugs      Cardiac   (+) Hyperlipidemia   (+) Pure hypercholesterolemia      Pulmonary   (+) Lung nodules      GI   (+) GERD (gastroesophageal reflux disease)      Musculoskeletal   (+) Kyphoscoliosis      HEENT   (+) Chronic maxillary sinusitis       Past Surgical History:   Procedure Laterality Date    CT CARDIAC SCORING WO IV CONTRAST  2022    Score= 69.4    DILATION AND CURETTAGE OF UTERUS      HYSTERECTOMY  02/15/2024    TRIGGER FINGER RELEASE Right     thumb     Social History     Tobacco Use    Smoking status: Former     Current packs/day: 0.00     Average packs/day: 0.8 packs/day for 15.0 years (11.3 ttl pk-yrs)     Types: Cigarettes     Start date:      Quit date:      Years since quittin.3    Smokeless tobacco: Never   Vaping Use    Vaping status: Never Used   Substance Use Topics    Alcohol use: Yes     Comment: rare social, has red wine once every few months    Drug use: Never     Allergies   Allergen Reactions    Cat Dander Shortness of breath    House Dust Shortness of breath    Oxybutynin Shortness of breath and Dry mouth    Niacin Syncope and Bleeding    Hay Fever And Allergy Relief Other    Naproxen GI bleeding and Other     bleeding    Rosuvastatin Other     Fatigue, brain fog, leg pain     Current Outpatient Medications   Medication Instructions    ascorbic acid (VITAMIN C) 500 mg, oral, Daily    ascorbic acid/collagen hydr (COLLAGEN SKIN RENEWAL ORAL) 1 Dose, oral, Daily    biotin 10 mg tablet oral    CALCIUM ORAL 750 mg, oral     cholecalciferol (Vitamin D-3) 50 MCG (2000 UT) tablet oral    chromium picolinate 1,000 mcg tablet oral    Cinnamon 500 mg capsule oral    coenzyme Q-10 100 mg, oral, Last taken 5/6    cyanocobalamin, vitamin B-12, (Vitamin B-12) 1,000 mcg tablet extended release oral    denosumab (Prolia) 60 mg/mL syringe INJECT 60MG SUBCUTANEOUSLY EVERY 6 MONTHS (GIVEN AT MD OFFICE)    denosumab (PROLIA) 60 mg, subcutaneous, Every 6 months    DIETARY SUPPLEMENT ORAL 1 Dose, oral, Daily, PROBIOTIC    ELDERBERRY FRUIT ORAL 1 Dose, oral, Daily    estradiol (Estrace) 0.01 % (0.1 mg/gram) vaginal cream vaginal, 3 times weekly    EVENING PRIMROSE OIL ORAL 1 Dose, oral, Daily    fexofenadine ODT (ALLEGRA ODT) 30 mg, oral, As needed    fluticasone (Flonase) 50 mcg/actuation nasal spray 1 spray, Each Nostril, Daily, Shake gently. Before first use, prime pump. After use, clean tip and replace cap.    multivitamin (Daily Multi-Vitamin) tablet oral    omega-3 (Fish OiL) 300-1,000 mg capsule 1,000 mg, oral, 2 times daily, 750 mg    phenazopyridine (PYRIDIUM) 100 mg, oral, 3 times daily PRN    polydextrose/vitamin B complex (FIBER GUMMIES, WITH B-COMPLEX, ORAL) 1 tablet, oral, Nightly    polyethylene glycol (GLYCOLAX, MIRALAX) 17 g, oral, Daily    vit A/vit C/vit E/zinc/copper (PRESERVISION AREDS ORAL) 2 capsules, oral, Daily    zinc gluconate 50 mg tablet 50 mg of elemental zinc, oral, Daily       Relevant Results reviewed  Recent Labs     04/30/24  1417 02/16/24  0652 02/09/24  1338   WBC 8.4 8.0 6.4   HGB 12.8 13.0 13.3   HCT 39.4 37.1 40.8    225 218   MCV 91 85 91     Recent Labs     04/30/24  1417 02/16/24  0653 02/09/24  1338 07/28/23  1127 01/23/23  1056 07/19/22  1316   * 138 136 138  --  140   K 4.3 3.7 4.7 4.0  --  4.1   CL 99 104 100 104  --  103   BUN 30* 10 22 27*  --  23   CREATININE 0.65 0.54 0.72 0.63  --  0.77   CO2 27 26 30 29  --  30   CALCIUM 9.4 8.8 10.0  10.0 8.8   < > 9.9   PROT 7.0  --   --  6.5  --  6.8  "  BILITOT 0.6  --   --  0.7  --  1.0   ALKPHOS 55  --   --  37  --  30*   ALT 23  --   --  21  --  19   AST 28  --   --  24  --  27   GLUCOSE 83 87 94 85  --  85    < > = values in this interval not displayed.       Lab Results   Component Value Date    ABO A 2021           Past Cardiology Tests (Last 3 Years):  EK24     Sinus bradycardia  Otherwise normal ECG  No previous ECGs available  Confirmed by Magnus Nielson ([id number]) on 2024 8:20:57 AM    Cardiac Imaging:  CT HEART CALCIUM SCORING WO IV CONTRAST    - Impression -  1. Coronary artery calcium score of 69.4 *. Of note, the prior  coronary artery calcium score on CT scan dated 2019 was 52    *Coronary Artery Calcium Gated and Nongated Agatston score  Score                                      Risk  0                                       Very low  1-99                                  Mildly increased  100-299                             Moderately increased  >300                                Moderate to severely increased      Visit Vitals  /75   Pulse 63   Temp 36.6 °C (97.9 °F) (Temporal)   Resp 16   Ht 1.473 m (4' 10\")   Wt (!) 44.7 kg (98 lb 8.7 oz)   LMP  (LMP Unknown)   SpO2 99%   BMI 20.60 kg/m²   OB Status Postmenopausal   Smoking Status Former   BSA 1.35 m²               Clinical information reviewed:   Tobacco  Allergies  Meds   Med Hx  Surg Hx  OB Status  Fam Hx  Soc   Hx        NPO Detail:  NPO/Void Status  Date of Last Liquid: 24  Time of Last Liquid:   Date of Last Solid: 24  Time of Last Solid:          Physical Exam    Airway  Mallampati: III  TM distance: >3 FB  Neck ROM: full     Cardiovascular   Rhythm: regular  Rate: normal     Dental - normal exam     Pulmonary   Comments: Normal RR and effort   Abdominal            Anesthesia Plan    History of general anesthesia?: yes  History of complications of general anesthesia?: no    ASA 2     MAC   (With standard ASA monitoring.)  The patient is " not a current smoker.    intravenous induction   Anesthetic plan and risks discussed with patient.    Plan discussed with CRNA and CAA.

## 2024-05-14 ENCOUNTER — ANESTHESIA (OUTPATIENT)
Dept: OPERATING ROOM | Facility: HOSPITAL | Age: 79
End: 2024-05-14
Payer: MEDICARE

## 2024-05-14 ENCOUNTER — HOSPITAL ENCOUNTER (OUTPATIENT)
Facility: HOSPITAL | Age: 79
Setting detail: OUTPATIENT SURGERY
Discharge: HOME | End: 2024-05-14
Attending: OBSTETRICS & GYNECOLOGY | Admitting: OBSTETRICS & GYNECOLOGY
Payer: MEDICARE

## 2024-05-14 VITALS
BODY MASS INDEX: 20.69 KG/M2 | RESPIRATION RATE: 16 BRPM | SYSTOLIC BLOOD PRESSURE: 106 MMHG | DIASTOLIC BLOOD PRESSURE: 61 MMHG | HEIGHT: 58 IN | TEMPERATURE: 96.8 F | WEIGHT: 98.55 LBS | HEART RATE: 64 BPM | OXYGEN SATURATION: 100 %

## 2024-05-14 DIAGNOSIS — N36.42 INTRINSIC SPHINCTER DEFICIENCY: ICD-10-CM

## 2024-05-14 DIAGNOSIS — N39.3 FEMALE STRESS INCONTINENCE: Primary | ICD-10-CM

## 2024-05-14 PROBLEM — E78.5 HYPERLIPIDEMIA: Status: ACTIVE | Noted: 2024-05-14

## 2024-05-14 PROBLEM — R91.8 LUNG NODULES: Status: ACTIVE | Noted: 2024-05-14

## 2024-05-14 PROBLEM — M41.9 KYPHOSCOLIOSIS: Status: ACTIVE | Noted: 2024-05-14

## 2024-05-14 PROCEDURE — 2500000004 HC RX 250 GENERAL PHARMACY W/ HCPCS (ALT 636 FOR OP/ED): Performed by: ANESTHESIOLOGY

## 2024-05-14 PROCEDURE — A51715 PR ENDOSCOPIC INJECTION/IMPLANT: Performed by: ANESTHESIOLOGY

## 2024-05-14 PROCEDURE — 2500000005 HC RX 250 GENERAL PHARMACY W/O HCPCS: Performed by: NURSE ANESTHETIST, CERTIFIED REGISTERED

## 2024-05-14 PROCEDURE — 3700000001 HC GENERAL ANESTHESIA TIME - INITIAL BASE CHARGE: Performed by: OBSTETRICS & GYNECOLOGY

## 2024-05-14 PROCEDURE — 3700000002 HC GENERAL ANESTHESIA TIME - EACH INCREMENTAL 1 MINUTE: Performed by: OBSTETRICS & GYNECOLOGY

## 2024-05-14 PROCEDURE — 7100000001 HC RECOVERY ROOM TIME - INITIAL BASE CHARGE: Performed by: OBSTETRICS & GYNECOLOGY

## 2024-05-14 PROCEDURE — 2780000003 HC OR 278 NO HCPCS: Performed by: OBSTETRICS & GYNECOLOGY

## 2024-05-14 PROCEDURE — 2500000004 HC RX 250 GENERAL PHARMACY W/ HCPCS (ALT 636 FOR OP/ED): Performed by: OBSTETRICS & GYNECOLOGY

## 2024-05-14 PROCEDURE — 99100 ANES PT EXTEME AGE<1 YR&>70: CPT | Performed by: ANESTHESIOLOGY

## 2024-05-14 PROCEDURE — 7100000009 HC PHASE TWO TIME - INITIAL BASE CHARGE: Performed by: OBSTETRICS & GYNECOLOGY

## 2024-05-14 PROCEDURE — 51715 ENDOSCOPIC INJECTION/IMPLANT: CPT | Performed by: OBSTETRICS & GYNECOLOGY

## 2024-05-14 PROCEDURE — 7100000002 HC RECOVERY ROOM TIME - EACH INCREMENTAL 1 MINUTE: Performed by: OBSTETRICS & GYNECOLOGY

## 2024-05-14 PROCEDURE — 3600000008 HC OR TIME - EACH INCREMENTAL 1 MINUTE - PROCEDURE LEVEL THREE: Performed by: OBSTETRICS & GYNECOLOGY

## 2024-05-14 PROCEDURE — A4217 STERILE WATER/SALINE, 500 ML: HCPCS | Performed by: OBSTETRICS & GYNECOLOGY

## 2024-05-14 PROCEDURE — A51715 PR ENDOSCOPIC INJECTION/IMPLANT: Performed by: NURSE ANESTHETIST, CERTIFIED REGISTERED

## 2024-05-14 PROCEDURE — 7100000010 HC PHASE TWO TIME - EACH INCREMENTAL 1 MINUTE: Performed by: OBSTETRICS & GYNECOLOGY

## 2024-05-14 PROCEDURE — 3600000003 HC OR TIME - INITIAL BASE CHARGE - PROCEDURE LEVEL THREE: Performed by: OBSTETRICS & GYNECOLOGY

## 2024-05-14 PROCEDURE — C1889 IMPLANT/INSERT DEVICE, NOC: HCPCS | Performed by: OBSTETRICS & GYNECOLOGY

## 2024-05-14 PROCEDURE — 2500000004 HC RX 250 GENERAL PHARMACY W/ HCPCS (ALT 636 FOR OP/ED): Performed by: NURSE ANESTHETIST, CERTIFIED REGISTERED

## 2024-05-14 DEVICE — BULKAMID URETHRAL BULKING SYSTEM
Type: IMPLANTABLE DEVICE | Site: BLADDER | Status: FUNCTIONAL
Brand: BULKAMID

## 2024-05-14 RX ORDER — PROPOFOL 10 MG/ML
INJECTION, EMULSION INTRAVENOUS CONTINUOUS PRN
Status: DISCONTINUED | OUTPATIENT
Start: 2024-05-14 | End: 2024-05-14

## 2024-05-14 RX ORDER — SODIUM CHLORIDE, SODIUM LACTATE, POTASSIUM CHLORIDE, CALCIUM CHLORIDE 600; 310; 30; 20 MG/100ML; MG/100ML; MG/100ML; MG/100ML
100 INJECTION, SOLUTION INTRAVENOUS CONTINUOUS
Status: DISCONTINUED | OUTPATIENT
Start: 2024-05-14 | End: 2024-05-14 | Stop reason: HOSPADM

## 2024-05-14 RX ORDER — PHENAZOPYRIDINE HYDROCHLORIDE 100 MG/1
100 TABLET, FILM COATED ORAL 3 TIMES DAILY PRN
Qty: 20 TABLET | Refills: 0 | Status: SHIPPED | OUTPATIENT
Start: 2024-05-14 | End: 2024-06-07 | Stop reason: ALTCHOICE

## 2024-05-14 RX ORDER — FENTANYL CITRATE 50 UG/ML
INJECTION, SOLUTION INTRAMUSCULAR; INTRAVENOUS AS NEEDED
Status: DISCONTINUED | OUTPATIENT
Start: 2024-05-14 | End: 2024-05-14

## 2024-05-14 RX ORDER — LIDOCAINE HYDROCHLORIDE 20 MG/ML
INJECTION, SOLUTION EPIDURAL; INFILTRATION; INTRACAUDAL; PERINEURAL AS NEEDED
Status: DISCONTINUED | OUTPATIENT
Start: 2024-05-14 | End: 2024-05-14

## 2024-05-14 RX ORDER — HYDRALAZINE HYDROCHLORIDE 20 MG/ML
5 INJECTION INTRAMUSCULAR; INTRAVENOUS EVERY 30 MIN PRN
Status: DISCONTINUED | OUTPATIENT
Start: 2024-05-14 | End: 2024-05-14 | Stop reason: HOSPADM

## 2024-05-14 RX ORDER — ONDANSETRON HYDROCHLORIDE 2 MG/ML
4 INJECTION, SOLUTION INTRAVENOUS ONCE AS NEEDED
Status: DISCONTINUED | OUTPATIENT
Start: 2024-05-14 | End: 2024-05-14 | Stop reason: HOSPADM

## 2024-05-14 RX ORDER — CEFAZOLIN 1 G/1
INJECTION, POWDER, FOR SOLUTION INTRAVENOUS AS NEEDED
Status: DISCONTINUED | OUTPATIENT
Start: 2024-05-14 | End: 2024-05-14

## 2024-05-14 RX ORDER — MIDAZOLAM HYDROCHLORIDE 1 MG/ML
INJECTION INTRAMUSCULAR; INTRAVENOUS AS NEEDED
Status: DISCONTINUED | OUTPATIENT
Start: 2024-05-14 | End: 2024-05-14

## 2024-05-14 RX ORDER — OXYCODONE HYDROCHLORIDE 5 MG/1
5 TABLET ORAL EVERY 4 HOURS PRN
Status: DISCONTINUED | OUTPATIENT
Start: 2024-05-14 | End: 2024-05-14 | Stop reason: HOSPADM

## 2024-05-14 RX ORDER — SODIUM CHLORIDE 0.9 G/100ML
IRRIGANT IRRIGATION AS NEEDED
Status: DISCONTINUED | OUTPATIENT
Start: 2024-05-14 | End: 2024-05-14 | Stop reason: HOSPADM

## 2024-05-14 RX ORDER — PROPOFOL 10 MG/ML
INJECTION, EMULSION INTRAVENOUS AS NEEDED
Status: DISCONTINUED | OUTPATIENT
Start: 2024-05-14 | End: 2024-05-14

## 2024-05-14 RX ADMIN — CEFAZOLIN 2 G: 1 INJECTION, POWDER, FOR SOLUTION INTRAMUSCULAR; INTRAVENOUS at 10:40

## 2024-05-14 RX ADMIN — MIDAZOLAM HYDROCHLORIDE 0.5 MG: 1 INJECTION, SOLUTION INTRAMUSCULAR; INTRAVENOUS at 10:33

## 2024-05-14 RX ADMIN — LIDOCAINE HYDROCHLORIDE 20 MG: 20 INJECTION, SOLUTION EPIDURAL; INFILTRATION; INTRACAUDAL; PERINEURAL at 10:40

## 2024-05-14 RX ADMIN — PROPOFOL 100 MCG/KG/MIN: 10 INJECTION, EMULSION INTRAVENOUS at 10:40

## 2024-05-14 RX ADMIN — PROPOFOL 50 MG: 10 INJECTION, EMULSION INTRAVENOUS at 10:40

## 2024-05-14 RX ADMIN — FENTANYL CITRATE 25 MCG: 50 INJECTION, SOLUTION INTRAMUSCULAR; INTRAVENOUS at 10:33

## 2024-05-14 RX ADMIN — SODIUM CHLORIDE, POTASSIUM CHLORIDE, SODIUM LACTATE AND CALCIUM CHLORIDE 100 ML/HR: 600; 310; 30; 20 INJECTION, SOLUTION INTRAVENOUS at 10:11

## 2024-05-14 SDOH — HEALTH STABILITY: MENTAL HEALTH: CURRENT SMOKER: 0

## 2024-05-14 ASSESSMENT — PAIN - FUNCTIONAL ASSESSMENT
PAIN_FUNCTIONAL_ASSESSMENT: UNABLE TO SELF-REPORT
PAIN_FUNCTIONAL_ASSESSMENT: 0-10

## 2024-05-14 ASSESSMENT — COLUMBIA-SUICIDE SEVERITY RATING SCALE - C-SSRS
1. IN THE PAST MONTH, HAVE YOU WISHED YOU WERE DEAD OR WISHED YOU COULD GO TO SLEEP AND NOT WAKE UP?: NO
2. HAVE YOU ACTUALLY HAD ANY THOUGHTS OF KILLING YOURSELF?: NO
6. HAVE YOU EVER DONE ANYTHING, STARTED TO DO ANYTHING, OR PREPARED TO DO ANYTHING TO END YOUR LIFE?: NO

## 2024-05-14 ASSESSMENT — PAIN SCALES - GENERAL
PAINLEVEL_OUTOF10: 0 - NO PAIN

## 2024-05-14 NOTE — INTERVAL H&P NOTE
H&P reviewed. The patient was examined and there are no changes to the H&P.  Plan for Bulkamid urethral injections for her stress urinary incontinence complaints.

## 2024-05-14 NOTE — OP NOTE
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]     Procedures  Cystoscopy; Bulkamid Injection  08257 - [place] NDSC NJX IMPLT MATRL URT&/BLDR NCK      Surgeons      * Ignacio Montoya - Primary    Resident/Fellow/Other Assistant:  Surgeons and Role:  * No surgeons found with a matching role *    Procedure Summary  Anesthesia: Monitor Anesthesia Care  ASA: II  Anesthesia Staff: Anesthesiologist: Miriam Greene MD  CRNA: KINJAL Moran  Estimated Blood Loss: 0 mL  Intra-op Medications: Administrations occurring from 1130 to 1230 on 24:  * No intraprocedure medications in log *        Specimen: No specimens collected     Staff:   Circulator: Sharri FOX RN  Relief Circulator: Bhakti Almazan RN  Scrub Person: Deja Salinas         Drains and/or Catheters: * None in log *    Tourniquet Times:         Implants:  Implants       Type Name Action Serial No.      Implant BULKING SYSTEM, BULKAMID URETHRAL - [id number] Implanted               Findings: Normal-appearing bladder mucosa with ureteral orifices in normal orthotopic position.  Excellent coaptation of the mid urethra with Bulkamid utilizing 1.8 cc of the product.  12 [place] pediatric catheter was passed without difficulty at the completion of the case.    Indications: Manjula Ríos is an 78 y.o. female who is having surgery for Female stress incontinence [N39.3] and intrinsic sphincter deficiency.  She has had bothersome stress urinary incontinence complaints and has been counseled regarding her options.  She has had success with a pessary but desires definitive surgical management.  She is elected to proceed with Bulkamid therapy.    The patient was  seen in the preoperative area. The risks, benefits, complications, treatment options, non-operative alternatives, expected recovery and outcomes were discussed with the patient. The possibilities of reaction to medication, pulmonary aspiration, injury to surrounding structures, bleeding, recurrent infection, the need for additional procedures, failure to diagnose a condition, and creating a complication requiring transfusion or operation were discussed with the patient. The patient concurred with the proposed plan, giving informed consent.  The site of surgery was properly noted/marked if necessary per policy. The patient has been actively warmed in preoperative area. Preoperative antibiotics have been ordered and given within 1 hours of incision. Venous thrombosis prophylaxis are not indicated.    Procedure Details: After consent was signed and placed in the chart the patient was taken to the operating room where anesthesia was found be adequate.  The patient was prepared and draped in normal sterile fashion in dorsolithotomy position in yellowfin stirrups.  The Bulkamid camera was then introduced into the bladder with the findings noted above.  Excellent visualization was obtained.  Using the markings on the Bulkamid needle 2 cm from the bladder neck was measured and at the 7 to 8 o'clock position the first injection was made with coaptation of this area.  The needle was then swung over to the 4 to 5 o'clock position and a second injection made.  2 more injections were made at the 1 to 2 o'clock position and 10 to 11 o'clock position.  A second syringe was utilized to fill in the gaps between these cushions with excellent coaptation.  A total of 1.8 cc was utilized.  The camera was then removed and a 12 [place] pediatric catheter used to drain half of the fluid out of the bladder.  The catheter was removed and the procedure was terminated.    Sponge lap needle counts correct x 2.  The patient was taken to the  postanesthesia care unit in stable condition.    Complications:  None; patient tolerated the procedure well.    Disposition: PACU - hemodynamically stable.  Condition: stable         Additional Details:     Attending Attestation:     Ignacio Montoya  Phone Number: [phone]

## 2024-05-14 NOTE — ANESTHESIA POSTPROCEDURE EVALUATION
Patient: Manjula Ríos    Procedure Summary       Date: 05/14/24 Room / Location: [facility] A OR 01 / Virtual U A OR    Anesthesia Start: 1025 Anesthesia Stop: 1107    Procedure: Cystoscopy; Bulkamid Injection Diagnosis:       Female stress incontinence      Intrinsic sphincter deficiency      (Female stress incontinence [N39.3])    Surgeons: Ignacio Montoya MD Responsible Provider: Miriam Greene MD    Anesthesia Type: MAC ASA Status: 2            Anesthesia Type: MAC    Vitals Value Taken Time   /62 05/14/24 1130   Temp 36 °C (96.8 °F) 05/14/24 1102   Pulse 63 05/14/24 1130   Resp 15 05/14/24 1130   SpO2 97 % 05/14/24 1130       Anesthesia Post Evaluation    Patient location during evaluation: PACU  Patient participation: complete - patient participated  Level of consciousness: awake and alert  Pain management: adequate  Airway patency: patent  Cardiovascular status: stable  Respiratory status: spontaneous ventilation  Hydration status: acceptable  Postoperative Nausea and Vomiting: none        No notable events documented.

## 2024-05-15 ASSESSMENT — PAIN SCALES - GENERAL: PAINLEVEL_OUTOF10: 0 - NO PAIN

## 2024-05-31 ENCOUNTER — HOSPITAL ENCOUNTER (OUTPATIENT)
Dept: RADIOLOGY | Facility: EXTERNAL LOCATION | Age: 79
Discharge: HOME | End: 2024-05-31
Payer: MEDICARE

## 2024-05-31 DIAGNOSIS — J34.89 NOSE PAIN: ICD-10-CM

## 2024-06-06 NOTE — PROGRESS NOTES
Subjective   Patient ID: Manjula Ríos is a 78 y.o. female who presents for follow up.  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    The patient has noted significant improvements in her lower urinary tract symptoms.  She can do jumping jacks without leaking.  She does have episodic leaking with coughing although this is a minimal amount.  She does continue to note episodic urgency complaints in the morning typically associated with her cup of coffee.    She otherwise denies any vaginal complaints.  She denies any bowel related complaints.    She is overall very satisfied with her therapy.    From Previous note  78-year-old with symptomatic stage II anterior and apical pelvic organ prolapse, mild vaginal atrophy, and pelvic floor weakness having failed pessary management having undergone vaginal hysterectomy with bilateral salpingectomy, uterosacral ligament suspension, and perineorrhaphy 2/15/2024 with what postoperative urinary incontinence complaints having undergone normal cystoscopic evaluation and #2 ring with support and knob pessary was fitted 03/19 /2024.    The patient has been doing well, she does continue to note the pessary rubbing and some mild rectal discomfort with certain movements however this is not bothersome for her.  She denies any vaginal complaints, no abnormal bleeding or discharge.     She does note worsening urinary urgency, she voids 5-6 times during the day but has a sense of urgency with positional changes. She denies any UTI like  symptoms.    She denies any bowel related complaints, no fecal or flatal incontinence.    She has no other complaints.    From Previous note  78-year-old with symptomatic stage II anterior and apical pelvic organ prolapse, mild vaginal atrophy, and pelvic floor weakness having failed pessary management having undergone vaginal hysterectomy with bilateral salpingectomy, uterosacral ligament suspension, and perineorrhaphy 2/15/2024 with what postoperative urinary incontinence complaints having undergone normal cystoscopic evaluation  and #2 ring with support and knob pessary was fitted 03/19 / 2024.    The patient notes that she is having episodic rubbing and burning with the pessary. She denies any vaginal complaints, no abnormal bleeding or discharge. She reports that her leaking with movement has resolved with her pessary.     She denies any bowel related complaints, no fecal or flatal incontinence.    She has no other complaints.      From Previous note  78-year-old with symptomatic stage II anterior and apical pelvic organ prolapse, mild vaginal atrophy, and pelvic floor weakness having failed pessary management having undergone vaginal hysterectomy with bilateral salpingectomy, uterosacral ligament suspension, and perineorrhaphy 2/15/2024 with what postoperative urinary incontinence complaints presenting for cystoscopic evaluation today  03/19 / 2024.    She has no other complaints.     From Previous note  This visit was performed through telemedicine  78-year-old with symptomatic stage II anterior and apical pelvic organ prolapse, mild vaginal atrophy, and pelvic floor weakness having failed pessary management having undergone vaginal hysterectomy with bilateral salpingectomy, uterosacral ligament suspension, and perineorrhaphy 2/15/2024 with what appears to be occult stress urinary incontinence postoperative complaints presenting to discuss urodynamics    The patient continues to note worsening urinary  incontinence. She denies any episodes of nocturia or enuresis, she denies any terminal incontinence on the way to the bathroom in the morning. During the day every 2 hours she is wet. She appears to have spontaneous incontinence without even the sense of urge.     She denies any vaginal complaints, no abnormal bleeding or discharge.     She denies any bowel related complaints, no fecal or flatal incontinence.    She has no other complaints.     From Previous note  78-year-old with symptomatic stage II anterior and apical pelvic organ prolapse, mild vaginal atrophy, and pelvic floor weakness having failed pessary management having undergone vaginal hysterectomy with bilateral salpingectomy, uterosacral ligament suspension, and perineorrhaphy 2/15/2024 with what appears to be occult stress urinary incontinence postoperative complaints.     The patient continues to note worsening urinary incontinence. She appears to have spontaneous incontinence without even the sense of urge.  This appears to be stress-induced with movement.  She denies any regular episodes of nocturia but does wake up to empty her bladder.    She denies any vaginal complaints, no abnormal bleeding or discharge.     She denies any bowel related complaints, no fecal or flatal incontinence.    She has no other complaints.     From Previous note  78-year-old with symptomatic stage II anterior and apical pelvic organ prolapse, mild vaginal atrophy, and pelvic floor weakness having failed pessary management having undergone vaginal hysterectomy with bilateral salpingectomy, uterosacral ligament suspension, and perineorrhaphy 2/15/2024.     The patient  presents with worsening urinary incontinence. She utilized Oxybutynin but noted terrible dry mouth and throat complains. She also complained of  dizziness. She appears to have spontaneous incontinence without even the sense of urge.  This appears to be stress-induced with movement.  She denies any regular  episodes of nocturia but does wake up to empty her bladder. She voids every 2-3 hours and yet has episodes of incontinence. UA shows small leukocytes. PVR was 0.    She denies any bowel related complaints, no fecal or flatal incontinence.    She denies any vaginal complaints, no abnormal bleeding or discharge. She appears to be doing well from the surgery standpoint, she states that her pain is controlled.     She has no other complaints.    From Previous note   78-year-old with symptomatic stage II anterior and apical pelvic organ prolapse, mild vaginal atrophy, and pelvic floor weakness having failed pessary management having undergone vaginal hysterectomy with bilateral salpingectomy, uterosacral ligament suspension, and perineorrhaphy 2/15/2024.    The patient denies any vaginal complaints.  She denies any vaginal bulges.  She is having 2-3 regular soft bowel movements daily with the assistance of daily MiraLAX.  She denies any fecal incontinence.    However she has noted worsening urgency and frequency.  She notes episodic urinary leakage associated with and without urgency.  She denies any stress urinary incontinence complaints.  Urinalysis today appears negative.    The patient presents to discuss options for her pelvic organ prolapse. Surgical correction was discussed at length.    She continues to be bothered by her bulge.     She is continuing her vaginal estrogen therapy. She denies any vaginal discharge concerns at this time.     She denies any bowel related complaints, no fecal or flatal incontinence.     She has no other complaints.    From Previous note  78-year-old with symptomatic stage II anterior and apical pelvic organ prolapse, mild vaginal atrophy, and pelvic floor weakness having been fitted with a #4 ring with support and knob 5/16/2023 with persistent vaginal versus urinary leakage concerns having had her pessary removed 8/1/2023.    The patient has noted episodic urinary urgency with her  first void of the day.  She notes daytime frequency roughly every 3 hours.  She denies any stress urinary incontinence.  She denies any significant vaginal bulge complaints.  She is utilizing her vaginal estrogen therapy.  She denies any bowel related complaints.    She has no other complaints.    From Previous note  77-year-old with symptomatic stage II anterior and apical pelvic organ prolapse, mild vaginal atrophy, and pelvic floor weakness having been fitted with a #4 ring with support and knob 5/16/2023 with persistent vaginal versus urinary leakage concerns having had her pessary removed 8/1/2023 presenting for follow up.     The patient appears to be having excellent results without the pessary removed. She denies any abnormal vaginal discharge or bleeding. She only notes a vaginal bulge when doing jumping jacks or with a heavy cough. However this is easily reduced and this is not bothersome to her at this time.     She denies any worsening urinary urgency or frequency. She does have to rush to bathroom for her first void in the morning but that is not bothersome. She denies any episodes of nocturia or enuresis. She denies any UTI like symptoms.      She is continuing her vaginal estrogen therapy. She denies any vaginal discharge concerns at this time.     She denies any bowel related complaints, no fecal or flatal incontinence.     She has no other complaints.     From previous note   77-year-old with symptomatic stage II anterior and apical pelvic organ prolapse, mild vaginal atrophy, and pelvic floor weakness having been fitted with a #4 ring with support and knob 5/16/2023 with persistent vaginal versus urinary leakage concerns.     The patient reports increasing urinary urgency and frequency since the last visit. She states that she attempted to remove the pessary and noticed that she had increased leaking since then.      She denies any vaginal spotting. She states that she has been compliant with the  "topical estrogen and Trimo-Cornejo.      She denies constipation or any bowel-related symptoms.     She has no other complaints.      From previous note This visit was performed through telemedicine  77-year-old with symptomatic stage II anterior and apical pelvic organ prolapse, mild vaginal atrophy, and pelvic floor weakness having been fitted with a #4 ring with support and knob 5/16/2023 with persistent vaginal versus urinary leakage concerns.     Reports resolution of her malodorous urine s/p Trimo-Cornejo. Complains of persistent \"leakage\" that is vaginal vs urinary in nature, but she is not definitely sure, described as being more of a sensation. Last UDS was in June 2023. Reviewed UDS results again today, including being negative for leakage. Discussed option of removing her pessary at home vs in-office. She will schedule an appointment to have this removed.      She has no other complaints     From previous note  77-year-old with symptomatic stage II anterior and apical pelvic organ prolapse, mild vaginal atrophy, and pelvic floor weakness having been fitted with a #4 ring with support and knob 5/16/2023 with persistent urinary incontinence complaints.     The patient continues to note \"leakage\". She is wearing a pad and notes a \"yellowish\" leakage. She has proceeded with a phenazopyridine challenge and felt that this was bright orange in color and urine in nature. We discussed at length today her lower urinary tract symptoms. She denies any urgency or frequency. She notes urinating roughly every 4-5 hours during the daytime and notes 1 episode of nocturia. She denies any stress urinary incontinence. She is overall very satisfied with her pessary noting complete resolution of her vaginal bulge complaints. She is utilizing her vaginal estrogen therapy.     We discussed at length today my concerns that her discharge is vaginal in nature as opposed to urine.     She has no other complaints.     From previous " note  This visit was performed through telemedicine  77-year-old with symptomatic stage II anterior and apical pelvic organ prolapse, mild vaginal atrophy, and pelvic floor weakness having been fitted with a #4 ring with support and knob 5/16/2023.     The patient was utilizing the Oxytrol patch but noted severe fatigue and pain in her lower extremities. She states her symptoms resolved since she stopped utilizing it. It was noted that she has yellow urine on her pad with her phenazopyridine challenge. She denies any urgency but continues to leak on movement and laughing, coughing and sneezing. Bladder sling Surgery was discussed at length.      She denies any vaginal complaints, no abnormal vaginal bleeding or discharge.     She denies any bowel related complaints, no fecal or flatal incontinence.     She has no other complaints.     From previous note  This visit was performed through telemedicine  77-year-old with symptomatic stage II anterior and apical pelvic organ prolapse, mild vaginal atrophy, and pelvic floor weakness having been fitted with a #4 ring with support and knob 5/16/2023.     The patient presents to discuss her UDS test results, which demonstrates no filling defect, no bladder spasm, mild sensitivity, no obstruction and adequate emptying. The test does not demonstrate any signs of stress incontinence. she voids 4-5 times during the day but leaks in between and utilizes pads throughout the day, as she leaks with movement and at the sound of water. She denies any episodes of nocturia , however her pad is moist when she wakes up. She denies any UTI like symptoms.      She denies any vaginal complaints, no abnormal vaginal bleeding or discharge.     She denies any bowel related complaints, no fecal or flatal incontinence.     She has no other complaints.     From previous note  77-year-old with symptomatic stage II anterior and apical pelvic organ prolapse, mild vaginal atrophy, and pelvic floor  weakness having been fitted with a #4 ring with support and knob 5/16/2023.     The patient reports of noting moderate incontinence after the pessary placed followed by a gush on sneezing. She voids 3-4 times during the day but leaks in between and utilizes pads throughout the day, as she leaks with movement. She denies any episodes of nocturia , however her pad is moist when she wakes up. She denies any UTI like symptoms.      She does note near resolution of her bulge complaints with the pessary in place, however she is bothered by her lower urinary tract complaints. She denies any abnormal vaginal bleeding or discharge. She is utilizing the vaginal estrogen cream.     She denies any bowel related complaints, no fecal or flatal incontinence.     She has no other complaints.     From previous note  77-year-old with symptomatic stage II anterior and apical pelvic organ prolapse, mild vaginal atrophy, and pelvic floor weakness having been fitted with a #4 ring with support 4/25/2023.     The patient is satisfied from the pessary standpoint. She denies any vaginal complaints, no abnormal vaginal bleeding or discharge. She is utilizing the vaginal estrogen cream.     She complaints on leaking when she stands from sitting position. She does note leaking on laughing, cough or sneezing. She is noting 2-3 episodes of nocturia with urgency.     She denies any bowel related complaints, she did feel the pessary was pushing on her bowels but no fecal or flatal incontinence.     She has no other complaints.        From previous note  77-year-old with symptomatic stage II anterior and apical pelvic organ prolapse, mild vaginal atrophy, and pelvic floor weakness presenting for pessary fitting today 04/25/23.     She is utilizing the vaginal estrogen cream.      She has no other complaints.           From previous note  77- year-old patient presenting as a referral from Dr. Isbell with complaints of pelvic organ prolapse.     The  patient states she noticed a vaginal bulge last night in the shower, she denies any pressure or pulling sensation. She also denies any vaginal bleeding or discharge. She is sexually active and denies any vaginal complaints. She was previously seen by Dr. Isbell and Dr. Sepulveda for he gynecological care.     She also note urinary urgency and incontinence. She denies leaking on laughing, cough or sneezing. She denies any nocturia but denies any enuresis. She voids every hour during the day. She denies any history of nephrolithiasis, gross hematuria or chronic recurrent UTIs.      She complaints of constipation but has a bowel movement every morning, she has utilized MiraLAX in the past. She denies any fecal or flatal incontinence.      She has no other complaints.  Review of Systems    Objective   Physical Exam  Constitutional: No fever, No chills and No fatigue.   Eyes: No vision problems and No dryness of the eyes.   ENT: No dry mouth, No hearing loss and No nosebleeds.   Cardiovascular: No chest pain, No palpitations and No orthopnea.   Respiratory: No shortness of breath, No cough and No wheezing.   Gastrointestinal: No abdominal pain, No constipation, No nausea, No diarrhea, No vomiting and No melena.   Genitourinary: As noted in HPI.   Musculoskeletal: No back pain, No myalgias, No muscle weakness, No joint swelling and No leg edema.   Integumentary: No rashes, No skin lesion and No itching.   Neurological: No headache, No numbness and No dizziness.   Psychiatric: No sleep disturbances, No anxiety and No depression.   Endocrine: No hot flashes, No loss of hair and No hirsutism.   Hematologic/Lymphatic: No swollen glands, No tendency for easy bleeding and No tendency for easy bruising.   All other systems have been reviewed and are negative for complaint.     PHYSICAL EXAMINATION:  No LMP recorded (lmp unknown). Patient is postmenopausal.  There is no height or weight on file to calculate BMI.  LMP  (LMP  Unknown)   General Appearance: well appearing  Neuro: Alert and oriented   HEENT: mucous membranes moist, neck supple  Resp: No respiratory distress, normal work of breathing  MSK: normal range of motion, gait appropriate    Assessment/Plan     78-year-old with symptomatic stage II anterior and apical pelvic organ prolapse, mild vaginal atrophy, and pelvic floor weakness having failed pessary management having undergone vaginal hysterectomy with bilateral salpingectomy, uterosacral ligament suspension, and perineorrhaphy 2/15/2024 with what postoperative urinary incontinence complaints having undergone normal cystoscopic evaluation and #2 ring with support and knob pessary was fitted 03/19 /2024 s//p bulkamid 5/14/2024.     #1 patient is overall very satisfied from a prolapse standpoint.  She is released from all postoperative restrictions.    2. We have previously discussed the patient's vaginal atrophy. She will continue her vaginal estrogen therapy 3 times a week.       3.  She appears to be having excellent results following her Bulkamid therapy.  She is not interested in repeat Bulkamid at this time and wishes to continue this moving forward.  She does note episodic urgency complaints typically associated with coffee in the morning.  She had significant side effects associated with oxybutynin and has since stopped this medication.  Urodynamics was overall reassuring with a voided volume of 432 cc with no evidence of genuine stress urinary incontinence or DO.  We discussed the confusing nature of her complaints and her reassuring urodynamic findings.  Cystoscopic evaluation today 3/19/2024 demonstrated no concerns for a fistula or other bladder wall abnormalities explaining her incontinence complaints while standing and moving.  The patient was fitted with a #2 ring with support and knob pessary 3/19/2024.  She has noted resolution of her leaking complaints with this in place.  As above, she appears to be having  excellent results with her Bulkamid therapy.    4. The patient will follow-up yearly moving forward.  She will contact the clinic earlier should she desire any further therapy for her urgency complaints.     LAUREN Montoya MD     Scribe Attestation  By signing my name below, IB[employer]akti Scribe attest that this documentation has been prepared under the direction and in the presence of Ignacio Montoya MD. All medical record entries made by the Scribe were at my direction or personally dictated by me. I have reviewed the chart and agree that the record accurately reflects my personal performance of the history, physical exam, discussion and plan.

## 2024-06-07 ENCOUNTER — OFFICE VISIT (OUTPATIENT)
Dept: ENDOCRINOLOGY | Facility: CLINIC | Age: 79
End: 2024-06-07
Payer: MEDICARE

## 2024-06-07 VITALS
HEART RATE: 70 BPM | HEIGHT: 58 IN | BODY MASS INDEX: 20.91 KG/M2 | DIASTOLIC BLOOD PRESSURE: 70 MMHG | SYSTOLIC BLOOD PRESSURE: 120 MMHG | WEIGHT: 99.6 LBS | RESPIRATION RATE: 16 BRPM

## 2024-06-07 DIAGNOSIS — E55.9 VITAMIN D DEFICIENCY: Primary | ICD-10-CM

## 2024-06-07 DIAGNOSIS — M81.0 OSTEOPOROSIS WITHOUT CURRENT PATHOLOGICAL FRACTURE, UNSPECIFIED OSTEOPOROSIS TYPE: ICD-10-CM

## 2024-06-07 PROCEDURE — 1159F MED LIST DOCD IN RCRD: CPT | Performed by: INTERNAL MEDICINE

## 2024-06-07 PROCEDURE — 1036F TOBACCO NON-USER: CPT | Performed by: INTERNAL MEDICINE

## 2024-06-07 PROCEDURE — 1160F RVW MEDS BY RX/DR IN RCRD: CPT | Performed by: INTERNAL MEDICINE

## 2024-06-07 PROCEDURE — 99213 OFFICE O/P EST LOW 20 MIN: CPT | Performed by: INTERNAL MEDICINE

## 2024-06-07 ASSESSMENT — ENCOUNTER SYMPTOMS
HEADACHES: 0
PALPITATIONS: 0
CHILLS: 0
DIARRHEA: 0
FATIGUE: 0
COUGH: 0
VOMITING: 0
FEVER: 0
SHORTNESS OF BREATH: 0
NAUSEA: 0

## 2024-06-07 NOTE — PROGRESS NOTES
Endocrinology: Follow up visit  Subjective   Patient ID: Manjula Ríos is a 78 y.o. female who presents for No chief complaint on file..    PCP: Lobo Landa MD    HPI  Since last visit doing fine with prolia  Forteo intolerant  Has had a series of health issues:   Hysterectomy, bladder surgery, cataracts    Review of Systems   Constitutional:  Negative for chills, fatigue and fever.   Respiratory:  Negative for cough and shortness of breath.    Cardiovascular:  Negative for chest pain and palpitations.   Gastrointestinal:  Negative for diarrhea, nausea and vomiting.   Neurological:  Negative for headaches.       Patient Active Problem List   Diagnosis    Arthritis of knee, right    Bursitis of pelvic region    Chronic maxillary sinusitis    Female pelvic pain    Greater trochanteric bursitis of both hips    Loss of sense of smell    Low back pain    Nonspecific syndrome suggestive of viral illness    Other osteoporosis without current pathological fracture    Pain of both hip joints    Right knee pain    Right shoulder pain    Vaginal discharge    Urge incontinence    Pelvic floor weakness    Female stress incontinence    Female bladder prolapse    Cystocele with uterine descensus    Atrophic vaginitis    Uterovaginal prolapse    S/P hysterectomy    Pure hypercholesterolemia    GERD (gastroesophageal reflux disease)    Kyphoscoliosis    Hyperlipidemia    Lung nodules        Home Meds:  Current Outpatient Medications   Medication Instructions    ascorbic acid (VITAMIN C) 500 mg, oral, Daily    ascorbic acid/collagen hydr (COLLAGEN SKIN RENEWAL ORAL) 1 Dose, oral, Daily    biotin 10 mg tablet oral    CALCIUM ORAL 750 mg, oral    cholecalciferol (Vitamin D-3) 50 MCG (2000 UT) tablet oral    chromium picolinate 1,000 mcg tablet oral    Cinnamon 500 mg capsule oral    coenzyme Q-10 100 mg, oral, Last taken 5/6    cyanocobalamin, vitamin B-12, (Vitamin B-12) 1,000 mcg tablet extended release oral    denosumab  (PROLIA) 60 mg, subcutaneous, Every 6 months    DIETARY SUPPLEMENT ORAL 1 Dose, oral, Daily, PROBIOTIC    ELDERBERRY FRUIT ORAL 1 Dose, oral, Daily    estradiol (Estrace) 0.01 % (0.1 mg/gram) vaginal cream vaginal, 3 times weekly    EVENING PRIMROSE OIL ORAL 1 Dose, oral, Daily    fluticasone (Flonase) 50 mcg/actuation nasal spray 1 spray, Each Nostril, Daily, Shake gently. Before first use, prime pump. After use, clean tip and replace cap.    multivitamin (Daily Multi-Vitamin) tablet oral    omega-3 (Fish OiL) 300-1,000 mg capsule 1,000 mg, oral, 2 times daily, 750 mg    phenazopyridine (PYRIDIUM) 100 mg, oral, 3 times daily PRN    polydextrose/vitamin B complex (FIBER GUMMIES, WITH B-COMPLEX, ORAL) 1 tablet, oral, Nightly    polyethylene glycol (GLYCOLAX, MIRALAX) 17 g, oral, Daily    vit A/vit C/vit E/zinc/copper (PRESERVISION AREDS ORAL) 2 capsules, oral, Daily    zinc gluconate 50 mg tablet 50 mg of elemental zinc, oral, Daily        Allergies   Allergen Reactions    Cat Dander Shortness of breath    House Dust Shortness of breath    Oxybutynin Shortness of breath and Dry mouth    Niacin Syncope and Bleeding    Hay Fever And Allergy Relief Other    Naproxen GI bleeding and Other     bleeding    Rosuvastatin Other     Fatigue, brain fog, leg pain        Objective   Vitals:    06/07/24 1017   BP: 120/70   Pulse: 70   Resp: 16      Vitals:    06/07/24 1017   Weight: 45.2 kg (99 lb 9.6 oz)      Body mass index is 20.82 kg/m².   Physical Exam  Constitutional:       Appearance: Normal appearance. She is overweight.   HENT:      Head: Normocephalic and atraumatic.   Neck:      Thyroid: No thyroid mass, thyromegaly or thyroid tenderness.   Cardiovascular:      Rate and Rhythm: Normal rate and regular rhythm.      Heart sounds: No murmur heard.     No gallop.   Pulmonary:      Effort: Pulmonary effort is normal.      Breath sounds: Normal breath sounds.   Abdominal:      Palpations: Abdomen is soft.      Comments:  "benign   Neurological:      General: No focal deficit present.      Mental Status: She is alert and oriented to person, place, and time.      Deep Tendon Reflexes: Reflexes are normal and symmetric.   Psychiatric:         Behavior: Behavior is cooperative.         Labs:  No results found for: \"CMPLAS\", \"HGBA1C\", \"A1CTI\", \"LDLDIRECT\", \"TSH\", \"FREET4\", \"OKKRO3S\"   No results found for: \"PR1\", \"THYROIDPAB\", \"TSI\"     Assessment/Plan   Problem List Items Addressed This Visit    None  Visit Diagnoses       Vitamin D deficiency    -  Primary    Osteoporosis without current pathological fracture, unspecified osteoporosis type              Discussed course  Doing well on prolia  Follow up in one year  Dexa due 2/2025    Electronically signed by:  Leticia Barney MD 06/07/24 11:01 AM              "

## 2024-06-10 ENCOUNTER — OFFICE VISIT (OUTPATIENT)
Dept: UROLOGY | Facility: CLINIC | Age: 79
End: 2024-06-10
Payer: MEDICARE

## 2024-06-10 VITALS
DIASTOLIC BLOOD PRESSURE: 77 MMHG | HEART RATE: 55 BPM | SYSTOLIC BLOOD PRESSURE: 126 MMHG | TEMPERATURE: 97.6 F | BODY MASS INDEX: 20.61 KG/M2 | HEIGHT: 58 IN | WEIGHT: 98.2 LBS

## 2024-06-10 DIAGNOSIS — N39.3 FEMALE STRESS INCONTINENCE: Primary | ICD-10-CM

## 2024-06-10 DIAGNOSIS — Z09 POSTOP CHECK: ICD-10-CM

## 2024-06-10 DIAGNOSIS — N81.4 CYSTOCELE WITH UTERINE DESCENSUS: ICD-10-CM

## 2024-06-10 DIAGNOSIS — N39.41 URGE INCONTINENCE: ICD-10-CM

## 2024-06-10 DIAGNOSIS — N81.89 PELVIC FLOOR WEAKNESS: ICD-10-CM

## 2024-06-10 LAB
POC APPEARANCE, URINE: CLEAR
POC BILIRUBIN, URINE: NEGATIVE
POC BLOOD, URINE: NEGATIVE
POC COLOR, URINE: YELLOW
POC GLUCOSE, URINE: NEGATIVE MG/DL
POC KETONES, URINE: NEGATIVE MG/DL
POC LEUKOCYTES, URINE: NEGATIVE
POC NITRITE,URINE: NEGATIVE
POC PH, URINE: 6 PH
POC PROTEIN, URINE: NEGATIVE MG/DL
POC SPECIFIC GRAVITY, URINE: 1.02
POC UROBILINOGEN, URINE: 0.2 EU/DL

## 2024-06-10 PROCEDURE — 1036F TOBACCO NON-USER: CPT | Performed by: OBSTETRICS & GYNECOLOGY

## 2024-06-10 PROCEDURE — 99024 POSTOP FOLLOW-UP VISIT: CPT | Performed by: OBSTETRICS & GYNECOLOGY

## 2024-06-10 PROCEDURE — 1160F RVW MEDS BY RX/DR IN RCRD: CPT | Performed by: OBSTETRICS & GYNECOLOGY

## 2024-06-10 PROCEDURE — 81003 URINALYSIS AUTO W/O SCOPE: CPT | Performed by: OBSTETRICS & GYNECOLOGY

## 2024-06-10 PROCEDURE — 1159F MED LIST DOCD IN RCRD: CPT | Performed by: OBSTETRICS & GYNECOLOGY

## 2024-06-10 NOTE — PATIENT INSTRUCTIONS
Please follow-up yearly moving forward.    Please contact clinic should you have any worsening lower urinary tract or vaginal complaints.    [phone]

## 2024-08-05 ENCOUNTER — LAB (OUTPATIENT)
Dept: LAB | Facility: LAB | Age: 79
End: 2024-08-05
Payer: MEDICARE

## 2024-08-05 DIAGNOSIS — M81.8 OTHER OSTEOPOROSIS WITHOUT CURRENT PATHOLOGICAL FRACTURE: ICD-10-CM

## 2024-08-05 LAB
ALBUMIN SERPL BCP-MCNC: 3.8 G/DL (ref 3.4–5)
ALP SERPL-CCNC: 35 U/L (ref 33–136)
ALT SERPL W P-5'-P-CCNC: 21 U/L (ref 7–45)
ANION GAP SERPL CALC-SCNC: 8 MMOL/L (ref 10–20)
AST SERPL W P-5'-P-CCNC: 24 U/L (ref 9–39)
BILIRUB SERPL-MCNC: 0.7 MG/DL (ref 0–1.2)
BUN SERPL-MCNC: 22 MG/DL (ref 6–23)
CALCIUM SERPL-MCNC: 9.8 MG/DL (ref 8.6–10.3)
CHLORIDE SERPL-SCNC: 105 MMOL/L (ref 98–107)
CO2 SERPL-SCNC: 30 MMOL/L (ref 21–32)
CREAT SERPL-MCNC: 0.71 MG/DL (ref 0.5–1.05)
EGFRCR SERPLBLD CKD-EPI 2021: 88 ML/MIN/1.73M*2
GLUCOSE SERPL-MCNC: 90 MG/DL (ref 74–99)
POTASSIUM SERPL-SCNC: 4.4 MMOL/L (ref 3.5–5.3)
PROT SERPL-MCNC: 6.3 G/DL (ref 6.4–8.2)
SODIUM SERPL-SCNC: 139 MMOL/L (ref 136–145)

## 2024-08-05 PROCEDURE — 80053 COMPREHEN METABOLIC PANEL: CPT

## 2024-08-05 PROCEDURE — 36415 COLL VENOUS BLD VENIPUNCTURE: CPT

## 2024-08-06 ENCOUNTER — TELEPHONE (OUTPATIENT)
Dept: UROLOGY | Facility: CLINIC | Age: 79
End: 2024-08-06

## 2024-08-26 ENCOUNTER — APPOINTMENT (OUTPATIENT)
Dept: INFUSION THERAPY | Facility: CLINIC | Age: 79
End: 2024-08-26
Payer: MEDICARE

## 2024-08-26 VITALS
DIASTOLIC BLOOD PRESSURE: 65 MMHG | HEART RATE: 55 BPM | OXYGEN SATURATION: 97 % | SYSTOLIC BLOOD PRESSURE: 103 MMHG | TEMPERATURE: 97.4 F | RESPIRATION RATE: 16 BRPM

## 2024-08-26 DIAGNOSIS — M81.8 OTHER OSTEOPOROSIS WITHOUT CURRENT PATHOLOGICAL FRACTURE: ICD-10-CM

## 2024-08-26 PROCEDURE — 96372 THER/PROPH/DIAG INJ SC/IM: CPT | Performed by: NURSE PRACTITIONER

## 2024-08-26 RX ORDER — DIPHENHYDRAMINE HYDROCHLORIDE 50 MG/ML
50 INJECTION INTRAMUSCULAR; INTRAVENOUS AS NEEDED
OUTPATIENT
Start: 2025-02-20

## 2024-08-26 RX ORDER — ALBUTEROL SULFATE 0.83 MG/ML
3 SOLUTION RESPIRATORY (INHALATION) AS NEEDED
OUTPATIENT
Start: 2025-02-20

## 2024-08-26 RX ORDER — FAMOTIDINE 10 MG/ML
20 INJECTION INTRAVENOUS ONCE AS NEEDED
OUTPATIENT
Start: 2025-02-20

## 2024-08-26 RX ORDER — EPINEPHRINE 0.3 MG/.3ML
0.3 INJECTION SUBCUTANEOUS EVERY 5 MIN PRN
OUTPATIENT
Start: 2025-02-20

## 2024-08-26 ASSESSMENT — ENCOUNTER SYMPTOMS
WOUND: 0
WHEEZING: 0
DIZZINESS: 0
LEG SWELLING: 0
SHORTNESS OF BREATH: 0
COUGH: 0
NUMBNESS: 0
LIGHT-HEADEDNESS: 0
EXTREMITY WEAKNESS: 0
PALPITATIONS: 0

## 2024-08-26 NOTE — PROGRESS NOTES
[facility]   Infusion Clinic Note   Date: 2024   Name: Manjula Ríos  : 1945   MRN: [id number]          Reason for Visit: Injections (Every 6 months Prolia 60mg subcutaneous injection)         Today: We administered denosumab.       Visit Type: INJECTION       Ordered By: Leticia Barney MD       Accompanied by:Self       Diagnosis: Other osteoporosis without current pathological fracture        Allergies:   Allergies as of 2024 - Reviewed 2024   Allergen Reaction Noted    Cat dander Shortness of breath 2018    House dust Shortness of breath 2018    Oxybutynin Shortness of breath and Dry mouth 2024    Niacin Syncope and Bleeding 2024    Hay fever and allergy relief Other 2022    Naproxen GI bleeding and Other 2020    Rosuvastatin Other 2023          Current Medications has a current medication list which includes the following prescription(s): ascorbic acid, ascorbic acid/collagen hydr, biotin, calcium, cholecalciferol, chromium picolinate, cinnamon, coenzyme q-10, cyanocobalamin (vitamin b-12), denosumab, dietary supplement, elderberry fruit, estradiol, evening primrose oil, fluticasone, daily multi-vitamin, omega-3, polydextrose/vitamin b complex, polyethylene glycol, vit a/vit c/vit e/zinc/copper, and zinc gluconate.       Vitals:   Vitals:    24 1306   BP: 103/65   Pulse: 55   Resp: 16   Temp: 36.3 °C (97.4 °F)   TempSrc: Temporal   SpO2: 97%             Infusion Pre-procedure Checklist:   - Allergies reviewed: yes   - Medications reviewed: yes       - Previous reaction to current treatment: no      Assess patient for the concerns below. Document provider notification as appropriate.  - Active or recent infection with/without current antibiotic use: no  - Recent or planned invasive dental work: no  - Recent or planned surgeries: no  - Recently received or plans to receive vaccinations: no  - Has treatment related  toxicities: no  - Is pregnant:  no      Pain: 0   - Is the pain different from normal: n/a   - Is the pain tolerable: n/a   - Is your Doctor aware:  n/a                Fall Risk Screening: Jayjay Fall Risk  History of Falling, Immediate or Within 3 Months: No  Ambulatory Aid: Walks without aid/bedrest/nurse assist  Intravenous Therapy/Heparin Lock: No  Gait/Transferring: Normal/bedrest/immobile  Mental Status: Oriented to own ability       Review Of Systems:  Review of Systems   Respiratory:  Negative for cough, shortness of breath and wheezing.    Cardiovascular:  Negative for chest pain, leg swelling and palpitations.   Skin:  Negative for itching, rash and wound.   Neurological:  Negative for dizziness, extremity weakness, light-headedness and numbness.         ROS completed? Yes      Infusion Readiness:  - Assessment Concerns Related to Infusion: No  - Provider notified: n/a      Document Below Only If Indicated:   New Patient Education:    N/A (returning patient for continuation of therapy. Ongoing education provided as needed.)        Treatment Conditions & Drug Specific Questions:    Denosumab  (PROLIA. XGEVA)    (Unless otherwise specified on patient specific therapy plan):     TREATMENT CONDITIONS:  Unless otherwise specified on patient specific therapy plan HOLD and notify provider prior to proceeding with today's injection if patients:  o Calcium is LESS THAN 8.6 mg/dL OR  Ionized Calcium LESS THAN 1.1 mmol/L  o Recent or planned invasive dental procedure (within 4 weeks)    Lab Results   Component Value Date    CALCIUM 9.8 08/05/2024    PHOS 3.8 12/06/2018      Labs reviewed and patient meets treatment conditions? Yes    DRUG SPECIFIC QUESTIONS:  Is the patient taking calcium and vitamin D? Yes  (Recommended)    Pt Instructed on following risks: (1) hypocalcemia, (2) osteonecrosis of the jaw, (3) atypical femoral fractures, (4) serious infections, and (5) dermatologic reactions?   Yes      REMINDER:  PREGNANCY CATEGORY X DRUG. OBTAIN NEGTATIVE PREGNANCY TEST PRIOR TO FIRST INFUSION FOR WOMEN OF CHILDBEARING ABILITY   REMS DRUG    Recommended Vitals/Observation:  Vitals: Obtain vitals prior to injection.  Observation: Patient may leave immediately following injection.        Weight Based Drug Calculations:    WEIGHT BASED DRUGS: NOT APPLICABLE / FLAT DOSE          Initiated By: Mary Elias LPN

## 2024-08-26 NOTE — PATIENT INSTRUCTIONS
Today :We administered denosumab. Prolia 60mg subcutaneous injection left upper arm  Blood Calcium within 28 days of next Prolia appointment    For:   1. Other osteoporosis without current pathological fracture       Your next appointment is due in: 6 months        Please read the  Medication Guide that was given to you and reviewed during todays visit.     (Tell all doctors including dentists that you are taking this medication)     Go to the emergency room or call 911 if:  -You have signs of allergic reaction:   -Rash, hives, itching.   -Swollen, blistered, peeling skin.   -Swelling of face, lips, mouth, tongue or throat.   -Tightness of chest, trouble breathing, swallowing or talking     Call your doctor:  - If injection site gets red, warm, swollen, itchy or leaks fluid or pus.     (Leave band aid on your injection site for at least 2 hours and keep area clean and dry  - If you get sick or have symptoms of infection or are not feeling well for any reason.    (Wash your hands often, stay away from people who are sick)  - If you have side effects from your medication that do not go away or are bothersome.     (Refer to the teaching your nurse gave you for side effects to call your doctor about)    - Common side effects may include:  stuffy nose, headache, feeling tired, muscle aches, upset stomach  - Before receiving any vaccines     - Call [facility] at   If:  - You get sick, are on antibiotics, have had a recent vaccine, have surgery or dental work and your doctor wants your visit rescheduled.  - You need to cancel and reschedule your visit for any reason. Call at least 2 days before your visit if you need to cancel.   - Your insurance changes before your next visit.    (We will need to get approval from your new insurance. This can take up to two weeks.)     The [facility] is opened Monday thru Friday. We are closed on weekends and holidays.   Voice mail will take your call  if the center is closed. If you leave a message please allow 24 hours for a call back during weekdays. If you leave a message on a weekend/holiday, we will call you back the next business day.

## 2024-09-03 ENCOUNTER — OFFICE VISIT (OUTPATIENT)
Dept: UROLOGY | Facility: CLINIC | Age: 79
End: 2024-09-03
Payer: MEDICARE

## 2024-09-03 VITALS
BODY MASS INDEX: 20.31 KG/M2 | DIASTOLIC BLOOD PRESSURE: 74 MMHG | HEART RATE: 57 BPM | SYSTOLIC BLOOD PRESSURE: 136 MMHG | WEIGHT: 97.2 LBS

## 2024-09-03 DIAGNOSIS — N81.4 CYSTOCELE WITH UTERINE DESCENSUS: ICD-10-CM

## 2024-09-03 DIAGNOSIS — N81.89 PELVIC FLOOR WEAKNESS: ICD-10-CM

## 2024-09-03 DIAGNOSIS — Z09 POSTOP CHECK: ICD-10-CM

## 2024-09-03 DIAGNOSIS — N39.41 URGE INCONTINENCE: ICD-10-CM

## 2024-09-03 DIAGNOSIS — N39.3 FEMALE STRESS INCONTINENCE: Primary | ICD-10-CM

## 2024-09-03 LAB
POC APPEARANCE, URINE: ABNORMAL
POC BILIRUBIN, URINE: NEGATIVE
POC BLOOD, URINE: NEGATIVE
POC COLOR, URINE: YELLOW
POC GLUCOSE, URINE: NEGATIVE MG/DL
POC KETONES, URINE: NEGATIVE MG/DL
POC LEUKOCYTES, URINE: NEGATIVE
POC NITRITE,URINE: NEGATIVE
POC PH, URINE: 7.5 PH
POC PROTEIN, URINE: NEGATIVE MG/DL
POC SPECIFIC GRAVITY, URINE: 1.02
POC UROBILINOGEN, URINE: 0.2 EU/DL

## 2024-09-03 PROCEDURE — 1036F TOBACCO NON-USER: CPT | Performed by: OBSTETRICS & GYNECOLOGY

## 2024-09-03 PROCEDURE — 1160F RVW MEDS BY RX/DR IN RCRD: CPT | Performed by: OBSTETRICS & GYNECOLOGY

## 2024-09-03 PROCEDURE — 81003 URINALYSIS AUTO W/O SCOPE: CPT | Mod: QW | Performed by: OBSTETRICS & GYNECOLOGY

## 2024-09-03 PROCEDURE — 99213 OFFICE O/P EST LOW 20 MIN: CPT | Performed by: OBSTETRICS & GYNECOLOGY

## 2024-09-03 PROCEDURE — G2211 COMPLEX E/M VISIT ADD ON: HCPCS | Performed by: OBSTETRICS & GYNECOLOGY

## 2024-09-03 PROCEDURE — 1159F MED LIST DOCD IN RCRD: CPT | Performed by: OBSTETRICS & GYNECOLOGY

## 2024-09-03 NOTE — PROGRESS NOTES
Subjective   Patient ID: Manjula Ríos is a 78 y.o. female who presents for follow up.  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    The patient is noting urinary incontinence particularly when she wakes up in the morning and attempts to make it to the bathroom but experiences terminal incontinence. She notes rare 1 episodes of nocturia but denies any enuresis.  She has some mild urgency during the daytime but denies any urge related incontinence during the day and night.  She denies any stress urinary incontinence concerns.    She denies any vaginal complaints, no abnormal bleeding or discharge.     She denies any bowel related complaints, no fecal or flatal incontinence.    She has no other complaints.    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    The patient has noted significant improvements in her lower urinary tract symptoms.  She can do jumping jacks without leaking.  She does have episodic leaking with coughing although this is a minimal amount.  She does continue to note episodic urgency complaints in the  morning typically associated with her cup of coffee.    She otherwise denies any vaginal complaints.  She denies any bowel related complaints.    She is overall very satisfied with her therapy.    From Previous note  78-year-old with symptomatic stage II anterior and apical pelvic organ prolapse, mild vaginal atrophy, and pelvic floor weakness having failed pessary management having undergone vaginal hysterectomy with bilateral salpingectomy, uterosacral ligament suspension, and perineorrhaphy 2/15/2024 with what postoperative urinary incontinence complaints having undergone normal cystoscopic evaluation and #2 ring with support and knob pessary was fitted 03/19 /2024.    The patient has been doing well, she does continue to note the pessary rubbing and some mild rectal discomfort with certain movements however this is not bothersome for her.  She denies any vaginal complaints, no abnormal bleeding or discharge.     She does note worsening urinary urgency, she voids 5-6 times during the day but has a sense of urgency with positional changes. She denies any UTI like symptoms.    She denies any bowel related complaints, no fecal or flatal incontinence.    She has no other complaints.    From Previous note  78-year-old with symptomatic stage II anterior and apical pelvic organ prolapse, mild vaginal atrophy, and pelvic floor weakness having failed pessary management having undergone vaginal hysterectomy with bilateral salpingectomy, uterosacral ligament suspension, and perineorrhaphy 2/15/2024 with what postoperative urinary incontinence complaints having undergone normal cystoscopic evaluation  and #2 ring with support and knob pessary was fitted 03/19 / 2024.    The patient notes that she is having episodic rubbing and burning with the pessary. She denies any vaginal complaints, no abnormal bleeding or discharge. She reports that her leaking with movement has resolved with her pessary.     She denies any bowel  related complaints, no fecal or flatal incontinence.    She has no other complaints.      From Previous note  78-year-old with symptomatic stage II anterior and apical pelvic organ prolapse, mild vaginal atrophy, and pelvic floor weakness having failed pessary management having undergone vaginal hysterectomy with bilateral salpingectomy, uterosacral ligament suspension, and perineorrhaphy 2/15/2024 with what postoperative urinary incontinence complaints presenting for cystoscopic evaluation today  03/19 / 2024.    She has no other complaints.     From Previous note  This visit was performed through telemedicine  78-year-old with symptomatic stage II anterior and apical pelvic organ prolapse, mild vaginal atrophy, and pelvic floor weakness having failed pessary management having undergone vaginal hysterectomy with bilateral salpingectomy, uterosacral ligament suspension, and perineorrhaphy 2/15/2024 with what appears to be occult stress urinary incontinence postoperative complaints presenting to discuss urodynamics    The patient continues to note worsening urinary incontinence. She denies any episodes of nocturia or enuresis, she denies any terminal incontinence on the way to the bathroom in the morning. During the day every 2 hours she is wet. She appears to have spontaneous incontinence without even the sense of urge.     She denies any vaginal complaints, no abnormal bleeding or discharge.     She denies any bowel related complaints, no fecal or flatal incontinence.    She has no other complaints.     From Previous note  78-year-old with symptomatic stage II anterior and apical pelvic organ prolapse, mild vaginal atrophy, and pelvic floor weakness having failed pessary management having undergone vaginal hysterectomy with bilateral salpingectomy, uterosacral ligament suspension, and perineorrhaphy 2/15/2024 with what appears to be occult stress urinary incontinence postoperative complaints.     The patient  continues to note worsening urinary incontinence. She appears to have spontaneous incontinence without even the sense of urge.  This appears to be stress-induced with movement.  She denies any regular episodes of nocturia but does wake up to empty her bladder.    She denies any vaginal complaints, no abnormal bleeding or discharge.     She denies any bowel related complaints, no fecal or flatal incontinence.    She has no other complaints.     From Previous note  78-year-old with symptomatic stage II anterior and apical pelvic organ prolapse, mild vaginal atrophy, and pelvic floor weakness having failed pessary management having undergone vaginal hysterectomy with bilateral salpingectomy, uterosacral ligament suspension, and perineorrhaphy 2/15/2024.     The patient  presents with worsening urinary incontinence. She utilized Oxybutynin but noted terrible dry mouth and throat complains. She also complained of  dizziness. She appears to have spontaneous incontinence without even the sense of urge.  This appears to be stress-induced with movement.  She denies any regular episodes of nocturia but does wake up to empty her bladder. She voids every 2-3 hours and yet has episodes of incontinence. UA shows small leukocytes. PVR was 0.    She denies any bowel related complaints, no fecal or flatal incontinence.    She denies any vaginal complaints, no abnormal bleeding or discharge. She appears to be doing well from the surgery standpoint, she states that her pain is controlled.     She has no other complaints.    From Previous note   78-year-old with symptomatic stage II anterior and apical pelvic organ prolapse, mild vaginal atrophy, and pelvic floor weakness having failed pessary management having undergone vaginal hysterectomy with bilateral salpingectomy, uterosacral ligament suspension, and perineorrhaphy 2/15/2024.    The patient denies any vaginal complaints.  She denies any vaginal bulges.  She is having 2-3 regular  soft bowel movements daily with the assistance of daily MiraLAX.  She denies any fecal incontinence.    However she has noted worsening urgency and frequency.  She notes episodic urinary leakage associated with and without urgency.  She denies any stress urinary incontinence complaints.  Urinalysis today appears negative.    The patient presents to discuss options for her pelvic organ prolapse. Surgical correction was discussed at length.    She continues to be bothered by her bulge.     She is continuing her vaginal estrogen therapy. She denies any vaginal discharge concerns at this time.     She denies any bowel related complaints, no fecal or flatal incontinence.     She has no other complaints.    From Previous note  78-year-old with symptomatic stage II anterior and apical pelvic organ prolapse, mild vaginal atrophy, and pelvic floor weakness having been fitted with a #4 ring with support and knob 5/16/2023 with persistent vaginal versus urinary leakage concerns having had her pessary removed 8/1/2023.    The patient has noted episodic urinary urgency with her first void of the day.  She notes daytime frequency roughly every 3 hours.  She denies any stress urinary incontinence.  She denies any significant vaginal bulge complaints.  She is utilizing her vaginal estrogen therapy.  She denies any bowel related complaints.    She has no other complaints.    From Previous note  77-year-old with symptomatic stage II anterior and apical pelvic organ prolapse, mild vaginal atrophy, and pelvic floor weakness having been fitted with a #4 ring with support and knob 5/16/2023 with persistent vaginal versus urinary leakage concerns having had her pessary removed 8/1/2023 presenting for follow up.     The patient appears to be having excellent results without the pessary removed. She denies any abnormal vaginal discharge or bleeding. She only notes a vaginal bulge when doing jumping jacks or with a heavy cough. However this  "is easily reduced and this is not bothersome to her at this time.     She denies any worsening urinary urgency or frequency. She does have to rush to bathroom for her first void in the morning but that is not bothersome. She denies any episodes of nocturia or enuresis. She denies any UTI like symptoms.      She is continuing her vaginal estrogen therapy. She denies any vaginal discharge concerns at this time.     She denies any bowel related complaints, no fecal or flatal incontinence.     She has no other complaints.     From previous note   77-year-old with symptomatic stage II anterior and apical pelvic organ prolapse, mild vaginal atrophy, and pelvic floor weakness having been fitted with a #4 ring with support and knob 5/16/2023 with persistent vaginal versus urinary leakage concerns.     The patient reports increasing urinary urgency and frequency since the last visit. She states that she attempted to remove the pessary and noticed that she had increased leaking since then.      She denies any vaginal spotting. She states that she has been compliant with the topical estrogen and Trimo-Cornejo.      She denies constipation or any bowel-related symptoms.     She has no other complaints.      From previous note This visit was performed through telemedicine  77-year-old with symptomatic stage II anterior and apical pelvic organ prolapse, mild vaginal atrophy, and pelvic floor weakness having been fitted with a #4 ring with support and knob 5/16/2023 with persistent vaginal versus urinary leakage concerns.     Reports resolution of her malodorous urine s/p Trimo-Cornejo. Complains of persistent \"leakage\" that is vaginal vs urinary in nature, but she is not definitely sure, described as being more of a sensation. Last UDS was in June 2023. Reviewed UDS results again today, including being negative for leakage. Discussed option of removing her pessary at home vs in-office. She will schedule an appointment to have this " "removed.      She has no other complaints     From previous note  77-year-old with symptomatic stage II anterior and apical pelvic organ prolapse, mild vaginal atrophy, and pelvic floor weakness having been fitted with a #4 ring with support and knob 5/16/2023 with persistent urinary incontinence complaints.     The patient continues to note \"leakage\". She is wearing a pad and notes a \"yellowish\" leakage. She has proceeded with a phenazopyridine challenge and felt that this was bright orange in color and urine in nature. We discussed at length today her lower urinary tract symptoms. She denies any urgency or frequency. She notes urinating roughly every 4-5 hours during the daytime and notes 1 episode of nocturia. She denies any stress urinary incontinence. She is overall very satisfied with her pessary noting complete resolution of her vaginal bulge complaints. She is utilizing her vaginal estrogen therapy.     We discussed at length today my concerns that her discharge is vaginal in nature as opposed to urine.     She has no other complaints.     From previous note  This visit was performed through telemedicine  77-year-old with symptomatic stage II anterior and apical pelvic organ prolapse, mild vaginal atrophy, and pelvic floor weakness having been fitted with a #4 ring with support and knob 5/16/2023.     The patient was utilizing the Oxytrol patch but noted severe fatigue and pain in her lower extremities. She states her symptoms resolved since she stopped utilizing it. It was noted that she has yellow urine on her pad with her phenazopyridine challenge. She denies any urgency but continues to leak on movement and laughing, coughing and sneezing. Bladder sling Surgery was discussed at length.      She denies any vaginal complaints, no abnormal vaginal bleeding or discharge.     She denies any bowel related complaints, no fecal or flatal incontinence.     She has no other complaints.     From previous note  This " visit was performed through telemedicine  77-year-old with symptomatic stage II anterior and apical pelvic organ prolapse, mild vaginal atrophy, and pelvic floor weakness having been fitted with a #4 ring with support and knob 5/16/2023.     The patient presents to discuss her UDS test results, which demonstrates no filling defect, no bladder spasm, mild sensitivity, no obstruction and adequate emptying. The test does not demonstrate any signs of stress incontinence. she voids 4-5 times during the day but leaks in between and utilizes pads throughout the day, as she leaks with movement and at the sound of water. She denies any episodes of nocturia , however her pad is moist when she wakes up. She denies any UTI like symptoms.      She denies any vaginal complaints, no abnormal vaginal bleeding or discharge.     She denies any bowel related complaints, no fecal or flatal incontinence.     She has no other complaints.     From previous note  77-year-old with symptomatic stage II anterior and apical pelvic organ prolapse, mild vaginal atrophy, and pelvic floor weakness having been fitted with a #4 ring with support and knob 5/16/2023.     The patient reports of noting moderate incontinence after the pessary placed followed by a gush on sneezing. She voids 3-4 times during the day but leaks in between and utilizes pads throughout the day, as she leaks with movement. She denies any episodes of nocturia , however her pad is moist when she wakes up. She denies any UTI like symptoms.      She does note near resolution of her bulge complaints with the pessary in place, however she is bothered by her lower urinary tract complaints. She denies any abnormal vaginal bleeding or discharge. She is utilizing the vaginal estrogen cream.     She denies any bowel related complaints, no fecal or flatal incontinence.     She has no other complaints.     From previous note  77-year-old with symptomatic stage II anterior and apical pelvic  organ prolapse, mild vaginal atrophy, and pelvic floor weakness having been fitted with a #4 ring with support 4/25/2023.     The patient is satisfied from the pessary standpoint. She denies any vaginal complaints, no abnormal vaginal bleeding or discharge. She is utilizing the vaginal estrogen cream.     She complaints on leaking when she stands from sitting position. She does note leaking on laughing, cough or sneezing. She is noting 2-3 episodes of nocturia with urgency.     She denies any bowel related complaints, she did feel the pessary was pushing on her bowels but no fecal or flatal incontinence.     She has no other complaints.        From previous note  77-year-old with symptomatic stage II anterior and apical pelvic organ prolapse, mild vaginal atrophy, and pelvic floor weakness presenting for pessary fitting today 04/25/23.     She is utilizing the vaginal estrogen cream.      She has no other complaints.           From previous note  77- year-old patient presenting as a referral from Dr. Isbell with complaints of pelvic organ prolapse.     The patient states she noticed a vaginal bulge last night in the shower, she denies any pressure or pulling sensation. She also denies any vaginal bleeding or discharge. She is sexually active and denies any vaginal complaints. She was previously seen by Dr. Isbell and Dr. Sepulveda for he gynecological care.     She also note urinary urgency and incontinence. She denies leaking on laughing, cough or sneezing. She denies any nocturia but denies any enuresis. She voids every hour during the day. She denies any history of nephrolithiasis, gross hematuria or chronic recurrent UTIs.      She complaints of constipation but has a bowel movement every morning, she has utilized MiraLAX in the past. She denies any fecal or flatal incontinence.      She has no other complaints.  Review of Systems    Objective   Physical Exam  Constitutional: No fever, No chills and No fatigue.    Eyes: No vision problems and No dryness of the eyes.   ENT: No dry mouth, No hearing loss and No nosebleeds.   Cardiovascular: No chest pain, No palpitations and No orthopnea.   Respiratory: No shortness of breath, No cough and No wheezing.   Gastrointestinal: No abdominal pain, No constipation, No nausea, No diarrhea, No vomiting and No melena.   Genitourinary: As noted in HPI.   Musculoskeletal: No back pain, No myalgias, No muscle weakness, No joint swelling and No leg edema.   Integumentary: No rashes, No skin lesion and No itching.   Neurological: No headache, No numbness and No dizziness.   Psychiatric: No sleep disturbances, No anxiety and No depression.   Endocrine: No hot flashes, No loss of hair and No hirsutism.   Hematologic/Lymphatic: No swollen glands, No tendency for easy bleeding and No tendency for easy bruising.   All other systems have been reviewed and are negative for complaint.     PHYSICAL EXAMINATION:  No LMP recorded (lmp unknown). Patient is postmenopausal.  There is no height or weight on file to calculate BMI.  LMP  (LMP Unknown)   General Appearance: well appearing  Neuro: Alert and oriented   HEENT: mucous membranes moist, neck supple  Resp: No respiratory distress, normal work of breathing  MSK: normal range of motion, gait appropriate    Assessment/Plan     78-year-old with symptomatic stage II anterior and apical pelvic organ prolapse, mild vaginal atrophy, and pelvic floor weakness having failed pessary management having undergone vaginal hysterectomy with bilateral salpingectomy, uterosacral ligament suspension, and perineorrhaphy 2/15/2024 with what postoperative urinary incontinence complaints having undergone normal cystoscopic evaluation with a history of pessary use having undergone bulkamid 5/14/2024.     #1 patient is overall very satisfied from a prolapse standpoint.      2. We have previously discussed the patient's vaginal atrophy. She will continue her vaginal  estrogen therapy 3 times a week.       3.  She appears to be having excellent results following her Bulkamid therapy.  She is not interested in repeat Bulkamid at this time and wishes to continue this moving forward.  She does note episodic urgency complaints typically associated with coffee in the morning.  She had significant side effects associated with oxybutynin and has since stopped this medication.  Urodynamics was overall reassuring with a voided volume of 432 cc with no evidence of genuine stress urinary incontinence or DO.  We discussed the confusing nature of her complaints and her reassuring urodynamic findings.  Cystoscopic evaluation 3/19/2024 demonstrated no concerns for a fistula or other bladder wall abnormalities explaining her incontinence complaints while standing and moving.  As above, she appears to be having excellent results with her Bulkamid therapy.    #4 we discussed the patient's urinary urgency associated with her first void of the day.  We discussed her pelvic floor weakness and proceeding with pelvic floor physical therapy.  She was provided a referral as well as a list of providers.    5. The patient will follow-up yearly moving forward.  She will contact the clinic earlier should she desire any further therapy for her urgency complaints.     LAUREN Montoya MD     Scribe Attestation  By signing my name below, [employer] Scribe attest that this documentation has been prepared under the direction and in the presence of Ignacio Montoya MD. All medical record entries made by the Scribe were at my direction or personally dictated by me. I have reviewed the chart and agree that the record accurately reflects my personal performance of the history, physical exam, discussion and plan.

## 2024-09-03 NOTE — PATIENT INSTRUCTIONS
Please follow-up with pelvic floor physical therapy at your earliest convenience. You have been provided a referral as well as a list of providers.     Please use MiraLAX daily to help with your constipation. Please titrate this to a soft bowel movement every day. Please use a fiber supplement that can include Metamucil, Citrucel, FiberCon, fiber biscuits, or fiber Gummies as well.     Please follow-up as needed.    Please contact clinic should you have any worsening lower urinary tract or vaginal complaints.    [phone]

## 2024-09-18 ENCOUNTER — EVALUATION (OUTPATIENT)
Dept: PHYSICAL THERAPY | Facility: CLINIC | Age: 79
End: 2024-09-18
Payer: MEDICARE

## 2024-09-18 DIAGNOSIS — N39.41 URGE INCONTINENCE: ICD-10-CM

## 2024-09-18 DIAGNOSIS — N81.89 PELVIC FLOOR WEAKNESS: ICD-10-CM

## 2024-09-18 PROCEDURE — 97161 PT EVAL LOW COMPLEX 20 MIN: CPT | Mod: GP

## 2024-09-18 PROCEDURE — 97535 SELF CARE MNGMENT TRAINING: CPT | Mod: GP

## 2024-09-18 ASSESSMENT — ENCOUNTER SYMPTOMS
OCCASIONAL FEELINGS OF UNSTEADINESS: 0
DEPRESSION: 0
LOSS OF SENSATION IN FEET: 0

## 2024-09-18 ASSESSMENT — PATIENT HEALTH QUESTIONNAIRE - PHQ9
1. LITTLE INTEREST OR PLEASURE IN DOING THINGS: NOT AT ALL
2. FEELING DOWN, DEPRESSED OR HOPELESS: NOT AT ALL
SUM OF ALL RESPONSES TO PHQ9 QUESTIONS 1 AND 2: 0

## 2024-09-18 ASSESSMENT — PAIN - FUNCTIONAL ASSESSMENT: PAIN_FUNCTIONAL_ASSESSMENT: 0-10

## 2024-09-18 ASSESSMENT — PAIN SCALES - GENERAL: PAINLEVEL_OUTOF10: 0 - NO PAIN

## 2024-09-18 NOTE — PROGRESS NOTES
Physical Therapy    EVALUATION AND TREATMENT    Name: Manjula Ríos  MRN: [id number]  : 1945  Today's Date: 24     Time Calculation  Start Time: 1100  Stop Time: 1144  Time Calculation (min): 44 min    Assessment:    Pt presenting to the clinic with worsening urinary urge incontinence since her hysterectomy in April. She has a complicated pmh of prolapse and failed pessary management resulting in surgery. Since that time, she is frustrated with losing mild to moderate amounts of urine with urgency. She is also concerned about the coordination of her pelvic floor. She has fairly good contraction and support as well as higher level recreational gym activity. She could benefit from psychological retraining and/or pelvic girdle dynamic stabilization to increase her quality of life.     Insurance:  Visit number: 1  Insurance Type: Medicare   Approved # of visits:  n/a  Authorization Needed: no  Cert Date Ends On: 24    Plan:   PT Plan: Skilled PT  PT Frequency: 1 time per week  Duration: 12 weeks  Rehab Potential: Excellent  Plan of Care Agreement: Patient  Planned interventions include: biofeedback, cryotherapy, education/instruction, electrical stimulation, gait training, home program, hot pack, kinesiotaping, manual therapy, neuromuscular re-education, self care/home management, therapeutic activities, and therapeutic exercises.   -Overcoming the urge   -Bladder irritant   -Pt cannot come back to therapy until  recovers from knee replacement surgery and will likely resume therapy at that point     Current Problem:  1. Urge incontinence  Referral to Physical Therapy      2. Pelvic floor weakness  Referral to Physical Therapy          Subjective   General:  Reason for Referral: Urinary urge incontinence  Referred By: Dr. Montoya  S/p Vaginal hysterectomy with bilateral salpingectomy, uterosacral ligament suspension,and perineorrphaphy 2/15/24, s/p bulkamid injection 24.  She has tried a  pessary in the past, now removed. Now with constipation. Taking miralax and fiber gummies. She uses vaginal estrogen 3 days per week  Primary complaint: urinary urgency with pulling in the driveway, water running. She has some leakage on the way to the bathroom. She wears a liner at night time. She is feeling increased vaginal pressure at times, intermittently. This happens about 4 times per week.   Day time frequency: 4   Night time frequency: 0     Pt primary goal: increase strengthening   Precautions:  Precautions Comment: none     PMH: hypercholesterolemia, HLD, osteoporosis, GERD,  Fall Risk: no  Pain:  Pain Assessment: 0-10  0-10 (Numeric) Pain Score: 0 - No pain    Objective   External and internal assessment explained. Verbal consent obtained to proceed with vaginal or rectal exam and consented for the treatment approaches today. Patient understands they have power and right to stop examination at any time.     External Vaginal Observation:  Voluntary Contraction: +, needs VC for submaximal contraction   Voluntary Relaxation:+  Involuntary Contraction: +  Involuntary Relaxation:  +  Minimal descent at rest and with curl up  Appropriate tissue color and hydration  Minimal signs of atrophy     Vaginal palpation external: no trigger points   1 o'clock (ischiocavernosus)  2 o'clock (bulbocavernosus)  3 o'clock (superficial transverse perineal)  4 o'clock (pubococcygeus)  5 o'clock (iliococcygeus)  6 o'clock (coccyx)  7 o'clock (iliococcygeus)  8 o'clock (pubococcygeus)  9 o'clock (superficial transverse perineal)  10 o'clock (bulbocavernosus)  11 o'clock (ischiocavernosus)  12 o'clock (pubic symphysis inferior angle)  Obturator:  Piriformis:    Vaginal palpation internal:  1 o'clock (ischiocavernosus)  2 o'clock (bulbocavernosus)  3 o'clock (superficial transverse perineal)  4 o'clock (pubococcygeus)  5 o'clock (iliococcygeus)  6 o'clock (coccyx)  7 o'clock (iliococcygeus)  8 o'clock (pubococcygeus)  9 o'clock  (superficial transverse perineal)  10 o'clock (bulbocavernosus)  11 o'clock (ischiocavernosus)  12 o'clock (pubic symphysis inferior angle)  Obturator: L  Piriformis: L     Pelvic Floor MMT Grade  0/zero: no palpable contraction/squeeze  1/trace: flicker of squeeze or contraction  2/poor: squeeze pressure asymmetrical or felt at various points- no lift or displacement  3/fair: squeeze pressure (contraction) and lift or displacement  4/good: squeeze pressure (contraction) and lift or displacement from anterior, posterior, and side walls  5/strong: full circumference of finger compressed, displaced with an inward pull    [place] PERF(Power/Endurance/Repetitions/Fast Twitch)  3/4/3/5    Ortho Screen:  AROM:    PROM/Joint Mobility:  Increased LLE tightness, decreased hip rotation   Decreased L > R hamstring length   Strength:    Special Tests:    Palpation:    Observation:   No obvious gait deviations  SL stance:    Outcome Measure:   NIH CPSI pain 0 NIH CPSI urinary 2 NIH CPSI quality of life 5     Careplan Goals:  1. Pt will be independent in HEP to maximize PT POC   2. Pt will be able to improve worst pain severity on NPRS by >2 points MCID   3. Pt will improve NIH CPSI by >50% raw score    Yajaira Dorsey, PT

## 2024-11-22 NOTE — PROGRESS NOTES
Subjective   Patient ID: Manjula Ríos is a 78 y.o. female who presents for follow up.  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    The patient is reporting urinary incontinence/leakage early in the morning. She states that when she gets up in the morning, she has difficulty holding the urine and ends up leaking. She sleeps throughout the night without waking up for urination at night-time. The patient also feels episodic vaginal prolapse/bulging. In terms of urinary symptoms, urinary leakage.     She reports constipation and taking fiber supplements and Miralax.     She has no other complaints.      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    The patient is noting urinary incontinence particularly when she wakes up in the morning and attempts to make it to the bathroom but experiences terminal incontinence. She notes rare 1 episodes of nocturia but denies any enuresis.  She has some mild urgency during the daytime but denies any urge related incontinence during the day and night.  She denies any stress urinary incontinence concerns.    She  denies any vaginal complaints, no abnormal bleeding or discharge.     She denies any bowel related complaints, no fecal or flatal incontinence.    She has no other complaints.    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    The patient has noted significant improvements in her lower urinary tract symptoms.  She can do jumping jacks without leaking.  She does have episodic leaking with coughing although this is a minimal amount.  She does continue to note episodic urgency complaints in the morning typically associated with her cup of coffee.    She otherwise denies any vaginal complaints.  She denies any bowel related complaints.    She is overall very satisfied with her therapy.    From Previous note  78-year-old with symptomatic stage II anterior and apical pelvic organ prolapse, mild vaginal atrophy, and pelvic floor weakness having failed pessary management having undergone vaginal hysterectomy with bilateral salpingectomy, uterosacral ligament suspension, and perineorrhaphy 2/15/2024 with what postoperative urinary incontinence complaints having undergone normal cystoscopic evaluation and #2 ring with support and knob pessary was fitted 03/19 /2024.    The patient has been doing well, she does continue to note the pessary rubbing and some mild rectal discomfort with certain movements however this is not bothersome for her.  She denies any vaginal complaints, no abnormal bleeding or discharge.     She does note worsening urinary urgency, she voids 5-6 times  during the day but has a sense of urgency with positional changes. She denies any UTI like symptoms.    She denies any bowel related complaints, no fecal or flatal incontinence.    She has no other complaints.    From Previous note  78-year-old with symptomatic stage II anterior and apical pelvic organ prolapse, mild vaginal atrophy, and pelvic floor weakness having failed pessary management having undergone vaginal hysterectomy with bilateral salpingectomy, uterosacral ligament suspension, and perineorrhaphy 2/15/2024 with what postoperative urinary incontinence complaints having undergone normal cystoscopic evaluation  and #2 ring with support and knob pessary was fitted 03/19 / 2024.    The patient notes that she is having episodic rubbing and burning with the pessary. She denies any vaginal complaints, no abnormal bleeding or discharge. She reports that her leaking with movement has resolved with her pessary.     She denies any bowel related complaints, no fecal or flatal incontinence.    She has no other complaints.      From Previous note  78-year-old with symptomatic stage II anterior and apical pelvic organ prolapse, mild vaginal atrophy, and pelvic floor weakness having failed pessary management having undergone vaginal hysterectomy with bilateral salpingectomy, uterosacral ligament suspension, and perineorrhaphy 2/15/2024 with what postoperative urinary incontinence complaints presenting for cystoscopic evaluation today  03/19 / 2024.    She has no other complaints.     From Previous note  This visit was performed through telemedicine  78-year-old with symptomatic stage II anterior and apical pelvic organ prolapse, mild vaginal atrophy, and pelvic floor weakness having failed pessary management having undergone vaginal hysterectomy with bilateral salpingectomy, uterosacral ligament suspension, and perineorrhaphy 2/15/2024 with what appears to be occult stress urinary incontinence postoperative complaints  presenting to discuss urodynamics    The patient continues to note worsening urinary incontinence. She denies any episodes of nocturia or enuresis, she denies any terminal incontinence on the way to the bathroom in the morning. During the day every 2 hours she is wet. She appears to have spontaneous incontinence without even the sense of urge.     She denies any vaginal complaints, no abnormal bleeding or discharge.     She denies any bowel related complaints, no fecal or flatal incontinence.    She has no other complaints.     From Previous note  78-year-old with symptomatic stage II anterior and apical pelvic organ prolapse, mild vaginal atrophy, and pelvic floor weakness having failed pessary management having undergone vaginal hysterectomy with bilateral salpingectomy, uterosacral ligament suspension, and perineorrhaphy 2/15/2024 with what appears to be occult stress urinary incontinence postoperative complaints.     The patient continues to note worsening urinary incontinence. She appears to have spontaneous incontinence without even the sense of urge.  This appears to be stress-induced with movement.  She denies any regular episodes of nocturia but does wake up to empty her bladder.    She denies any vaginal complaints, no abnormal bleeding or discharge.     She denies any bowel related complaints, no fecal or flatal incontinence.    She has no other complaints.     From Previous note  78-year-old with symptomatic stage II anterior and apical pelvic organ prolapse, mild vaginal atrophy, and pelvic floor weakness having failed pessary management having undergone vaginal hysterectomy with bilateral salpingectomy, uterosacral ligament suspension, and perineorrhaphy 2/15/2024.     The patient  presents with worsening urinary incontinence. She utilized Oxybutynin but noted terrible dry mouth and throat complains. She also complained of  dizziness. She appears to have spontaneous incontinence without even the sense  of urge.  This appears to be stress-induced with movement.  She denies any regular episodes of nocturia but does wake up to empty her bladder. She voids every 2-3 hours and yet has episodes of incontinence. UA shows small leukocytes. PVR was 0.    She denies any bowel related complaints, no fecal or flatal incontinence.    She denies any vaginal complaints, no abnormal bleeding or discharge. She appears to be doing well from the surgery standpoint, she states that her pain is controlled.     She has no other complaints.    From Previous note   78-year-old with symptomatic stage II anterior and apical pelvic organ prolapse, mild vaginal atrophy, and pelvic floor weakness having failed pessary management having undergone vaginal hysterectomy with bilateral salpingectomy, uterosacral ligament suspension, and perineorrhaphy 2/15/2024.    The patient denies any vaginal complaints.  She denies any vaginal bulges.  She is having 2-3 regular soft bowel movements daily with the assistance of daily MiraLAX.  She denies any fecal incontinence.    However she has noted worsening urgency and frequency.  She notes episodic urinary leakage associated with and without urgency.  She denies any stress urinary incontinence complaints.  Urinalysis today appears negative.    The patient presents to discuss options for her pelvic organ prolapse. Surgical correction was discussed at length.    She continues to be bothered by her bulge.     She is continuing her vaginal estrogen therapy. She denies any vaginal discharge concerns at this time.     She denies any bowel related complaints, no fecal or flatal incontinence.     She has no other complaints.    From Previous note  78-year-old with symptomatic stage II anterior and apical pelvic organ prolapse, mild vaginal atrophy, and pelvic floor weakness having been fitted with a #4 ring with support and knob 5/16/2023 with persistent vaginal versus urinary leakage concerns having had her  pessary removed 8/1/2023.    The patient has noted episodic urinary urgency with her first void of the day.  She notes daytime frequency roughly every 3 hours.  She denies any stress urinary incontinence.  She denies any significant vaginal bulge complaints.  She is utilizing her vaginal estrogen therapy.  She denies any bowel related complaints.    She has no other complaints.    From Previous note  77-year-old with symptomatic stage II anterior and apical pelvic organ prolapse, mild vaginal atrophy, and pelvic floor weakness having been fitted with a #4 ring with support and knob 5/16/2023 with persistent vaginal versus urinary leakage concerns having had her pessary removed 8/1/2023 presenting for follow up.     The patient appears to be having excellent results without the pessary removed. She denies any abnormal vaginal discharge or bleeding. She only notes a vaginal bulge when doing jumping jacks or with a heavy cough. However this is easily reduced and this is not bothersome to her at this time.     She denies any worsening urinary urgency or frequency. She does have to rush to bathroom for her first void in the morning but that is not bothersome. She denies any episodes of nocturia or enuresis. She denies any UTI like symptoms.      She is continuing her vaginal estrogen therapy. She denies any vaginal discharge concerns at this time.     She denies any bowel related complaints, no fecal or flatal incontinence.     She has no other complaints.     From previous note   77-year-old with symptomatic stage II anterior and apical pelvic organ prolapse, mild vaginal atrophy, and pelvic floor weakness having been fitted with a #4 ring with support and knob 5/16/2023 with persistent vaginal versus urinary leakage concerns.     The patient reports increasing urinary urgency and frequency since the last visit. She states that she attempted to remove the pessary and noticed that she had increased leaking since then.     "  She denies any vaginal spotting. She states that she has been compliant with the topical estrogen and Trimo-Cornejo.      She denies constipation or any bowel-related symptoms.     She has no other complaints.      From previous note This visit was performed through telemedicine  77-year-old with symptomatic stage II anterior and apical pelvic organ prolapse, mild vaginal atrophy, and pelvic floor weakness having been fitted with a #4 ring with support and knob 5/16/2023 with persistent vaginal versus urinary leakage concerns.     Reports resolution of her malodorous urine s/p Trimo-Cornejo. Complains of persistent \"leakage\" that is vaginal vs urinary in nature, but she is not definitely sure, described as being more of a sensation. Last UDS was in June 2023. Reviewed UDS results again today, including being negative for leakage. Discussed option of removing her pessary at home vs in-office. She will schedule an appointment to have this removed.      She has no other complaints     From previous note  77-year-old with symptomatic stage II anterior and apical pelvic organ prolapse, mild vaginal atrophy, and pelvic floor weakness having been fitted with a #4 ring with support and knob 5/16/2023 with persistent urinary incontinence complaints.     The patient continues to note \"leakage\". She is wearing a pad and notes a \"yellowish\" leakage. She has proceeded with a phenazopyridine challenge and felt that this was bright orange in color and urine in nature. We discussed at length today her lower urinary tract symptoms. She denies any urgency or frequency. She notes urinating roughly every 4-5 hours during the daytime and notes 1 episode of nocturia. She denies any stress urinary incontinence. She is overall very satisfied with her pessary noting complete resolution of her vaginal bulge complaints. She is utilizing her vaginal estrogen therapy.     We discussed at length today my concerns that her discharge is vaginal in " nature as opposed to urine.     She has no other complaints.     From previous note  This visit was performed through telemedicine  77-year-old with symptomatic stage II anterior and apical pelvic organ prolapse, mild vaginal atrophy, and pelvic floor weakness having been fitted with a #4 ring with support and knob 5/16/2023.     The patient was utilizing the Oxytrol patch but noted severe fatigue and pain in her lower extremities. She states her symptoms resolved since she stopped utilizing it. It was noted that she has yellow urine on her pad with her phenazopyridine challenge. She denies any urgency but continues to leak on movement and laughing, coughing and sneezing. Bladder sling Surgery was discussed at length.      She denies any vaginal complaints, no abnormal vaginal bleeding or discharge.     She denies any bowel related complaints, no fecal or flatal incontinence.     She has no other complaints.     From previous note  This visit was performed through telemedicine  77-year-old with symptomatic stage II anterior and apical pelvic organ prolapse, mild vaginal atrophy, and pelvic floor weakness having been fitted with a #4 ring with support and knob 5/16/2023.     The patient presents to discuss her UDS test results, which demonstrates no filling defect, no bladder spasm, mild sensitivity, no obstruction and adequate emptying. The test does not demonstrate any signs of stress incontinence. she voids 4-5 times during the day but leaks in between and utilizes pads throughout the day, as she leaks with movement and at the sound of water. She denies any episodes of nocturia , however her pad is moist when she wakes up. She denies any UTI like symptoms.      She denies any vaginal complaints, no abnormal vaginal bleeding or discharge.     She denies any bowel related complaints, no fecal or flatal incontinence.     She has no other complaints.     From previous note  77-year-old with symptomatic stage II  anterior and apical pelvic organ prolapse, mild vaginal atrophy, and pelvic floor weakness having been fitted with a #4 ring with support and knob 5/16/2023.     The patient reports of noting moderate incontinence after the pessary placed followed by a gush on sneezing. She voids 3-4 times during the day but leaks in between and utilizes pads throughout the day, as she leaks with movement. She denies any episodes of nocturia , however her pad is moist when she wakes up. She denies any UTI like symptoms.      She does note near resolution of her bulge complaints with the pessary in place, however she is bothered by her lower urinary tract complaints. She denies any abnormal vaginal bleeding or discharge. She is utilizing the vaginal estrogen cream.     She denies any bowel related complaints, no fecal or flatal incontinence.     She has no other complaints.     From previous note  77-year-old with symptomatic stage II anterior and apical pelvic organ prolapse, mild vaginal atrophy, and pelvic floor weakness having been fitted with a #4 ring with support 4/25/2023.     The patient is satisfied from the pessary standpoint. She denies any vaginal complaints, no abnormal vaginal bleeding or discharge. She is utilizing the vaginal estrogen cream.     She complaints on leaking when she stands from sitting position. She does note leaking on laughing, cough or sneezing. She is noting 2-3 episodes of nocturia with urgency.     She denies any bowel related complaints, she did feel the pessary was pushing on her bowels but no fecal or flatal incontinence.     She has no other complaints.        From previous note  77-year-old with symptomatic stage II anterior and apical pelvic organ prolapse, mild vaginal atrophy, and pelvic floor weakness presenting for pessary fitting today 04/25/23.     She is utilizing the vaginal estrogen cream.      She has no other complaints.           From previous note  77- year-old patient  presenting as a referral from Dr. Isbell with complaints of pelvic organ prolapse.     The patient states she noticed a vaginal bulge last night in the shower, she denies any pressure or pulling sensation. She also denies any vaginal bleeding or discharge. She is sexually active and denies any vaginal complaints. She was previously seen by Dr. Isbell and Dr. Sepulveda for he gynecological care.     She also note urinary urgency and incontinence. She denies leaking on laughing, cough or sneezing. She denies any nocturia but denies any enuresis. She voids every hour during the day. She denies any history of nephrolithiasis, gross hematuria or chronic recurrent UTIs.      She complaints of constipation but has a bowel movement every morning, she has utilized MiraLAX in the past. She denies any fecal or flatal incontinence.      She has no other complaints.  Review of Systems    Objective       PHYSICAL EXAMINATION:  No LMP recorded (lmp unknown). Patient is postmenopausal.  Body mass index is 20.8 kg/m².  /64   Pulse 58   Wt 45.1 kg (99 lb 8 oz)   LMP  (LMP Unknown)   BMI 20.80 kg/m²   General Appearance: well appearing  Neuro: Alert and oriented   HEENT: mucous membranes moist, neck supple  Resp: No respiratory distress, normal work of breathing  MSK: normal range of motion, gait appropriate    Pelvic:  Genitourinary:  normal external genitalia, Bartholin's glands negative, [place]'s glands negative  Urethra   normal meatus, non-tender, no periurethral mass  Vaginal mucosa  normal  Cervix surgically absent  Uterus surgically absent  Adnexae  negative nontender, no masses  Atrophy positive    CST negative  Pelvic floor muscle contraction  1/5, pain throughout exam    POP-Q (in supine position):       Aa -1     Ba -1     C -7              gh 3     pb 3     tvl 9              Ap -2     Bp -2     D     Rectal: Sclerosed hemorrhoids      Assessment/Plan     79-year-old with symptomatic stage II anterior and apical  pelvic organ prolapse, mild vaginal atrophy, and pelvic floor weakness having failed pessary management having undergone vaginal hysterectomy with bilateral salpingectomy, uterosacral ligament suspension, and perineorrhaphy 2/15/2024 with postoperative urinary incontinence complaints having undergone normal cystoscopic evaluation with a history of pessary use having undergone bulkamid 5/14/2024 with persistent urge urinary incontinence complaints.     #1 patient is overall very satisfied from a prolapse standpoint.  She has noted to have her anterior wall 2-1 on exam.    2. We have previously discussed the patient's vaginal atrophy. She will continue her vaginal estrogen therapy 3 times a week.       3.  She appears to be having excellent results following her Bulkamid therapy.  She is not interested in repeat Bulkamid at this time and wishes to continue this moving forward.  She does note episodic urgency complaints typically associated with coffee in the morning.  She had significant side effects associated with oxybutynin and has since stopped this medication.  Urodynamics was overall reassuring with a voided volume of 432 cc with no evidence of genuine stress urinary incontinence or DO.  We discussed the confusing nature of her complaints and her reassuring urodynamic findings.  Cystoscopic evaluation 3/19/2024 demonstrated no concerns for a fistula or other bladder wall abnormalities explaining her incontinence complaints while standing and moving.  As above, she appears to be having excellent results with her Bulkamid therapy.    #4 we discussed the patient's urinary urgency complaints.  She notes terminal urgency with her first void of the morning and during the daytime.  She cannot swallow pills.  We discussed pelvic floor physical therapy and she has been unable to follow-up.  We discussed third line options including PTNS and Botox therapy as well as neuromodulation.  The patient wishes to proceed with  Botox therapy and will be scheduled for 100 units at her earliest convenience.    5.  We discussed her constipation concerns and how this can affect her lower urinary tract complaints.  We discussed taking a daily fiber supplement and titrating MiraLAX to a soft bowel movement every day to 2 days.    5. The patient will follow-up at her earliest convenience for 100 units Botox.     LAUREN Montoya MD     Scribe Attestation  By signing my name below, IRadha Scribe attest that this documentation has been prepared under the direction and in the presence of Ignacio Montoya MD. All medical record entries made by the Scribe were at my direction or personally dictated by me. I have reviewed the chart and agree that the record accurately reflects my personal performance of the history, physical exam, discussion and plan.

## 2024-12-03 ENCOUNTER — OFFICE VISIT (OUTPATIENT)
Dept: UROLOGY | Facility: CLINIC | Age: 79
End: 2024-12-03
Payer: MEDICARE

## 2024-12-03 VITALS
HEART RATE: 58 BPM | BODY MASS INDEX: 20.8 KG/M2 | SYSTOLIC BLOOD PRESSURE: 131 MMHG | WEIGHT: 99.5 LBS | DIASTOLIC BLOOD PRESSURE: 64 MMHG

## 2024-12-03 DIAGNOSIS — N81.4 CYSTOCELE WITH UTERINE DESCENSUS: ICD-10-CM

## 2024-12-03 DIAGNOSIS — N81.89 PELVIC FLOOR WEAKNESS: ICD-10-CM

## 2024-12-03 DIAGNOSIS — N39.41 URGE INCONTINENCE: Primary | ICD-10-CM

## 2024-12-03 DIAGNOSIS — N81.10 FEMALE BLADDER PROLAPSE: ICD-10-CM

## 2024-12-03 DIAGNOSIS — N39.3 FEMALE STRESS INCONTINENCE: ICD-10-CM

## 2024-12-03 LAB
POC APPEARANCE, URINE: CLEAR
POC BILIRUBIN, URINE: NEGATIVE
POC BLOOD, URINE: NEGATIVE
POC COLOR, URINE: YELLOW
POC GLUCOSE, URINE: NEGATIVE MG/DL
POC KETONES, URINE: NEGATIVE MG/DL
POC LEUKOCYTES, URINE: NEGATIVE
POC NITRITE,URINE: NEGATIVE
POC PH, URINE: 8.5 PH
POC PROTEIN, URINE: NEGATIVE MG/DL
POC SPECIFIC GRAVITY, URINE: 1.02
POC UROBILINOGEN, URINE: 0.2 EU/DL

## 2024-12-03 PROCEDURE — 1036F TOBACCO NON-USER: CPT | Performed by: OBSTETRICS & GYNECOLOGY

## 2024-12-03 PROCEDURE — 99214 OFFICE O/P EST MOD 30 MIN: CPT | Performed by: OBSTETRICS & GYNECOLOGY

## 2024-12-03 PROCEDURE — 81003 URINALYSIS AUTO W/O SCOPE: CPT | Performed by: OBSTETRICS & GYNECOLOGY

## 2024-12-03 PROCEDURE — 1159F MED LIST DOCD IN RCRD: CPT | Performed by: OBSTETRICS & GYNECOLOGY

## 2024-12-03 PROCEDURE — G2211 COMPLEX E/M VISIT ADD ON: HCPCS | Performed by: OBSTETRICS & GYNECOLOGY

## 2024-12-03 PROCEDURE — 1160F RVW MEDS BY RX/DR IN RCRD: CPT | Performed by: OBSTETRICS & GYNECOLOGY

## 2024-12-03 NOTE — PATIENT INSTRUCTIONS
Please use MiraLAX daily to help with your constipation. Please titrate this to a soft bowel movement every day. Please use a fiber supplement that can include Metamucil, Citrucel, FiberCon, fiber biscuits, or fiber Gummies as well.   We discussed Botox with the patient and she agreed to proceed. Please start Botox. We will work on prior authorization.  We also discussed trial of Gemtesa 75 mg or Myrbetriq 25 mg.     Call the clinic with questions or concerns.    [phone]

## 2024-12-06 ENCOUNTER — DOCUMENTATION (OUTPATIENT)
Dept: PHYSICAL THERAPY | Facility: CLINIC | Age: 79
End: 2024-12-06
Payer: MEDICARE

## 2024-12-06 NOTE — PROGRESS NOTES
Physical Therapy    Discharge Summary    Name: Manjula Ríos  MRN: [id number]  : 1945  Date: 24    Discharge Summary: PT    Discharge Information: Date of discharge 24, Date of last visit 24, Date of evaluation 24, Number of attended visits 1, Referred by Dr. Montoya, and Referred for pelvic floor dysfunction    Discharge Status: unknown    Rehab Discharge Reason: Other pt wanted to hold off on PT due to  having surgery

## 2024-12-12 ENCOUNTER — TELEPHONE (OUTPATIENT)
Dept: UROLOGY | Facility: CLINIC | Age: 79
End: 2024-12-12

## 2024-12-12 DIAGNOSIS — N95.2 VAGINAL ATROPHY: Primary | ICD-10-CM

## 2024-12-12 RX ORDER — ESTRADIOL 0.1 MG/G
CREAM VAGINAL
Qty: 42.5 G | Refills: 3 | Status: SHIPPED | OUTPATIENT
Start: 2024-12-13

## 2025-02-03 DIAGNOSIS — M81.8 OTHER OSTEOPOROSIS WITHOUT CURRENT PATHOLOGICAL FRACTURE: Primary | ICD-10-CM

## 2025-02-05 ENCOUNTER — TELEPHONE (OUTPATIENT)
Dept: PRIMARY CARE | Facility: CLINIC | Age: 80
End: 2025-02-05

## 2025-02-05 ENCOUNTER — TELEPHONE (OUTPATIENT)
Dept: ENDOCRINOLOGY | Facility: CLINIC | Age: 80
End: 2025-02-05
Payer: MEDICARE

## 2025-02-05 DIAGNOSIS — Z78.0 MENOPAUSE: Primary | ICD-10-CM

## 2025-02-05 NOTE — TELEPHONE ENCOUNTER
Patient called for orders to have bone density testing     Last dexa scan 02/27/2023    No up coming ov   Last ov 06/07/24

## 2025-02-10 ENCOUNTER — LAB (OUTPATIENT)
Dept: LAB | Facility: HOSPITAL | Age: 80
End: 2025-02-10
Payer: MEDICARE

## 2025-02-10 DIAGNOSIS — M81.8 OTHER OSTEOPOROSIS WITHOUT CURRENT PATHOLOGICAL FRACTURE: ICD-10-CM

## 2025-02-10 LAB
ALBUMIN SERPL BCP-MCNC: 4 G/DL (ref 3.4–5)
ALP SERPL-CCNC: 36 U/L (ref 33–136)
ALT SERPL W P-5'-P-CCNC: 20 U/L (ref 7–45)
ANION GAP SERPL CALC-SCNC: 9 MMOL/L (ref 10–20)
AST SERPL W P-5'-P-CCNC: 24 U/L (ref 9–39)
BILIRUB SERPL-MCNC: 0.7 MG/DL (ref 0–1.2)
BUN SERPL-MCNC: 24 MG/DL (ref 6–23)
CALCIUM SERPL-MCNC: 10.3 MG/DL (ref 8.6–10.3)
CHLORIDE SERPL-SCNC: 102 MMOL/L (ref 98–107)
CO2 SERPL-SCNC: 30 MMOL/L (ref 21–32)
CREAT SERPL-MCNC: 0.74 MG/DL (ref 0.5–1.05)
EGFRCR SERPLBLD CKD-EPI 2021: 83 ML/MIN/1.73M*2
GLUCOSE SERPL-MCNC: 94 MG/DL (ref 74–99)
POTASSIUM SERPL-SCNC: 4.3 MMOL/L (ref 3.5–5.3)
PROT SERPL-MCNC: 6.5 G/DL (ref 6.4–8.2)
SODIUM SERPL-SCNC: 137 MMOL/L (ref 136–145)

## 2025-02-10 PROCEDURE — 80053 COMPREHEN METABOLIC PANEL: CPT

## 2025-02-15 ENCOUNTER — OFFICE VISIT (OUTPATIENT)
Dept: URGENT CARE | Age: 80
End: 2025-02-15
Payer: MEDICARE

## 2025-02-15 VITALS
HEIGHT: 58 IN | RESPIRATION RATE: 18 BRPM | HEART RATE: 68 BPM | BODY MASS INDEX: 20.99 KG/M2 | OXYGEN SATURATION: 97 % | DIASTOLIC BLOOD PRESSURE: 67 MMHG | TEMPERATURE: 98.1 F | WEIGHT: 100 LBS | SYSTOLIC BLOOD PRESSURE: 101 MMHG

## 2025-02-15 DIAGNOSIS — J32.9 RHINOSINUSITIS: Primary | ICD-10-CM

## 2025-02-15 RX ORDER — AZITHROMYCIN 200 MG/5ML
POWDER, FOR SUSPENSION ORAL
Qty: 38 ML | Refills: 0 | Status: SHIPPED | OUTPATIENT
Start: 2025-02-15 | End: 2025-02-20

## 2025-02-15 ASSESSMENT — ENCOUNTER SYMPTOMS
SHORTNESS OF BREATH: 0
SINUS PRESSURE: 1
DIARRHEA: 0
RHINORRHEA: 1
SORE THROAT: 1
CHEST TIGHTNESS: 0
PALPITATIONS: 0
HEADACHES: 1
WHEEZING: 0
SINUS PAIN: 1
COUGH: 1
FEVER: 0
TROUBLE SWALLOWING: 0
VOICE CHANGE: 1
CHILLS: 0
NAUSEA: 0
CONSTIPATION: 0

## 2025-02-15 ASSESSMENT — PAIN SCALES - GENERAL: PAINLEVEL_OUTOF10: 0-NO PAIN

## 2025-02-15 NOTE — PROGRESS NOTES
Subjective   Patient ID: Manjula Ríos is a 79 y.o. female. They present today with a chief complaint of Headache (With facial pressure x1 day), Eye Problem, Ear Blocked (Right ear worse than left), and Laryngitis.    History of Present Illness  Patient presents with a few days of uri symptoms that have worsened to blocked ears, headache, loss of voice, post nasal drainage and sinus pressure. Patient explains that she has had sinus infections in the past and has taken Childrens azithromycin in the past. Denies n/v/d. Denies chest pain, sob. Denies fever, chills, sweats.       Headache  Associated symptoms: congestion, cough, drainage, sinus pressure and sore throat    Associated symptoms: no diarrhea, no ear pain, no fever and no nausea    Eye Problem  Associated symptoms: headaches    Associated symptoms: no nausea        Past Medical History  Allergies as of 02/15/2025 - Reviewed 02/15/2025   Allergen Reaction Noted    Cat dander Shortness of breath 08/31/2018    House dust Shortness of breath 08/31/2018    Oxybutynin Shortness of breath and Dry mouth 03/11/2024    Niacin Syncope and Bleeding 02/07/2024    Hay fever and allergy relief Other 06/24/2022    Naproxen GI bleeding and Other 01/31/2020    Rosuvastatin Other 05/24/2023       (Not in a hospital admission)       Past Medical History:   Diagnosis Date    Adverse effect of anesthesia     very sensitive to all medication- often uses childrens dosing    Arthritis     Bursitis     of both hips, patient denies    Cystocele with uterine descensus     Female bladder prolapse     GERD (gastroesophageal reflux disease)     asymptomatic    Hyperlipidemia 5/14/2024    Kyphoscoliosis 5/14/2024    Lung nodules 5/14/2024    Osteoporosis     Pelvic floor weakness in female     Pelvic pain     Plantar fasciitis     resolved per patient    Problem with vaginal pessary ([facility])     failed    Prolapse of female pelvic organs     Rhinitis, allergic     Scoliosis     Urge  "incontinence     Uterovaginal prolapse     Vaginal atrophy     mild       Past Surgical History:   Procedure Laterality Date    CT CARDIAC SCORING WO IV CONTRAST  12/27/2022    Score= 69.4    DILATION AND CURETTAGE OF UTERUS      HYSTERECTOMY  02/15/2024    TRIGGER FINGER RELEASE Right     thumb        reports that she quit smoking about 38 years ago. Her smoking use included cigarettes. She started smoking about 53 years ago. She has a 11.3 pack-year smoking history. She has never used smokeless tobacco. She reports current alcohol use. She reports that she does not use drugs.    Review of Systems  Review of Systems   Constitutional:  Negative for chills and fever.   HENT:  Positive for congestion, postnasal drip, rhinorrhea, sinus pressure, sinus pain, sore throat and voice change. Negative for ear pain and trouble swallowing.    Respiratory:  Positive for cough. Negative for chest tightness, shortness of breath and wheezing.    Cardiovascular:  Negative for chest pain and palpitations.   Gastrointestinal:  Negative for constipation, diarrhea and nausea.   Skin:  Negative for rash.   Neurological:  Positive for headaches.                                  Objective    Vitals:    02/15/25 1053   BP: 101/67   Pulse: 68   Resp: 18   Temp: 36.7 °C (98.1 °F)   TempSrc: Oral   SpO2: 97%   Weight: 45.4 kg (100 lb)   Height: 1.473 m (4' 10\")     No LMP recorded (lmp unknown). Patient is postmenopausal.    Physical Exam  Constitutional:       General: She is not in acute distress.     Appearance: She is not toxic-appearing.   HENT:      Right Ear: External ear normal. A middle ear effusion is present.      Left Ear: External ear normal. A middle ear effusion is present.      Nose: Congestion present.      Right Sinus: Maxillary sinus tenderness present. No frontal sinus tenderness.      Left Sinus: Maxillary sinus tenderness present. No frontal sinus tenderness.      Mouth/Throat:      Mouth: Mucous membranes are moist. No " oral lesions.      Pharynx: Postnasal drip present. No oropharyngeal exudate or posterior oropharyngeal erythema.   Eyes:      Pupils: Pupils are equal, round, and reactive to light.   Cardiovascular:      Rate and Rhythm: Normal rate and regular rhythm.   Pulmonary:      Effort: Pulmonary effort is normal. No respiratory distress.      Breath sounds: Normal breath sounds. No wheezing.   Musculoskeletal:      Cervical back: Normal range of motion.   Neurological:      Mental Status: She is alert.         Procedures    Point of Care Test & Imaging Results from this visit  No results found for this visit on 02/15/25.   No results found.    Diagnostic study results (if any) were reviewed by Enedina Henriquez PA-C.    Assessment/Plan   Allergies, medications, history, and pertinent labs/EKGs/Imaging reviewed by Enedina Henriquez PA-C.     Medical Decision Making  MDM- History and examination consistent with acute uncomplicated rhinosinusitis. No indication for labs or imaging at this time. No evidence of sepsis, strep pharyngitis, or pneumonia. Counseled patient/family on antibiotic treatment and supportive measures at home. Patient advised to return to clinic or present to ED if symptoms change or worsen. Otherwise follow-up with PCP. Patient verbalized understanding and agrees with plan.       Orders and Diagnoses  Diagnoses and all orders for this visit:  Rhinosinusitis  -     azithromycin (Zithromax) 200 mg/5 mL suspension; Take 12.5 mL (500 mg) by mouth once daily for 1 day, THEN 6.3 mL (250 mg) once daily for 4 days.      Medical Admin Record      Patient disposition: Home    Electronically signed by Enedina Henriquez PA-C  11:02 AM

## 2025-02-15 NOTE — PATIENT INSTRUCTIONS
Recommended Salt water gargles, hot tea and honey, tylenol/ibuprofen, nasal sprays, steam inhalation    Er if you develop chest pain, sob, or fever not reduced with tylenol/ibuprofen    Recommended probiotics, yogurt    Follow up with pcp.

## 2025-02-28 ENCOUNTER — APPOINTMENT (OUTPATIENT)
Dept: INFUSION THERAPY | Facility: CLINIC | Age: 80
End: 2025-02-28
Payer: MEDICARE

## 2025-02-28 VITALS
HEART RATE: 55 BPM | BODY MASS INDEX: 20.99 KG/M2 | RESPIRATION RATE: 18 BRPM | OXYGEN SATURATION: 99 % | DIASTOLIC BLOOD PRESSURE: 69 MMHG | WEIGHT: 100.42 LBS | TEMPERATURE: 97.6 F | SYSTOLIC BLOOD PRESSURE: 114 MMHG

## 2025-02-28 DIAGNOSIS — M81.8 OTHER OSTEOPOROSIS WITHOUT CURRENT PATHOLOGICAL FRACTURE: ICD-10-CM

## 2025-02-28 RX ORDER — EPINEPHRINE 0.3 MG/.3ML
0.3 INJECTION SUBCUTANEOUS EVERY 5 MIN PRN
OUTPATIENT
Start: 2025-08-19

## 2025-02-28 RX ORDER — FAMOTIDINE 10 MG/ML
20 INJECTION, SOLUTION INTRAVENOUS ONCE AS NEEDED
OUTPATIENT
Start: 2025-08-19

## 2025-02-28 RX ORDER — DIPHENHYDRAMINE HYDROCHLORIDE 50 MG/ML
50 INJECTION INTRAMUSCULAR; INTRAVENOUS AS NEEDED
OUTPATIENT
Start: 2025-08-19

## 2025-02-28 RX ORDER — ALBUTEROL SULFATE 0.83 MG/ML
3 SOLUTION RESPIRATORY (INHALATION) AS NEEDED
OUTPATIENT
Start: 2025-08-19

## 2025-02-28 ASSESSMENT — ENCOUNTER SYMPTOMS
UNEXPECTED WEIGHT CHANGE: 0
CHILLS: 0
VOMITING: 0
APPETITE CHANGE: 0
SHORTNESS OF BREATH: 0
LEG SWELLING: 0
HEADACHES: 0
EYE PROBLEMS: 0
DIARRHEA: 0
ABDOMINAL PAIN: 0
ARTHRALGIAS: 0
TROUBLE SWALLOWING: 0
WHEEZING: 0
HEMATURIA: 0
NAUSEA: 0
CONSTIPATION: 0
WOUND: 0
LIGHT-HEADEDNESS: 0
PALPITATIONS: 0
FREQUENCY: 0
DIZZINESS: 0
BLOOD IN STOOL: 0
NUMBNESS: 0
SORE THROAT: 0
FEVER: 0
COUGH: 0
EXTREMITY WEAKNESS: 0
FATIGUE: 0
MYALGIAS: 0
DYSURIA: 0
VOICE CHANGE: 0

## 2025-02-28 NOTE — PATIENT INSTRUCTIONS
Today :We administered denosumab.     For:   1. Other osteoporosis without current pathological fracture         Your next appointment is due in:  6 MONTHS.        Please read the  Medication Guide that was given to you and reviewed during todays visit.     (Tell all doctors including dentists that you are taking this medication)     Go to the emergency room or call 911 if:  -You have signs of allergic reaction:   -Rash, hives, itching.   -Swollen, blistered, peeling skin.   -Swelling of face, lips, mouth, tongue or throat.   -Tightness of chest, trouble breathing, swallowing or talking     Call your doctor:  - If IV / injection site gets red, warm, swollen, itchy or leaks fluid or pus.     (Leave dressing on your IV site for at least 2 hours and keep area clean and dry  - If you get sick or have symptoms of infection or are not feeling well for any reason.    (Wash your hands often, stay away from people who are sick)  - If you have side effects from your medication that do not go away or are bothersome.     (Refer to the teaching your nurse gave you for side effects to call your doctor about)    - Common side effects may include:  stuffy nose, headache, feeling tired, muscle aches, upset stomach  - Before receiving any vaccines     - Call the [facility] at   If:  - You get sick, are on antibiotics, have had a recent vaccine, have surgery or dental work and your doctor wants your visit rescheduled.  - You need to cancel and reschedule your visit for any reason. Call at least 2 days before your visit if you need to cancel.   - Your insurance changes before your next visit.    (We will need to get approval from your new insurance. This can take up to two weeks.)     The [facility] is opened Monday thru Friday. We are closed on weekends and holidays.   Voice mail will take your call if the center is closed. If you leave a message please allow 24 hours for a call back during weekdays.  If you leave a message on a weekend/holiday, we will call you back the next business day.    A pharmacist is available Monday - Friday from 8:30AM to 3:30PM to help answer any questions you may have about your prescriptions(s). Please call pharmacy at:    [facility]: [phone]  [facility]: [phone]  [facility]: [phone]

## 2025-02-28 NOTE — PROGRESS NOTES
[facility]   Infusion Clinic Note   Date: 2025   Name: Manjula Ríos  : 1945   MRN: [id number]         Reason for Visit: Follow-up and Injections (PATIENT IS HERE FOR PROLIA 60 MG SUBCUTANEOUS INJECTION EVERY 6 MONTHS.)         Today: We administered denosumab.       Ordered By: Leticia Barney MD       For a Diagnosis of: Other osteoporosis without current pathological fracture       At today's visit patient accompanied by:       Vitals:   Vitals:    25 1305   BP: 114/69   Pulse: 55   Resp: 18   Temp: 36.4 °C (97.6 °F)   SpO2: 99%   Weight: 45.5 kg (100 lb 6.7 oz)             Pre - Treatment Checklist:      - Previous reaction to current treatment: no      Assess patient for the concerns below. Document provider notification as appropriate.  - Active or recent infection with/without current antibiotic use: no  - Recent or planned invasive dental work: no  - Recent or planned surgeries: no  - Recently received or plans to receive vaccinations: no  - Has treatment related toxicities: no  - Any chance may be pregnant:  n/a      Pain: 0   - Is the pain different from normal: n/a   - Is prescribing Doctor aware:  n/a      Labs: Reviewed       Fall Risk Screening: Ro Fall Risk  History of Falling, Immediate or Within 3 Months: No  Secondary Diagnosis: No  Ambulatory Aid: Walks without aid/bedrest/nurse assist  Intravenous Therapy/Heparin Lock: No  Gait/Transferring: Normal/bedrest/immobile  Mental Status: Oriented to own ability  Ro Fall Risk Score: 0       Review Of Systems:  Review of Systems   Constitutional:  Negative for appetite change, chills, fatigue, fever and unexpected weight change.   HENT:   Negative for hearing loss, mouth sores, sore throat, tinnitus, trouble swallowing and voice change.    Eyes:  Negative for eye problems.   Respiratory:  Negative for cough, shortness of breath and wheezing.    Cardiovascular:  Negative for chest pain, leg  "swelling and palpitations.   Gastrointestinal:  Negative for abdominal pain, blood in stool, constipation, diarrhea, nausea and vomiting.   Genitourinary:  Negative for dysuria, frequency and hematuria.    Musculoskeletal:  Negative for arthralgias and myalgias.        STIFFNESS IN THE MORNING.   Skin:  Negative for itching, rash and wound.   Neurological:  Negative for dizziness, extremity weakness, headaches, light-headedness and numbness.         Infusion Readiness:  - Assessment Concerns Related to Infusion: No  - Provider notified: n/a      New Patient Education:    N/A (returning patient for continuation of therapy. Ongoing education provided as needed.)        Treatment Conditions & Drug Specific Questions:    Denosumab  (PROLIA. XGEVA)    (Unless otherwise specified on patient specific therapy plan):     TREATMENT CONDITIONS:  Unless otherwise specified on patient specific therapy plan HOLD and notify provider prior to proceeding with today's injection if patients:  o Calcium is LESS THAN 8.6 mg/dL OR  Ionized Calcium LESS THAN 1.1 mmol/L  o Recent or planned invasive dental procedure (within 4 weeks)    Lab Results   Component Value Date    CALCIUM 10.3 02/10/2025    PHOS 3.8 12/06/2018      No results found for: \"CAION\"    Patient meets treatment conditions? Yes    DRUG SPECIFIC QUESTIONS:  Is the patient taking calcium and vitamin D? Yes  (Recommended)    Pt Instructed on following risks: (1) hypocalcemia, (2) osteonecrosis of the jaw, (3) atypical femoral fractures, (4) serious infections, and (5) dermatologic reactions?  Yes      REMINDER:  PREGNANCY CATEGORY X DRUG. OBTAIN NEGTATIVE PREGNANCY TEST PRIOR TO FIRST INFUSION FOR WOMEN OF CHILDBEARING ABILITY   REMS DRUG    Recommended Vitals/Observation:  Vitals: Obtain vitals prior to injection.  Observation: Patient may leave immediately following injection.        Weight Based Drug Calculations:    WEIGHT BASED DRUGS: NOT APPLICABLE / FLAT DOSE     "   Post Treatment: Patient tolerated treatment without issue and was discharged in no apparent distress.      Note Authored / Patient Cared for By: Darcie Davila RN

## 2025-03-28 ENCOUNTER — OFFICE VISIT (OUTPATIENT)
Dept: OBSTETRICS AND GYNECOLOGY | Facility: CLINIC | Age: 80
End: 2025-03-28
Payer: MEDICARE

## 2025-03-28 VITALS
HEIGHT: 58 IN | BODY MASS INDEX: 20.99 KG/M2 | DIASTOLIC BLOOD PRESSURE: 65 MMHG | WEIGHT: 100 LBS | SYSTOLIC BLOOD PRESSURE: 127 MMHG

## 2025-03-28 DIAGNOSIS — N39.41 URGE INCONTINENCE: Primary | ICD-10-CM

## 2025-03-28 DIAGNOSIS — N81.89 PELVIC FLOOR WEAKNESS: ICD-10-CM

## 2025-03-28 DIAGNOSIS — N95.2 ATROPHIC VAGINITIS: ICD-10-CM

## 2025-03-28 LAB
POC BLOOD, URINE: NEGATIVE
POC GLUCOSE, URINE: NEGATIVE MG/DL
POC KETONES, URINE: NEGATIVE MG/DL
POC LEUKOCYTES, URINE: ABNORMAL
POC NITRITE,URINE: NEGATIVE
POC PROTEIN, URINE: NEGATIVE MG/DL

## 2025-03-28 PROCEDURE — 99213 OFFICE O/P EST LOW 20 MIN: CPT | Performed by: OBSTETRICS & GYNECOLOGY

## 2025-03-28 PROCEDURE — 1159F MED LIST DOCD IN RCRD: CPT | Performed by: OBSTETRICS & GYNECOLOGY

## 2025-03-28 PROCEDURE — 81003 URINALYSIS AUTO W/O SCOPE: CPT | Mod: QW | Performed by: OBSTETRICS & GYNECOLOGY

## 2025-03-28 PROCEDURE — 1126F AMNT PAIN NOTED NONE PRSNT: CPT | Performed by: OBSTETRICS & GYNECOLOGY

## 2025-03-28 ASSESSMENT — PAIN SCALES - GENERAL: PAINLEVEL_OUTOF10: 0-NO PAIN

## 2025-03-28 ASSESSMENT — ENCOUNTER SYMPTOMS
ALLERGIC/IMMUNOLOGIC NEGATIVE: 0
GASTROINTESTINAL NEGATIVE: 0
RESPIRATORY NEGATIVE: 0
PSYCHIATRIC NEGATIVE: 0
ENDOCRINE NEGATIVE: 0
DEPRESSION: 0
CONSTITUTIONAL NEGATIVE: 0
NEUROLOGICAL NEGATIVE: 0
CARDIOVASCULAR NEGATIVE: 0
OCCASIONAL FEELINGS OF UNSTEADINESS: 0
LOSS OF SENSATION IN FEET: 0
SINUS PRESSURE: 1
MUSCULOSKELETAL NEGATIVE: 0
EYES NEGATIVE: 0
CONSTIPATION: 1
FREQUENCY: 1
HEMATOLOGIC/LYMPHATIC NEGATIVE: 0

## 2025-03-28 NOTE — PROGRESS NOTES
Subjective   Patient ID: Manjula Ríos is a 79 y.o. female who presents for Annual Exam (Patient has no pain or falls, no complaints or concerns, last pap 10/31/14 wnl last mammogram 12/2/24 benign no chaperone needed).  MADHU Fair is a 79-year-old female well-known to the practice here for evaluation of continued urinary symptoms.  Over the past year she has had a vaginal hysterectomy with a sacrospinous suspension.  Also had a ball, and injected into the urethra for consequential leakage.  Now with a sense of urgency both upon awakening or returning from public outing.  Does wear a pad almost continuously.  Admits that she went to physical therapy once but was unable to perform some of the exercises especially the Kegels as it caused some rectal discomfort.  Review of Systems   HENT:  Positive for sinus pressure.    Gastrointestinal:  Positive for constipation.   Genitourinary:  Positive for frequency.   All other systems reviewed and are negative.      Objective   Physical Exam  Abdomen is soft nondistended bowel sounds positive no masses palpated nontender    Pelvic exam: External genitalia Bartholin's urethra and [place]'s normal.  Some bulging of the anterior vaginal wall.  Good moisture and integrity of the vaginal wall.  Sacrospinous suspension intact.  Assessment/Plan   Vaginal atrophy improved with vaginal estrogen 3 times weekly    Vaginal prolapse improved after sacrospinous suspension but now with some anterior weakness and urgency type incontinence by patient history    Will be seen by urology for possible Botox injections or evaluation of the anterior vaginal wall.    Refer back to physical therapy and engage further with pelvic floor therapy         Earl Sepulveda MD 03/28/25 12:14 PM

## 2025-05-21 NOTE — PROGRESS NOTES
"PROCEDURE NOTE:    PREOPERATIVE DIAGNOSIS:  Urge incontinence    POSTOPERATIVE DIAGNOSIS:  Same    OPERATION:  Flexible Cystourethroscopy  Bladder Botox 100 units    SURGEON:  Patrica Manning    ANESTHESIA:  2%  lidocaine jelly    COMPLICATIONS:  None    EBL: Minimal    SPECIMEN:none    DISPOSITION:  The patient was discharged home after the procedure, per routine.    INDICATIONS: :  Ms. Ríos is a 79 y.o. patient with a history of urge incontinence who presents today for Cystoscopy.     The indications, risks and benefits of this procedure were discussed with the patient, consent was obtained prior to the procedure, and to the best of my judgement the patient seemed to understand and agree to the procedure.    PROCEDURE:  The patient  was brought into the procedure suite and informed consent was reviewed and confirmed. Vital signs were obtained prior to the procedure: /68   Pulse 60   Ht 1.473 m (4' 10\")   Wt 45.4 kg (100 lb)   LMP  (LMP Unknown)   BMI 20.90 kg/m² .   The patient was escorted onto the stretcher, placed supine and froglegged, prepped with betadine and draped in the usual standard surgical fashion.  Intraurethral 2% viscous lidocaine jelly was used for local analgesia.  A 16 Azeri flexible cystourethroscope was inserted into the urethra.     Upon entering the bladder the entire bladder was surveyed in a 360 degree fashion.  The left and right ureteral orifices were in normal orthotopic position effluxing clear yellow urine, bilaterally.   There was no evidence of any bladder lesions, foreign objects, stones or evidence of any mucosal changes. The cystoscope was then retroflexed.  The bladder neck was then further examined without any evidence of lesions. The scope was then removed and in an antegrade fashion, the urethra and bladder were again resurveyed with no evidence of additional lesions.  The cystoscope was then fully removed.   The patient tolerated the procedure well.  Vitals were " stable after the procedure.  The patient was able to void and was discharged home.  Verbal and written Post procedure instructions were reviewed with the patient.    Bladder Botox:  100 units of Botox was reconstituted in 10 mL of preservative-free saline. This was injected into detrusor muscle in .5 mL increments avoiding trigone area. Patient tolerated procedure well and voided without difficulty.      Urine dipstick shows negative for all components.  Micro exam: not done.     PLAN:  Prescription for  Keflex oral suspension sent to the patient's pharmacy.   We discussed whether she would like to repeat bulkamid or see one of my colleagues for a TVT, she will see how botox goes first   She would also like to observe POP, does not want a pessary again not sure about surgery  Follow-up in 1 month.      Scribe Attestation  By signing my name below, I, Chiara Greco, Deejay   attest that this documentation has been prepared under the direction and in the presence of Patrica Manning MD MPH.

## 2025-05-22 ENCOUNTER — APPOINTMENT (OUTPATIENT)
Dept: UROLOGY | Facility: CLINIC | Age: 80
End: 2025-05-22
Payer: MEDICARE

## 2025-05-22 VITALS
BODY MASS INDEX: 20.99 KG/M2 | DIASTOLIC BLOOD PRESSURE: 68 MMHG | WEIGHT: 100 LBS | HEART RATE: 60 BPM | SYSTOLIC BLOOD PRESSURE: 115 MMHG | HEIGHT: 58 IN

## 2025-05-22 DIAGNOSIS — N39.41 URGE INCONTINENCE: Primary | ICD-10-CM

## 2025-05-22 DIAGNOSIS — N95.8 GENITOURINARY SYNDROME OF MENOPAUSE: ICD-10-CM

## 2025-05-22 DIAGNOSIS — N81.10 PELVIC ORGAN PROLAPSE QUANTIFICATION STAGE 3 CYSTOCELE: ICD-10-CM

## 2025-05-22 LAB
POC APPEARANCE, URINE: CLEAR
POC BILIRUBIN, URINE: NEGATIVE
POC BLOOD, URINE: NEGATIVE
POC COLOR, URINE: YELLOW
POC GLUCOSE, URINE: NEGATIVE MG/DL
POC KETONES, URINE: NEGATIVE MG/DL
POC LEUKOCYTES, URINE: NEGATIVE
POC NITRITE,URINE: NEGATIVE
POC PH, URINE: 7 PH
POC PROTEIN, URINE: NEGATIVE MG/DL
POC SPECIFIC GRAVITY, URINE: 1.02
POC UROBILINOGEN, URINE: 0.2 EU/DL

## 2025-05-22 PROCEDURE — 81003 URINALYSIS AUTO W/O SCOPE: CPT | Performed by: OBSTETRICS & GYNECOLOGY

## 2025-05-22 PROCEDURE — 52287 CYSTOSCOPY CHEMODENERVATION: CPT | Performed by: OBSTETRICS & GYNECOLOGY

## 2025-05-22 RX ORDER — SODIUM BICARBONATE 84 MG/ML
10 INJECTION, SOLUTION INTRAVENOUS ONCE
Status: COMPLETED | OUTPATIENT
Start: 2025-05-22 | End: 2025-05-22

## 2025-05-22 RX ORDER — LIDOCAINE HYDROCHLORIDE 20 MG/ML
1 JELLY TOPICAL ONCE
Status: COMPLETED | OUTPATIENT
Start: 2025-05-22 | End: 2025-05-22

## 2025-05-22 RX ORDER — LIDOCAINE HYDROCHLORIDE 10 MG/ML
50 INJECTION, SOLUTION INFILTRATION; PERINEURAL ONCE
Status: COMPLETED | OUTPATIENT
Start: 2025-05-22 | End: 2025-05-22

## 2025-05-22 RX ADMIN — SODIUM BICARBONATE 10 MEQ: 84 INJECTION, SOLUTION INTRAVENOUS at 15:40

## 2025-05-22 RX ADMIN — LIDOCAINE HYDROCHLORIDE 1 APPLICATION: 20 JELLY TOPICAL at 15:42

## 2025-05-22 RX ADMIN — LIDOCAINE HYDROCHLORIDE 50 ML: 10 INJECTION, SOLUTION INFILTRATION; PERINEURAL at 15:26

## 2025-05-23 DIAGNOSIS — R39.9 URINARY SYMPTOM OR SIGN: ICD-10-CM

## 2025-05-23 RX ORDER — CEPHALEXIN 250 MG/5ML
250 POWDER, FOR SUSPENSION ORAL 2 TIMES DAILY
Qty: 50 ML | Refills: 0 | Status: SHIPPED | OUTPATIENT
Start: 2025-05-23 | End: 2025-05-28

## 2025-06-11 NOTE — PROGRESS NOTES
FOLLOW-UP VISIT       HISTORY OF PRESENT ILLNESS:   Manjula Ríos is a 79 y.o. female presenting to me today for follow-up of urge incontinence, pelvic organ prolapse, and GSM. Patient was last evaluated on 5/22/25 for cystoscopy and bladder Botox. At that visit we discussed whether she would like to repeat Bulkamid or see one of my colleagues for a TVT. We also discussed options for pelvic organ prolapse was was found during cystoscopy. Patient elected to see how bladder Botox would go, observe her prolapse, and follow-up in 1 month.     Today the patient reports to be doing well since having the procedure. She continues to have intermittent leakage with cough and sneeze but states this is much better than previously. She is overall happy with the results of the bladder Botox.     Regarding pelvic organ prolapse, patient has previously utilized a pessary which she did not like utilizing.     PAST MEDICAL HISTORY:  Medical History[1]    PAST SURGICAL HISTORY:  Surgical History[2]     ALLERGIES:   Allergies[3]     MEDICATIONS:   Medication Documentation Review Audit       Reviewed by Krista Brink MA (Medical Assistant) on 06/13/25 at 1318      Medication Order Taking? Sig Documenting Provider Last Dose Status   ascorbic acid (Vitamin C) 500 mg tablet [id number] Yes Take 1 tablet (500 mg) by mouth once daily. Historical Provider, MD Taking Active   ascorbic acid/collagen hydr (COLLAGEN SKIN RENEWAL ORAL) [id number] Yes Take 1 Dose by mouth once daily. Historical Provider, MD Taking Active   biotin 10 mg tablet [id number] Yes Take by mouth. Historical Provider, MD Taking Active   CALCIUM ORAL [id number] Yes Take 750 mg by mouth. Historical Provider, MD Taking Active   cholecalciferol (Vitamin D-3) 50 MCG (2000 UT) tablet [id number] Yes Take by mouth. Historical Provider, MD Taking Active   chromium picolinate 1,000 mcg tablet [id number] Yes Take by mouth. Historical Provider, MD Taking Active   Cinnamon 500 mg capsule  [id number] No Take by mouth.   Patient not taking: Reported on 2/28/2025    Historical MD Randal Taking Active   coenzyme Q-10 100 mg capsule [id number] Yes Take 1 capsule (100 mg) by mouth. Last taken 5/6 Historical MD Randal Taking Active   cyanocobalamin, vitamin B-12, (Vitamin B-12) 1,000 mcg tablet extended release [id number] Yes Take by mouth. Luis Alberto Tee MD Taking Active   denosumab (Prolia) 60 mg/mL syringe [id number] Yes Inject 1 mL (60 mg total) under the skin every 6 months. Leticia Barney MD  Active   DIETARY SUPPLEMENT ORAL [id number] Yes Take 1 Dose by mouth once daily. PROBIOTIC Historical MD Randal Taking Active   ELDERBERRY FRUIT ORAL [id number] Yes Take 1 Dose by mouth once daily. Historical MD Randal Taking Active   estradiol (Estrace) 0.01 % (0.1 mg/gram) vaginal cream [id number] Yes Apply dime sized amount to finger and place vaginally nightly for 2 weeks, then apply 3 nights per week([facility] or [facility]). Throw away applicator! Do not fill before December 13, 2024. Ignacio Montoya MD  Active   EVENING PRIMROSE OIL ORAL [id number] No Take 1 Dose by mouth once daily.   Patient not taking: Reported on 2/28/2025    Historical MD Randal Taking Active   fluticasone (Flonase) 50 mcg/actuation nasal spray [id number] Yes Administer 1 spray into each nostril once daily. Shake gently. Before first use, prime pump. After use, clean tip and replace cap. Historical MD Randal Taking Active   multivitamin (Daily Multi-Vitamin) tablet [id number] Yes Take by mouth. Historical MD Randal Taking Active   omega-3 (Fish OiL) 300-1,000 mg capsule [id number] Yes Take 1 capsule (1,000 mg) by mouth 2 times a day. 750 mg Luis Alberto Tee MD Taking Active   polydextrose/vitamin B complex (FIBER GUMMIES, WITH B-COMPLEX, ORAL) [id number] Yes Take 1 tablet by mouth once daily at bedtime. Luis Alberto Tee MD Taking Active   polyethylene glycol (Glycolax, Miralax) 17 gram packet [id number] Yes Take  17 g by mouth once daily. Alyssa Tavera MD Taking Active   vit A/vit C/vit E/zinc/copper (PRESERVISION AREDS ORAL) [id number] Yes Take 2 capsules by mouth once daily. Historical Provider, MD Taking Active   zinc gluconate 50 mg tablet [id number] Yes Take 1 tablet by mouth once daily. Historical Provider, MD Taking Active                     SOCIAL HISTORY:  Patient  reports that she quit smoking about 38 years ago. Her smoking use included cigarettes. She started smoking about 53 years ago. She has a 11.3 pack-year smoking history. She has never used smokeless tobacco. She reports current alcohol use. She reports that she does not use drugs.   Social History     Socioeconomic History    Marital status:      Spouse name: Not on file    Number of children: Not on file    Years of education: Not on file    Highest education level: Not on file   Occupational History    Not on file   Tobacco Use    Smoking status: Former     Current packs/day: 0.00     Average packs/day: 0.8 packs/day for 15.0 years (11.3 ttl pk-yrs)     Types: Cigarettes     Start date:      Quit date:      Years since quittin.4    Smokeless tobacco: Never   Vaping Use    Vaping status: Never Used   Substance and Sexual Activity    Alcohol use: Yes     Comment: rare social, has red wine once every few months    Drug use: Never    Sexual activity: Defer   Other Topics Concern    Not on file   Social History Narrative    Not on file     Social Drivers of Health     Financial Resource Strain: Low Risk  (3/5/2025)    Received from [facility]    Overall Financial Resource Strain (CARDIA)     Difficulty of Paying Living Expenses: Not hard at all   Food Insecurity: No Food Insecurity (3/5/2025)    Received from [facility]    Hunger Vital Sign     Worried About Running Out of Food in the Last Year: Never true     Ran Out of Food in the Last Year: Never true   Transportation Needs: No Transportation Needs (3/5/2025)    Received  from [facility]    PRAPARE - Transportation     Lack of Transportation (Medical): No     Lack of Transportation (Non-Medical): No   Physical Activity: Sufficiently Active (3/5/2025)    Received from [facility]    Exercise Vital Sign     Days of Exercise per Week: 3 days     Minutes of Exercise per Session: 80 min   Stress: No Stress Concern Present (3/5/2025)    Received from [facility]    [employer] [place] [employer] - Occupational Stress Questionnaire     Feeling of Stress : Not at all   Social Connections: Socially Integrated (3/5/2025)    Received from [facility]    Social Connection and Isolation Panel [NHANES]     Frequency of Communication with Friends and Family: More than three times a week     Frequency of Social Gatherings with Friends and Family: Twice a week     Attends L[employer] day Services: More than 4 times per year     Active Member of Clubs or Organizations: Yes     Attends Club or Organization Meetings: Never     Marital Status:    Intimate Partner Violence: Not At Risk (2/16/2024)    Humiliation, Afraid, Rape, and Kick questionnaire     Fear of Current or Ex-Partner: No     Emotionally Abused: No     Physically Abused: No     Sexually Abused: No   Housing Stability: Unknown (3/5/2025)    Received from [facility]    Housing Stability Vital Sign     Unable to Pay for Housing in the Last Year: No     Number of Times Moved in the Last Year: Not on file     Homeless in the Last Year: Not on file       FAMILY HISTORY:  Family History[4]    REVIEW OF SYSTEMS:  Constitutional: Negative for fever and chills. Denies anorexia, weight loss.  Eyes: Negative for visual disturbance.   Respiratory: Negative for shortness of breath.    Cardiovascular: Negative for chest pain.   Gastrointestinal: Negative for nausea and vomiting.   Genitourinary: See interval history above.  Skin: Negative for rash.   Neurological: Negative for dizziness and numbness.  "  Psychiatric/Behavioral: Negative for confusion and decreased concentration.     PHYSICAL EXAM:  Height 1.473 m (4' 10\"), weight 45.4 kg (100 lb).  Constitutional: Patient appears well-developed and well-nourished. No distress.    Head: Normocephalic and atraumatic.    Neck: Normal range of motion.    Cardiovascular: Normal rate.    Pulmonary/Chest: Effort normal. No respiratory distress.   Musculoskeletal: Normal range of motion.    Neurological: Alert and oriented to person, place, and time.  Psychiatric: Normal mood and affect. Behavior is normal. Thought content normal.       Assessment:      1. Urge incontinence        2. Pelvic organ prolapse quantification stage 3 cystocele        3. Genitourinary syndrome of menopause            Manjula Ríos is a 79 y.o. female with urge incontinence, pelvic organ prolapse, and GSM.     Urge incontinence:  Risks of intravesical Botox injection were discussed with the patient in great detail. Adverse reactions reported have been UTI, dysuria, hematuria, elevated PVR and urinary retention in up to 15% of patients. I explained that urinary retention is not permanent and usually resolves within 6 weeks.      Pelvic organ prolapse:   We discussed the diagnosis of pelvic organ prolapse which is descent of the vaginal walls and/or uterus from the normal anatomic position down towards or beyond the opening of the vagina. Prolapse may or may not be accompanied by bothersome symptoms of vaginal bulging, pelvic pressure, and symptoms of bladder or bowel dysfunction.      Prolapse is a common condition, although prevalence varies widely (1-65%) based on whether its presence is ascertained by symptoms, pelvic examination, or both. Multiple factors contribute to development of prolapse including: childbirth, constipation, strenuous work / heavy lifting, congenital connective tissue weakness, obesity, menopause, and chronic increased intra-abdominal pressure. It can negatively impact " quality of life, participation in physical activities, body image, and sexual function. Prolapse is a benign, but potentially progressive condition.     We discussed the following treatments 1) active monitoring / expectant management, 2) pelvic floor physical therapy, 3) pessary fitting/use, 4) surgical repair.      Plan:   Patient will be scheduled for bladder Botox and Bulkamid injections in 11/2025.        Patrica Manning MD MPH      Scribe Attestation  By signing my name below, I, Chiara Greco, Scribe   attest that this documentation has been prepared under the direction and in the presence of Patrica Manning MD MPH.            [1]   Past Medical History:  Diagnosis Date    Adverse effect of anesthesia     very sensitive to all medication- often uses childrens dosing    Arthritis     Bursitis     of both hips, patient denies    Cystocele with uterine descensus     Female bladder prolapse     GERD (gastroesophageal reflux disease)     asymptomatic    Hyperlipidemia 5/14/2024    Kyphoscoliosis 5/14/2024    Lung nodules 5/14/2024    Osteoporosis     Pelvic floor weakness in female     Pelvic pain     Plantar fasciitis     resolved per patient    Problem with vaginal pessary     failed    Prolapse of female pelvic organs     Rhinitis, allergic     Scoliosis     Urge incontinence     Uterovaginal prolapse     Vaginal atrophy     mild   [2]   Past Surgical History:  Procedure Laterality Date    CT CARDIAC SCORING WO IV CONTRAST  12/27/2022    Score= 69.4    DILATION AND CURETTAGE OF UTERUS      HYSTERECTOMY  02/15/2024    TRIGGER FINGER RELEASE Right     thumb   [3]   Allergies  Allergen Reactions    Cat Dander Shortness of breath    House Dust Shortness of breath    Oxybutynin Shortness of breath and Dry mouth    Niacin Syncope and Bleeding    Hay Fever And Allergy Relief Other    Naproxen GI bleeding and Other     bleeding    Rosuvastatin Other     Fatigue, brain fog, leg pain   [4]   Family History  Problem Relation  Name Age of Onset    Breast cancer Mother      Other (osteopetrosis) Mother      Ovarian cancer Mother      Stroke Father      Liver disease Father      Splenomegaly Father      Cardiomyopathy Father      Diabetes Sister      Hypertension Sister      Other (osteopetosis) Maternal Grandmother

## 2025-06-13 ENCOUNTER — APPOINTMENT (OUTPATIENT)
Dept: UROLOGY | Facility: CLINIC | Age: 80
End: 2025-06-13
Payer: MEDICARE

## 2025-06-13 VITALS — HEIGHT: 58 IN | BODY MASS INDEX: 20.99 KG/M2 | WEIGHT: 100 LBS

## 2025-06-13 DIAGNOSIS — N95.8 GENITOURINARY SYNDROME OF MENOPAUSE: ICD-10-CM

## 2025-06-13 DIAGNOSIS — N39.41 URGE INCONTINENCE: Primary | ICD-10-CM

## 2025-06-13 DIAGNOSIS — N81.10 PELVIC ORGAN PROLAPSE QUANTIFICATION STAGE 3 CYSTOCELE: ICD-10-CM

## 2025-06-13 PROCEDURE — G2211 COMPLEX E/M VISIT ADD ON: HCPCS | Performed by: OBSTETRICS & GYNECOLOGY

## 2025-06-13 PROCEDURE — 1159F MED LIST DOCD IN RCRD: CPT | Performed by: OBSTETRICS & GYNECOLOGY

## 2025-06-13 PROCEDURE — 99214 OFFICE O/P EST MOD 30 MIN: CPT | Performed by: OBSTETRICS & GYNECOLOGY

## 2025-06-13 PROCEDURE — 1036F TOBACCO NON-USER: CPT | Performed by: OBSTETRICS & GYNECOLOGY

## 2025-08-06 ENCOUNTER — LAB (OUTPATIENT)
Dept: LAB | Facility: HOSPITAL | Age: 80
End: 2025-08-06
Payer: MEDICARE

## 2025-08-06 DIAGNOSIS — M81.8 OTHER OSTEOPOROSIS WITHOUT CURRENT PATHOLOGICAL FRACTURE: Primary | ICD-10-CM

## 2025-08-06 DIAGNOSIS — M81.8 OTHER OSTEOPOROSIS WITHOUT CURRENT PATHOLOGICAL FRACTURE: ICD-10-CM

## 2025-08-06 LAB
ALBUMIN SERPL BCP-MCNC: 3.9 G/DL (ref 3.4–5)
ALP SERPL-CCNC: 36 U/L (ref 33–136)
ALT SERPL W P-5'-P-CCNC: 19 U/L (ref 7–45)
ANION GAP SERPL CALC-SCNC: 11 MMOL/L (ref 10–20)
AST SERPL W P-5'-P-CCNC: 23 U/L (ref 9–39)
BILIRUB SERPL-MCNC: 0.8 MG/DL (ref 0–1.2)
BUN SERPL-MCNC: 26 MG/DL (ref 6–23)
CALCIUM SERPL-MCNC: 9.4 MG/DL (ref 8.6–10.3)
CHLORIDE SERPL-SCNC: 103 MMOL/L (ref 98–107)
CO2 SERPL-SCNC: 27 MMOL/L (ref 21–32)
CREAT SERPL-MCNC: 0.66 MG/DL (ref 0.5–1.05)
EGFRCR SERPLBLD CKD-EPI 2021: 90 ML/MIN/1.73M*2
GLUCOSE SERPL-MCNC: 75 MG/DL (ref 74–99)
POTASSIUM SERPL-SCNC: 4.3 MMOL/L (ref 3.5–5.3)
PROT SERPL-MCNC: 6.6 G/DL (ref 6.4–8.2)
SODIUM SERPL-SCNC: 137 MMOL/L (ref 136–145)

## 2025-08-06 PROCEDURE — 36415 COLL VENOUS BLD VENIPUNCTURE: CPT

## 2025-08-06 PROCEDURE — 80053 COMPREHEN METABOLIC PANEL: CPT

## 2025-08-29 ENCOUNTER — APPOINTMENT (OUTPATIENT)
Dept: INFUSION THERAPY | Facility: CLINIC | Age: 80
End: 2025-08-29
Payer: MEDICARE

## 2025-08-29 VITALS
TEMPERATURE: 97.7 F | DIASTOLIC BLOOD PRESSURE: 64 MMHG | SYSTOLIC BLOOD PRESSURE: 102 MMHG | OXYGEN SATURATION: 96 % | RESPIRATION RATE: 16 BRPM | HEART RATE: 59 BPM

## 2025-08-29 DIAGNOSIS — M81.8 OTHER OSTEOPOROSIS WITHOUT CURRENT PATHOLOGICAL FRACTURE: ICD-10-CM

## 2025-08-29 PROCEDURE — 96372 THER/PROPH/DIAG INJ SC/IM: CPT | Performed by: NURSE PRACTITIONER

## 2025-08-29 RX ORDER — DIPHENHYDRAMINE HYDROCHLORIDE 50 MG/ML
50 INJECTION, SOLUTION INTRAMUSCULAR; INTRAVENOUS AS NEEDED
OUTPATIENT
Start: 2026-02-15

## 2025-08-29 RX ORDER — FAMOTIDINE 10 MG/ML
20 INJECTION, SOLUTION INTRAVENOUS ONCE AS NEEDED
OUTPATIENT
Start: 2026-02-15

## 2025-08-29 RX ORDER — EPINEPHRINE 0.3 MG/.3ML
0.3 INJECTION SUBCUTANEOUS EVERY 5 MIN PRN
OUTPATIENT
Start: 2026-02-15

## 2025-08-29 RX ORDER — ALBUTEROL SULFATE 0.83 MG/ML
3 SOLUTION RESPIRATORY (INHALATION) AS NEEDED
OUTPATIENT
Start: 2026-02-15

## 2025-08-29 ASSESSMENT — ENCOUNTER SYMPTOMS
WHEEZING: 0
COUGH: 0
LEG SWELLING: 0
SHORTNESS OF BREATH: 0
WOUND: 0
EXTREMITY WEAKNESS: 0
NUMBNESS: 0
PALPITATIONS: 0
LIGHT-HEADEDNESS: 0
DIZZINESS: 0

## 2025-09-26 ENCOUNTER — APPOINTMENT (OUTPATIENT)
Facility: CLINIC | Age: 80
End: 2025-09-26
Payer: MEDICARE

## 2026-03-02 ENCOUNTER — APPOINTMENT (OUTPATIENT)
Dept: INFUSION THERAPY | Facility: CLINIC | Age: 81
End: 2026-03-02
Payer: MEDICARE

## (undated) DEVICE — TOWEL, SURGICAL, NEURO, O/R, 16 X 26, BLUE, STERILE

## (undated) DEVICE — COLLECTION BAG, FLUID, F/STERIS LOOP, STERILE

## (undated) DEVICE — PREP TRAY, VAGINAL

## (undated) DEVICE — CAUTERY, PENCIL, PUSH BUTTON, SMOKE EVAC, 70MM

## (undated) DEVICE — SUTURE, VICRYL, 0, 18 IN, UNDYED

## (undated) DEVICE — GLOVE, SURGEON, PREMIERPRO PI, MICRO, SZ-7.5, PF, WH

## (undated) DEVICE — SPONGE, LAP, XRAY DECT, 18IN X 18IN, W/MASTER DMT, STERILE

## (undated) DEVICE — PAD, SANITARY, OBSTETRICAL, W/ADHSV STRIP,11 IN,LF

## (undated) DEVICE — DRAPE, UNDERBUTTOCKS, W / 27IN FLUID POUCH

## (undated) DEVICE — Device

## (undated) DEVICE — COVER HANDLE LIGHT, STERIS, BLUE, STERILE

## (undated) DEVICE — PAD, GROUNDING, ELECTROSURGICAL, W/15 FT CABLE, POLYHESIVE II, ADULT, LF

## (undated) DEVICE — ELECTRODE, ELECTROSURGICAL, NEEDLE, MODIFIED, BLUNT, NONSTICK, 2.75 IN

## (undated) DEVICE — TRAY, MINOR, SINGLE BASIN, STERILE

## (undated) DEVICE — GOWN, SMARTSLEEVE, XXL, X-LONG

## (undated) DEVICE — SUTURE, VICRYL, 2-0, 27 IN, SH, UNDYED

## (undated) DEVICE — COUNTER, NEEDLE, FOAM BLOCK, POP-N-COUNT, W/BLADEGUARD, W/ADHESIVE 40 COUNT, RED

## (undated) DEVICE — NEEDLE, TAPER, MAYO, 1/2 CIRCLE, DISPOSABLE, 5

## (undated) DEVICE — SUTURE, VICRYL, 0, 27 IN, CT-2, UNDYED

## (undated) DEVICE — SUTURE, VICRYL, 0, 36 IN, CT-1, UNDYED

## (undated) DEVICE — SOLUTION, IRRIGATION, USP, SODIUM CHLORIDE 0.9%, 3000 ML, BAG

## (undated) DEVICE — NEEDLE, HYPODERMIC, REGULAR WALL, REGULAR BEVEL, 20 G X 1.5 IN

## (undated) DEVICE — SOLUTION, IRRIGATION, SODIUM CHLORIDE 0.9%, 1000 ML, POUR BOTTLE

## (undated) DEVICE — DRAPE PACK, LAVH, W/ATTACHED LEGGINGS, W/POUCH, 100 X 114 IN, LF, STERILE

## (undated) DEVICE — SPONGE, LAP, XRAY DECT, 4IN X 18IN, W/MASTER DMT, STERILE

## (undated) DEVICE — STAY SET, SURGICAL, 5MM SHARP HOOK, 8PK

## (undated) DEVICE — TIP, SUCTION, YANKAUER, FLEXIBLE

## (undated) DEVICE — CATHETER, URETHRAL, FOLEY, 2 WAY, 12 FR, 5 CC, SILICONE

## (undated) DEVICE — SUTURE, PDS II, 0 36 IN, CT-1, VIOLET

## (undated) DEVICE — POSITIONING KIT, PAGAZZI, PINK PAD XL, W/ ARM AND HEAD REST

## (undated) DEVICE — BRIEF, CURITY, XLARGE, MESH

## (undated) DEVICE — GLOVE, SURGICAL, PROTEXIS PI MICRO, 7.5, PF, LF

## (undated) DEVICE — IRRIGATION SET, CYSTOSCOPY, REGULATING CLAMP, STRAIGHT, 81 IN